# Patient Record
Sex: MALE | Race: WHITE | NOT HISPANIC OR LATINO | Employment: OTHER | URBAN - METROPOLITAN AREA
[De-identification: names, ages, dates, MRNs, and addresses within clinical notes are randomized per-mention and may not be internally consistent; named-entity substitution may affect disease eponyms.]

---

## 2024-09-27 ENCOUNTER — OFFICE VISIT (OUTPATIENT)
Dept: FAMILY MEDICINE CLINIC | Facility: CLINIC | Age: 74
End: 2024-09-27
Payer: MEDICARE

## 2024-09-27 VITALS
TEMPERATURE: 96.6 F | SYSTOLIC BLOOD PRESSURE: 138 MMHG | BODY MASS INDEX: 33.46 KG/M2 | DIASTOLIC BLOOD PRESSURE: 66 MMHG | HEIGHT: 71 IN | HEART RATE: 64 BPM | OXYGEN SATURATION: 98 % | WEIGHT: 239 LBS

## 2024-09-27 DIAGNOSIS — E78.2 MIXED HYPERLIPIDEMIA: ICD-10-CM

## 2024-09-27 DIAGNOSIS — E11.9 DM TYPE 2 WITHOUT RETINOPATHY (HCC): ICD-10-CM

## 2024-09-27 DIAGNOSIS — R93.1 ELEVATED CORONARY ARTERY CALCIUM SCORE: ICD-10-CM

## 2024-09-27 DIAGNOSIS — D72.819 LEUKOPENIA, UNSPECIFIED TYPE: ICD-10-CM

## 2024-09-27 DIAGNOSIS — I10 PRIMARY HYPERTENSION: Primary | ICD-10-CM

## 2024-09-27 DIAGNOSIS — E88.89 STEATOSIS (HCC): ICD-10-CM

## 2024-09-27 DIAGNOSIS — D69.6 THROMBOCYTOPENIA (HCC): ICD-10-CM

## 2024-09-27 DIAGNOSIS — N40.0 BPH WITHOUT OBSTRUCTION/LOWER URINARY TRACT SYMPTOMS: ICD-10-CM

## 2024-09-27 PROBLEM — Z23 ENCOUNTER FOR IMMUNIZATION: Status: ACTIVE | Noted: 2024-09-27

## 2024-09-27 PROCEDURE — 99204 OFFICE O/P NEW MOD 45 MIN: CPT | Performed by: INTERNAL MEDICINE

## 2024-09-27 RX ORDER — IRBESARTAN 300 MG/1
300 TABLET ORAL DAILY
COMMUNITY
Start: 2020-01-01

## 2024-09-27 RX ORDER — CYANOCOBALAMIN (VITAMIN B-12) 1000 MCG
1 TABLET ORAL DAILY
COMMUNITY

## 2024-09-27 RX ORDER — TAMSULOSIN HYDROCHLORIDE 0.4 MG/1
0.4 CAPSULE ORAL
COMMUNITY
Start: 2020-01-01

## 2024-09-27 RX ORDER — UBIDECARENONE 100 MG
1000 CAPSULE ORAL DAILY
COMMUNITY

## 2024-09-27 RX ORDER — CARVEDILOL 3.12 MG/1
3.12 TABLET ORAL 2 TIMES DAILY WITH MEALS
COMMUNITY
Start: 2020-01-01

## 2024-09-27 RX ORDER — FAMOTIDINE 40 MG/1
40 TABLET, FILM COATED ORAL 2 TIMES DAILY
COMMUNITY

## 2024-09-27 NOTE — ASSESSMENT & PLAN NOTE
Recent leukocyte count was within normal limits.  Low normal of course.  Consider mild dysplastic syndrome.

## 2024-09-27 NOTE — ASSESSMENT & PLAN NOTE
Continue with current medical therapy.  Blood pressure seems well-controlled at this time.  Tolerating all meds.  Orders:    Comprehensive metabolic panel; Future    CBC and differential; Future

## 2024-09-27 NOTE — ASSESSMENT & PLAN NOTE
Remains on moderate intensity statin.  Will review labs.  Notably calcium score of greater than 800.  Has had stress test in the past all were negative.  Orders:    Lipid Panel with Direct LDL reflex; Future

## 2024-09-27 NOTE — ASSESSMENT & PLAN NOTE
Apparently a present on ultrasound but not on MRI.  MRI also failed reveal any evidence of iron deposition.  Common bile duct.  Within normal limits as well.  Consider FibroScan?

## 2024-09-27 NOTE — PROGRESS NOTES
"Ambulatory Visit  Name: Rikki Reyes      : 1950      MRN: [id number]  Encounter Provider: Logan Piedra DO  Encounter Date: 2024   Encounter department: [facility]IMARY [facility]ARE    Assessment & Plan  Primary hypertension  Continue with current medical therapy.  Blood pressure seems well-controlled at this time.  Tolerating all meds.  Orders:    Comprehensive metabolic panel; Future    CBC and differential; Future    Mixed hyperlipidemia  Remains on moderate intensity statin.  Will review labs.  Notably calcium score of greater than 800.  Has had stress test in the past all were negative.  Orders:    Lipid Panel with Direct LDL reflex; Future    DM type 2 without retinopathy (HCC)  Has underlying diabetes mellitus, however is diet controlled.  Takes no medications at this time.  Encouraged him to follow-up with ophthalmology.  No results found for: \"HGBA1C\"    Orders:    Hemoglobin A1C; Future    BPH without obstruction/lower urinary tract symptoms  Last PSA January of last year, this will need to be reordered at some point, level was less than 2 at that time.         Thrombocytopenia (HCC)  Chronic thrombocytopenia, thought to be related to his alcohol consumption.  Interestingly had an ultrasound of the abdomen revealing steatosis, but year ago an MRCP which revealed no evidence of steatosis.  Consider evaluation for portal hypertension with ultrasound with color-flow Doppler.  May need hematology referral.  Consider myelodysplastic syndrome.       Steatosis (HCC)  Apparently a present on ultrasound but not on MRI.  MRI also failed reveal any evidence of iron deposition.  Common bile duct.  Within normal limits as well.  Consider FibroScan?       Leukopenia, unspecified type  Recent leukocyte count was within normal limits.  Low normal of course.  Consider mild dysplastic syndrome.       Elevated coronary artery calcium score  Continue with lifestyle modification, notably quit " "smoking.  Blood pressure well-controlled and he is on moderate intensity statin at this time.          History of Present Illness     Here establish as a new patient.  He really has no problems or complaints to speak of.  Past medical history significant for hypertension, hyperlipidemia, elevated calcium score, steatosis and thrombocytopenia for which she was being followed for right primary care.  He has seen cardiology, GI specialist, and believes he saw a hematologist once.    Was unable to get the care everywhere at the time of the appointment but will review once it is available to me for further evaluation.  He brings 1 note from his prior doctor with him.  Reviewed this in detail.    He currently is having no chest pain, no shortness of breath no bowel or bladder problems.  Vaccines are up-to-date,          Review of Systems   Constitutional:  Negative for chills and fever.   Eyes:  Negative for pain and visual disturbance.   Respiratory:  Negative for cough and shortness of breath.    Cardiovascular:  Negative for chest pain and palpitations.   Gastrointestinal:  Negative for abdominal pain and vomiting.   Genitourinary:  Negative for dysuria and hematuria.   Musculoskeletal:  Positive for arthralgias and back pain.   Skin:  Negative for color change and rash.   Neurological:  Negative for seizures and syncope.   All other systems reviewed and are negative.          Objective     /66 (BP Location: Left arm, Patient Position: Sitting, Cuff Size: Large)   Pulse 64   Temp (!) 96.6 °F (35.9 °C) (Tympanic)   Ht 5' 11\" (1.803 m)   Wt 108 kg (239 lb)   SpO2 98%   BMI 33.33 kg/m²     Physical Exam  Vitals and nursing note reviewed.   Constitutional:       General: He is not in acute distress.     Appearance: Normal appearance. He is well-developed.   HENT:      Head: Normocephalic and atraumatic.   Eyes:      Conjunctiva/sclera: Conjunctivae normal.   Cardiovascular:      Rate and Rhythm: Normal rate and " regular rhythm.      Pulses: Normal pulses.      Heart sounds: Murmur heard.      Comments: Worsened over decrease and no murmur.  Pulmonary:      Effort: Pulmonary effort is normal. No respiratory distress.      Breath sounds: Normal breath sounds.   Abdominal:      Palpations: Abdomen is soft.      Tenderness: There is no abdominal tenderness.   Musculoskeletal:         General: No swelling.      Cervical back: Neck supple.   Skin:     General: Skin is warm and dry.      Capillary Refill: Capillary refill takes less than 2 seconds.   Neurological:      General: No focal deficit present.      Mental Status: He is alert and oriented to person, place, and time.   Psychiatric:         Mood and Affect: Mood normal.

## 2024-09-27 NOTE — ASSESSMENT & PLAN NOTE
"Has underlying diabetes mellitus, however is diet controlled.  Takes no medications at this time.  Encouraged him to follow-up with ophthalmology.  No results found for: \"HGBA1C\"    Orders:    Hemoglobin A1C; Future    "

## 2024-09-27 NOTE — ASSESSMENT & PLAN NOTE
Continue with lifestyle modification, notably quit smoking.  Blood pressure well-controlled and he is on moderate intensity statin at this time.

## 2024-09-27 NOTE — ASSESSMENT & PLAN NOTE
Last PSA January of last year, this will need to be reordered at some point, level was less than 2 at that time.

## 2024-09-27 NOTE — ASSESSMENT & PLAN NOTE
Chronic thrombocytopenia, thought to be related to his alcohol consumption.  Interestingly had an ultrasound of the abdomen revealing steatosis, but year ago an MRCP which revealed no evidence of steatosis.  Consider evaluation for portal hypertension with ultrasound with color-flow Doppler.  May need hematology referral.  Consider myelodysplastic syndrome.

## 2024-10-01 ENCOUNTER — APPOINTMENT (OUTPATIENT)
Age: 74
End: 2024-10-01
Payer: MEDICARE

## 2024-10-01 DIAGNOSIS — E11.9 DM TYPE 2 WITHOUT RETINOPATHY (HCC): ICD-10-CM

## 2024-10-01 DIAGNOSIS — E78.2 MIXED HYPERLIPIDEMIA: ICD-10-CM

## 2024-10-01 DIAGNOSIS — I10 PRIMARY HYPERTENSION: ICD-10-CM

## 2024-10-01 LAB
ALBUMIN SERPL BCG-MCNC: 4.2 G/DL (ref 3.5–5)
ALP SERPL-CCNC: 61 U/L (ref 34–104)
ALT SERPL W P-5'-P-CCNC: 21 U/L (ref 7–52)
ANION GAP SERPL CALCULATED.3IONS-SCNC: 9 MMOL/L (ref 4–13)
AST SERPL W P-5'-P-CCNC: 19 U/L (ref 13–39)
BASOPHILS # BLD AUTO: 0.02 THOUSANDS/ÂΜL (ref 0–0.1)
BASOPHILS NFR BLD AUTO: 1 % (ref 0–1)
BILIRUB SERPL-MCNC: 3.03 MG/DL (ref 0.2–1)
BUN SERPL-MCNC: 13 MG/DL (ref 5–25)
CALCIUM SERPL-MCNC: 9 MG/DL (ref 8.4–10.2)
CHLORIDE SERPL-SCNC: 105 MMOL/L (ref 96–108)
CHOLEST SERPL-MCNC: 122 MG/DL
CO2 SERPL-SCNC: 29 MMOL/L (ref 21–32)
CREAT SERPL-MCNC: 0.83 MG/DL (ref 0.6–1.3)
EOSINOPHIL # BLD AUTO: 0.18 THOUSAND/ÂΜL (ref 0–0.61)
EOSINOPHIL NFR BLD AUTO: 5 % (ref 0–6)
ERYTHROCYTE [DISTWIDTH] IN BLOOD BY AUTOMATED COUNT: 13.2 % (ref 11.6–15.1)
EST. AVERAGE GLUCOSE BLD GHB EST-MCNC: 100 MG/DL
GFR SERPL CREATININE-BSD FRML MDRD: 87 ML/MIN/1.73SQ M
GLUCOSE P FAST SERPL-MCNC: 131 MG/DL (ref 65–99)
HBA1C MFR BLD: 5.1 %
HCT VFR BLD AUTO: 42.4 % (ref 36.5–49.3)
HDLC SERPL-MCNC: 65 MG/DL
HGB BLD-MCNC: 14.4 G/DL (ref 12–17)
IMM GRANULOCYTES # BLD AUTO: 0.02 THOUSAND/UL (ref 0–0.2)
IMM GRANULOCYTES NFR BLD AUTO: 1 % (ref 0–2)
LDLC SERPL CALC-MCNC: 44 MG/DL (ref 0–100)
LYMPHOCYTES # BLD AUTO: 1.28 THOUSANDS/ÂΜL (ref 0.6–4.47)
LYMPHOCYTES NFR BLD AUTO: 32 % (ref 14–44)
MCH RBC QN AUTO: 38.8 PG (ref 26.8–34.3)
MCHC RBC AUTO-ENTMCNC: 34 G/DL (ref 31.4–37.4)
MCV RBC AUTO: 114 FL (ref 82–98)
MONOCYTES # BLD AUTO: 0.2 THOUSAND/ÂΜL (ref 0.17–1.22)
MONOCYTES NFR BLD AUTO: 5 % (ref 4–12)
NEUTROPHILS # BLD AUTO: 2.26 THOUSANDS/ÂΜL (ref 1.85–7.62)
NEUTS SEG NFR BLD AUTO: 56 % (ref 43–75)
NRBC BLD AUTO-RTO: 0 /100 WBCS
PLATELET BLD QL SMEAR: ABNORMAL
PMV BLD AUTO: 11.2 FL (ref 8.9–12.7)
POTASSIUM SERPL-SCNC: 3.9 MMOL/L (ref 3.5–5.3)
PROT SERPL-MCNC: 6.5 G/DL (ref 6.4–8.4)
RBC # BLD AUTO: 3.71 MILLION/UL (ref 3.88–5.62)
RBC MORPH BLD: NORMAL
SODIUM SERPL-SCNC: 143 MMOL/L (ref 135–147)
TRIGL SERPL-MCNC: 65 MG/DL
WBC # BLD AUTO: 3.96 THOUSAND/UL (ref 4.31–10.16)

## 2024-10-01 PROCEDURE — 85025 COMPLETE CBC W/AUTO DIFF WBC: CPT

## 2024-10-01 PROCEDURE — 80061 LIPID PANEL: CPT

## 2024-10-01 PROCEDURE — 83036 HEMOGLOBIN GLYCOSYLATED A1C: CPT

## 2024-10-01 PROCEDURE — 36415 COLL VENOUS BLD VENIPUNCTURE: CPT

## 2024-10-01 PROCEDURE — 80053 COMPREHEN METABOLIC PANEL: CPT

## 2024-10-03 DIAGNOSIS — D69.6 THROMBOCYTOPENIA (HCC): Primary | ICD-10-CM

## 2024-10-03 DIAGNOSIS — D69.1 PLATELET DISORDER (HCC): ICD-10-CM

## 2024-10-03 DIAGNOSIS — K74.69 OTHER CIRRHOSIS OF LIVER (HCC): ICD-10-CM

## 2024-10-04 ENCOUNTER — TELEPHONE (OUTPATIENT)
Dept: FAMILY MEDICINE CLINIC | Facility: CLINIC | Age: 74
End: 2024-10-04

## 2024-10-04 NOTE — TELEPHONE ENCOUNTER
----- Message from Logan Piedra, DO sent at 10/3/2024  5:26 PM EDT -----  Notify patient, labs appear to be quite stable.  The only problem is a could not calculate a platelet count due to platelet clumping.  He will need to have this drawn again at some point.  I have placed an order in epic for repeat blood count with special tube to avoid platelet clumping.

## 2024-10-07 ENCOUNTER — APPOINTMENT (OUTPATIENT)
Age: 74
End: 2024-10-07
Payer: MEDICARE

## 2024-10-07 DIAGNOSIS — K74.69 OTHER CIRRHOSIS OF LIVER (HCC): ICD-10-CM

## 2024-10-07 DIAGNOSIS — D69.6 THROMBOCYTOPENIA (HCC): ICD-10-CM

## 2024-10-07 DIAGNOSIS — D69.1 PLATELET DISORDER (HCC): ICD-10-CM

## 2024-10-07 DIAGNOSIS — D69.1 PLATELET DISORDER (HCC): Primary | ICD-10-CM

## 2024-10-07 LAB
BASOPHILS # BLD AUTO: 0.01 THOUSANDS/ΜL (ref 0–0.1)
BASOPHILS NFR BLD AUTO: 0 % (ref 0–1)
EOSINOPHIL # BLD AUTO: 0.13 THOUSAND/ΜL (ref 0–0.61)
EOSINOPHIL NFR BLD AUTO: 3 % (ref 0–6)
ERYTHROCYTE [DISTWIDTH] IN BLOOD BY AUTOMATED COUNT: 13.5 % (ref 11.6–15.1)
HCT VFR BLD AUTO: 43.9 % (ref 36.5–49.3)
HGB BLD-MCNC: 14.3 G/DL (ref 12–17)
IMM GRANULOCYTES # BLD AUTO: 0.01 THOUSAND/UL (ref 0–0.2)
IMM GRANULOCYTES NFR BLD AUTO: 0 % (ref 0–2)
LYMPHOCYTES # BLD AUTO: 0.97 THOUSANDS/ΜL (ref 0.6–4.47)
LYMPHOCYTES NFR BLD AUTO: 20 % (ref 14–44)
MCH RBC QN AUTO: 37.6 PG (ref 26.8–34.3)
MCHC RBC AUTO-ENTMCNC: 32.6 G/DL (ref 31.4–37.4)
MCV RBC AUTO: 116 FL (ref 82–98)
MONOCYTES # BLD AUTO: 0.17 THOUSAND/ΜL (ref 0.17–1.22)
MONOCYTES NFR BLD AUTO: 4 % (ref 4–12)
NEUTROPHILS # BLD AUTO: 3.47 THOUSANDS/ΜL (ref 1.85–7.62)
NEUTS SEG NFR BLD AUTO: 73 % (ref 43–75)
NRBC BLD AUTO-RTO: 0 /100 WBCS
PLATELET # BLD AUTO: 67 THOUSANDS/UL (ref 149–390)
PLATELET BLD QL SMEAR: ABNORMAL
PMV BLD AUTO: 11.6 FL (ref 8.9–12.7)
RBC # BLD AUTO: 3.8 MILLION/UL (ref 3.88–5.62)
RBC MORPH BLD: NORMAL
WBC # BLD AUTO: 4.76 THOUSAND/UL (ref 4.31–10.16)

## 2024-10-07 PROCEDURE — 36415 COLL VENOUS BLD VENIPUNCTURE: CPT

## 2024-10-07 PROCEDURE — 85025 COMPLETE CBC W/AUTO DIFF WBC: CPT

## 2024-10-08 ENCOUNTER — TELEPHONE (OUTPATIENT)
Dept: FAMILY MEDICINE CLINIC | Facility: CLINIC | Age: 74
End: 2024-10-08

## 2024-10-08 NOTE — TELEPHONE ENCOUNTER
----- Message from Logan Piedra DO sent at 10/8/2024 12:36 PM EDT -----  Notify patient that platelet counts remain low at 67.  Should probably follow-up with a hematologist, and likely needs a Doppler study of his liver and spleen to see if there are increased pressures causing destruction, as well as a evaluation for autoimmune and low platelets.  He is willing to go see a hematologist see if you prefer [facility] or [facility], but would like to talk about this at the next appointment we can do that to given its chronicity.

## 2024-10-08 NOTE — TELEPHONE ENCOUNTER
Patient returned call. The following message was relayed:    ---- Message from Logan Piedra DO sent at 10/8/2024 12:36 PM EDT -----  Notify patient that platelet counts remain low at 67.  Should probably follow-up with a hematologist, and likely needs a Doppler study of his liver and spleen to see if there are increased pressures causing destruction, as well as a evaluation for autoimmune and low platelets.  He is willing to go see a hematologist see if you prefer [facility] or [facility], but would like to talk about this at the next appointment we can do that to given its chronicity.    Patient stated he would like to try [facility] as long as there is an office close to home. Patient had no further questions     Please advise  Thank you

## 2024-10-09 NOTE — TELEPHONE ENCOUNTER
Will discuss with patient in January at his appointment.  Order blood work that day nonfasting and likely refer to hematology at that appointment.

## 2024-10-23 LAB
LEFT EYE DIABETIC RETINOPATHY: NORMAL
RIGHT EYE DIABETIC RETINOPATHY: NORMAL

## 2024-12-29 ENCOUNTER — RA CDI HCC (OUTPATIENT)
Dept: OTHER | Facility: HOSPITAL | Age: 74
End: 2024-12-29

## 2025-01-02 ENCOUNTER — OFFICE VISIT (OUTPATIENT)
Dept: FAMILY MEDICINE CLINIC | Facility: CLINIC | Age: 75
End: 2025-01-02
Payer: MEDICARE

## 2025-01-02 VITALS
TEMPERATURE: 98.7 F | SYSTOLIC BLOOD PRESSURE: 128 MMHG | WEIGHT: 240 LBS | HEART RATE: 69 BPM | OXYGEN SATURATION: 98 % | DIASTOLIC BLOOD PRESSURE: 78 MMHG | HEIGHT: 71 IN | BODY MASS INDEX: 33.6 KG/M2

## 2025-01-02 DIAGNOSIS — Z12.5 SCREENING FOR PROSTATE CANCER: ICD-10-CM

## 2025-01-02 DIAGNOSIS — N40.1 BPH ASSOCIATED WITH NOCTURIA: ICD-10-CM

## 2025-01-02 DIAGNOSIS — E78.2 MIXED HYPERLIPIDEMIA: ICD-10-CM

## 2025-01-02 DIAGNOSIS — I10 PRIMARY HYPERTENSION: ICD-10-CM

## 2025-01-02 DIAGNOSIS — E88.89 STEATOSIS (HCC): ICD-10-CM

## 2025-01-02 DIAGNOSIS — R93.1 ELEVATED CORONARY ARTERY CALCIUM SCORE: ICD-10-CM

## 2025-01-02 DIAGNOSIS — E11.9 DM TYPE 2 WITHOUT RETINOPATHY (HCC): Primary | ICD-10-CM

## 2025-01-02 DIAGNOSIS — D69.6 THROMBOCYTOPENIA (HCC): ICD-10-CM

## 2025-01-02 DIAGNOSIS — R35.1 BPH ASSOCIATED WITH NOCTURIA: ICD-10-CM

## 2025-01-02 DIAGNOSIS — E80.4 GILBERT SYNDROME: ICD-10-CM

## 2025-01-02 PROCEDURE — G2211 COMPLEX E/M VISIT ADD ON: HCPCS | Performed by: INTERNAL MEDICINE

## 2025-01-02 PROCEDURE — 99214 OFFICE O/P EST MOD 30 MIN: CPT | Performed by: INTERNAL MEDICINE

## 2025-01-02 NOTE — ASSESSMENT & PLAN NOTE
Continue current medical therapy.  Blood pressure seems well-controlled.  Orders:  •  Comprehensive metabolic panel; Future  •  CBC and differential; Future

## 2025-01-02 NOTE — ASSESSMENT & PLAN NOTE
Remains on moderate intensity statin and aspirin 81 mg daily  Orders:  •  Lipid Panel with Direct LDL reflex; Future

## 2025-01-02 NOTE — ASSESSMENT & PLAN NOTE
Likely related to EtOH use and steatosis.  Weather is nicer consider another ultrasound with Doppler this time.  Might also consider the antiplatelet antibodies, as well as autoimmune evaluation for the low platelets.  Orders:  •  CBC and differential; Future

## 2025-01-02 NOTE — ASSESSMENT & PLAN NOTE
Multifactorial, but clearly has not discontinued his alcohol.  Discussed this in some detail, offered [employer] to help him cut back, but he declines this.

## 2025-01-02 NOTE — PROGRESS NOTES
Name: Rikki Reyes      : 1950      MRN: [id number]  Encounter Provider: Logan Piedra DO  Encounter Date: 2025   Encounter department: [facility] PRIMARY [facility]  :  Assessment & Plan  DM type 2 without retinopathy (HCC)  Consider diet-controlled diabetes mellitus.  Now on metformin alone.  Continue periodic monitoring A1c and diabetic labs.  Lab Results   Component Value Date    HGBA1C 5.1 10/01/2024     Orders:  •  Hemoglobin A1C; Future  •  Comprehensive metabolic panel; Future  •  CBC and differential; Future  •  Albumin / creatinine urine ratio; Future    Primary hypertension  Continue current medical therapy.  Blood pressure seems well-controlled.  Orders:  •  Comprehensive metabolic panel; Future  •  CBC and differential; Future    Thrombocytopenia (HCC)  Likely related to EtOH use and steatosis.  Weather is nicer consider another ultrasound with Doppler this time.  Might also consider the antiplatelet antibodies, as well as autoimmune evaluation for the low platelets.  Orders:  •  CBC and differential; Future    Steatosis (HCC)  Multifactorial, but clearly has not discontinued his alcohol.  Discussed this in some detail, offered [employer] to help him cut back, but he declines this.       Elevated coronary artery calcium score  Remains on moderate intensity statin, and good blood pressure control.       Mixed hyperlipidemia  Remains on moderate intensity statin and aspirin 81 mg daily  Orders:  •  Lipid Panel with Direct LDL reflex; Future    Screening for prostate cancer    Orders:  •  PSA, Total Screen; Future    Gilbert syndrome  Chronic elevations in the bilirubin.  Familial history of the same.       BPH associated with nocturia    Orders:  •  PSA, Total Screen; Future           History of Present Illness     Offers no complaints.  Feels well.  Denies any chest pain or shortness of breath.  No nausea vomiting or diarrhea.  No recent issues with current medications.  Has been  "off metformin for a year with no stable A1c's.  Continues with low platelets which she has had for a while.  Has seen hematology down in [place] when he lived there.  Denies bruising.  Gets occasional night sweats.  No recent weight loss.  Appetite is good.  Quit smoking sometime ago.      Review of Systems   Constitutional:  Negative for chills and fever.   HENT:  Negative for sore throat.    Eyes:  Negative for pain and visual disturbance.   Respiratory:  Negative for cough and shortness of breath.    Cardiovascular:  Negative for chest pain and palpitations.   Gastrointestinal:  Negative for abdominal pain and vomiting.   Genitourinary:  Negative for dysuria and hematuria.   Musculoskeletal:  Positive for arthralgias and back pain.   Skin:  Negative for color change and rash.   Neurological:  Negative for seizures and syncope.   All other systems reviewed and are negative.      Objective   /78 (BP Location: Left arm, Patient Position: Sitting, Cuff Size: Large)   Pulse 69   Temp 98.7 °F (37.1 °C) (Tympanic)   Ht 5' 11\" (1.803 m)   Wt 109 kg (240 lb)   SpO2 98%   BMI 33.47 kg/m²      Physical Exam  Vitals and nursing note reviewed.   Constitutional:       General: He is not in acute distress.     Appearance: Normal appearance. He is well-developed.   HENT:      Head: Normocephalic and atraumatic.   Eyes:      Conjunctiva/sclera: Conjunctivae normal.   Cardiovascular:      Rate and Rhythm: Normal rate and regular rhythm.      Pulses: Normal pulses.      Heart sounds: Normal heart sounds. No murmur heard.  Pulmonary:      Effort: Pulmonary effort is normal. No respiratory distress.      Breath sounds: Normal breath sounds.   Abdominal:      Palpations: Abdomen is soft.      Tenderness: There is no abdominal tenderness.   Musculoskeletal:         General: No swelling.      Cervical back: Neck supple.   Skin:     General: Skin is warm and dry.      Capillary Refill: Capillary refill takes less than 2 " seconds.      Coloration: Skin is pale.   Neurological:      General: No focal deficit present.      Mental Status: He is alert and oriented to person, place, and time.   Psychiatric:         Mood and Affect: Mood normal.

## 2025-01-02 NOTE — ASSESSMENT & PLAN NOTE
Consider diet-controlled diabetes mellitus.  Now on metformin alone.  Continue periodic monitoring A1c and diabetic labs.  Lab Results   Component Value Date    HGBA1C 5.1 10/01/2024     Orders:  •  Hemoglobin A1C; Future  •  Comprehensive metabolic panel; Future  •  CBC and differential; Future  •  Albumin / creatinine urine ratio; Future

## 2025-01-19 DIAGNOSIS — E78.2 MIXED HYPERLIPIDEMIA: Primary | ICD-10-CM

## 2025-01-19 DIAGNOSIS — I10 PRIMARY HYPERTENSION: ICD-10-CM

## 2025-01-20 RX ORDER — CARVEDILOL 3.12 MG/1
3.12 TABLET ORAL 2 TIMES DAILY WITH MEALS
Qty: 180 TABLET | Refills: 3 | Status: SHIPPED | OUTPATIENT
Start: 2025-01-20

## 2025-02-04 ENCOUNTER — RESULTS FOLLOW-UP (OUTPATIENT)
Dept: FAMILY MEDICINE CLINIC | Facility: CLINIC | Age: 75
End: 2025-02-04

## 2025-02-04 ENCOUNTER — APPOINTMENT (OUTPATIENT)
Dept: RADIOLOGY | Facility: CLINIC | Age: 75
End: 2025-02-04
Payer: MEDICARE

## 2025-02-04 DIAGNOSIS — L50.9 HIVES: ICD-10-CM

## 2025-02-04 DIAGNOSIS — R05.2 SUBACUTE COUGH: ICD-10-CM

## 2025-02-04 PROCEDURE — 71046 X-RAY EXAM CHEST 2 VIEWS: CPT

## 2025-02-04 RX ORDER — PREDNISONE 10 MG/1
TABLET ORAL
Qty: 20 TABLET | Refills: 0 | Status: SHIPPED | OUTPATIENT
Start: 2025-02-04

## 2025-02-04 NOTE — TELEPHONE ENCOUNTER
Patient called back for results.  I read doctor's note, verbatim.  Patient understood and had no further questions.

## 2025-02-17 ENCOUNTER — OFFICE VISIT (OUTPATIENT)
Dept: FAMILY MEDICINE CLINIC | Facility: CLINIC | Age: 75
End: 2025-02-17
Payer: MEDICARE

## 2025-02-17 VITALS
WEIGHT: 230 LBS | BODY MASS INDEX: 32.2 KG/M2 | SYSTOLIC BLOOD PRESSURE: 118 MMHG | DIASTOLIC BLOOD PRESSURE: 70 MMHG | OXYGEN SATURATION: 98 % | TEMPERATURE: 97.5 F | HEIGHT: 71 IN | HEART RATE: 68 BPM

## 2025-02-17 DIAGNOSIS — R35.1 BPH ASSOCIATED WITH NOCTURIA: ICD-10-CM

## 2025-02-17 DIAGNOSIS — K31.89 PORTAL HYPERTENSIVE GASTROPATHY  (HCC): ICD-10-CM

## 2025-02-17 DIAGNOSIS — J45.909 ACUTE ASTHMATIC BRONCHITIS: Primary | ICD-10-CM

## 2025-02-17 DIAGNOSIS — N40.1 BPH ASSOCIATED WITH NOCTURIA: ICD-10-CM

## 2025-02-17 DIAGNOSIS — E80.4 GILBERT SYNDROME: ICD-10-CM

## 2025-02-17 DIAGNOSIS — E88.89 STEATOSIS (HCC): ICD-10-CM

## 2025-02-17 DIAGNOSIS — K76.6 PORTAL HYPERTENSIVE GASTROPATHY  (HCC): ICD-10-CM

## 2025-02-17 DIAGNOSIS — D69.6 THROMBOCYTOPENIA (HCC): ICD-10-CM

## 2025-02-17 DIAGNOSIS — J45.909 ACUTE ASTHMATIC BRONCHITIS: ICD-10-CM

## 2025-02-17 DIAGNOSIS — I10 PRIMARY HYPERTENSION: ICD-10-CM

## 2025-02-17 PROCEDURE — G2211 COMPLEX E/M VISIT ADD ON: HCPCS | Performed by: INTERNAL MEDICINE

## 2025-02-17 PROCEDURE — 99213 OFFICE O/P EST LOW 20 MIN: CPT | Performed by: INTERNAL MEDICINE

## 2025-02-17 RX ORDER — FLUTICASONE PROPIONATE AND SALMETEROL 100; 50 UG/1; UG/1
1 POWDER RESPIRATORY (INHALATION) 2 TIMES DAILY
Qty: 60 BLISTER | Refills: 5 | Status: SHIPPED | OUTPATIENT
Start: 2025-02-17 | End: 2025-08-16

## 2025-02-17 RX ORDER — TAMSULOSIN HYDROCHLORIDE 0.4 MG/1
0.8 CAPSULE ORAL
Qty: 180 CAPSULE | Refills: 3 | Status: SHIPPED | OUTPATIENT
Start: 2025-02-17

## 2025-02-17 NOTE — ASSESSMENT & PLAN NOTE
This is subacute cough.  Notably had a long history of smoking but quit 20 years ago.  Suspect he has a postviral reactive airway, will treat with Wixela and elation therapy.  He will call if he is not improving.  Instructions on how to use this device given.  Orders:  •  Fluticasone-Salmeterol (Advair) 100-50 mcg/dose inhaler; Inhale 1 puff 2 (two) times a day Rinse mouth after use.

## 2025-02-17 NOTE — ASSESSMENT & PLAN NOTE
Continue to hold valsartan as his blood pressure is only 120/72 today.  Continue to monitor at home.

## 2025-02-17 NOTE — ASSESSMENT & PLAN NOTE
Continue to monitor.  He is agreeable to talk about this again in July.  We talked about hematology for further diagnostic determination.  Etiology could be chronic ITP versus his EtOH use which she is now stopped.  Consider blood work on his next appointment.

## 2025-02-17 NOTE — PROGRESS NOTES
Name: Rikki Reyes      : 1950      MRN: [id number]  Encounter Provider: Logan Piedra DO  Encounter Date: 2025   Encounter department: [facility]  :  Assessment & Plan  Acute asthmatic bronchitis  This is subacute cough.  Notably had a long history of smoking but quit 20 years ago.  Suspect he has a postviral reactive airway, will treat with Wixela and elation therapy.  He will call if he is not improving.  Instructions on how to use this device given.  Orders:  •  Fluticasone-Salmeterol (Advair) 100-50 mcg/dose inhaler; Inhale 1 puff 2 (two) times a day Rinse mouth after use.    Primary hypertension  Continue to hold valsartan as his blood pressure is only 120/72 today.  Continue to monitor at home.       Gilbert syndrome  Chronic elevations in bilirubin.       Steatosis (HCC)  Ultrasound suggestive of steatosis, MRI of the liver does not pan that out.       Thrombocytopenia (HCC)  Continue to monitor.  He is agreeable to talk about this again in July.  We talked about hematology for further diagnostic determination.  Etiology could be chronic ITP versus his EtOH use which she is now stopped.  Consider blood work on his next appointment.       Portal hypertensive gastropathy  (HCC)  Consider to have a portal hypertensive gastropathy, although MRI and ultrasounds do not reflect this.  May need to consider ultrasound with Doppler.       BPH associated with nocturia  Notably having increasing urinary difficulties.  Have recommended he increase his Flomax to 2 pills daily and send in a prescription for same.  Low threshold to send to urology at this point.  He will see how things go until her next appointment.  Orders:  •  tamsulosin (FLOMAX) 0.4 mg; Take 2 capsules (0.8 mg total) by mouth daily with dinner           History of Present Illness   Area is here with a dry cough.  He started with an illness about 3 weeks ago.  Started with a cough congestion, this slowly  "resolved with course of steroids and some antibiotics.  He then subsequently had a bout of diarrhea which lasted for 3 days and his bowels still are normal.  Following these illnesses he noticed when he tried to go back to drinking bourbon, he started having a reaction where he would start itching and seem to get a low-grade rash.  For this reason he quit drinking bourbon altogether.  He does have a history of steatosis, as well as chronic thrombocytopenia of undetermined etiology (ITP versus related to EtOH).    His current cough is just annoying.  He is not really bringing anything up.  It is dry cough.  Should be noted he stopped his valsartan, when he stopped drinking his blood pressure was going low.  He feels well now.    He also notes urine stream is becoming more problematic, tamsulosin is not as effective anymore due to delay of stream.      Review of Systems   Constitutional:  Negative for chills and fever.   HENT:  Negative for congestion and sore throat.    Eyes:  Negative for pain and visual disturbance.   Respiratory:  Positive for cough. Negative for shortness of breath.    Cardiovascular:  Negative for chest pain and palpitations.   Gastrointestinal:  Positive for diarrhea. Negative for abdominal pain and vomiting.   Genitourinary:  Negative for dysuria and hematuria.   Musculoskeletal:  Positive for arthralgias. Negative for back pain.   Skin:  Positive for color change. Negative for rash.   Neurological:  Negative for seizures and syncope.   All other systems reviewed and are negative.      Objective   /70 (BP Location: Left arm, Patient Position: Sitting, Cuff Size: Standard)   Pulse 68   Temp 97.5 °F (36.4 °C) (Tympanic)   Ht 5' 11\" (1.803 m)   Wt 104 kg (230 lb)   SpO2 98%   BMI 32.08 kg/m²      Physical Exam  Vitals and nursing note reviewed.   Constitutional:       General: He is not in acute distress.     Appearance: Normal appearance. He is well-developed.   HENT:      Head: " Normocephalic and atraumatic.   Eyes:      Conjunctiva/sclera: Conjunctivae normal.   Cardiovascular:      Rate and Rhythm: Normal rate and regular rhythm.      Pulses: Normal pulses.      Heart sounds: Normal heart sounds. No murmur heard.  Pulmonary:      Effort: Pulmonary effort is normal. No respiratory distress.      Breath sounds: Normal breath sounds.   Abdominal:      Palpations: Abdomen is soft.      Tenderness: There is no abdominal tenderness.   Musculoskeletal:         General: No swelling.      Cervical back: Neck supple.   Skin:     General: Skin is warm and dry.      Capillary Refill: Capillary refill takes less than 2 seconds.   Neurological:      General: No focal deficit present.      Mental Status: He is alert and oriented to person, place, and time.   Psychiatric:         Mood and Affect: Mood normal.

## 2025-02-17 NOTE — ASSESSMENT & PLAN NOTE
Consider to have a portal hypertensive gastropathy, although MRI and ultrasounds do not reflect this.  May need to consider ultrasound with Doppler.

## 2025-02-18 ENCOUNTER — TELEPHONE (OUTPATIENT)
Dept: FAMILY MEDICINE CLINIC | Facility: CLINIC | Age: 75
End: 2025-02-18

## 2025-02-18 ENCOUNTER — TELEPHONE (OUTPATIENT)
Age: 75
End: 2025-02-18

## 2025-02-18 RX ORDER — FLUTICASONE PROPIONATE AND SALMETEROL XINAFOATE 45; 21 UG/1; UG/1
AEROSOL, METERED RESPIRATORY (INHALATION)
Refills: 0 | OUTPATIENT
Start: 2025-02-18

## 2025-02-18 NOTE — TELEPHONE ENCOUNTER
Patient called stating he has received a message form the pharmacy. The message stated that the pharmacy has reached out to the PCP for an alternative medication.     Patient requesting a return call with recommendations.    Please advise  Thank you

## 2025-02-18 NOTE — TELEPHONE ENCOUNTER
Reason for call:   [x] Prior Auth  [] Other:     Caller:  [] Patient  [x] Pharmacy  Name: [id number]   Address:[address] [place] [address]   Callback Number: [phone]    Medication:  Fluticasone-Salmeterol (Advair) 100-50 mcg/dose inhaler     Dose/Frequency:  Inhale 1 puff 2 (two) times a day Rinse mouth after use     Quantity: 60    Ordering Provider:   [x] PCP/Provider -Logan Piedra DO    [] Speciality/Provider -

## 2025-02-19 NOTE — TELEPHONE ENCOUNTER
PA for Fluticasone-Salmeterol (Advair) 100-50 mcg/dose inhaler SUBMITTED to     via    [][facility]-KEY: [id number]  []Surescripts-Case ID #   []Availity-Auth ID # NDC #   []Faxed to plan   []Other website   []Phone call Case ID #     [x]PA sent as URGENT    All office notes, labs and other pertaining documents and studies sent. Clinical questions answered. Awaiting determination from insurance company.     Turnaround time for your insurance to make a decision on your Prior Authorization can take 7-21 business days.

## 2025-03-06 DIAGNOSIS — R68.81 EARLY SATIETY: Primary | ICD-10-CM

## 2025-03-21 ENCOUNTER — APPOINTMENT (OUTPATIENT)
Age: 75
End: 2025-03-21
Payer: MEDICARE

## 2025-03-21 ENCOUNTER — TELEPHONE (OUTPATIENT)
Dept: GASTROENTEROLOGY | Facility: CLINIC | Age: 75
End: 2025-03-21

## 2025-03-21 ENCOUNTER — OFFICE VISIT (OUTPATIENT)
Dept: GASTROENTEROLOGY | Facility: CLINIC | Age: 75
End: 2025-03-21
Payer: MEDICARE

## 2025-03-21 VITALS
DIASTOLIC BLOOD PRESSURE: 57 MMHG | SYSTOLIC BLOOD PRESSURE: 115 MMHG | WEIGHT: 215 LBS | OXYGEN SATURATION: 95 % | HEIGHT: 71 IN | HEART RATE: 78 BPM | BODY MASS INDEX: 30.1 KG/M2 | TEMPERATURE: 97.4 F

## 2025-03-21 DIAGNOSIS — R19.4 CHANGE IN BOWEL HABITS: ICD-10-CM

## 2025-03-21 DIAGNOSIS — R53.83 OTHER FATIGUE: ICD-10-CM

## 2025-03-21 DIAGNOSIS — E80.6 HYPERBILIRUBINEMIA: ICD-10-CM

## 2025-03-21 DIAGNOSIS — R63.4 WEIGHT LOSS, ABNORMAL: Primary | ICD-10-CM

## 2025-03-21 DIAGNOSIS — K87 DISORDERS OF GALLBLADDER, BILIARY TRACT AND PANCREAS IN DISEASES CLASSIFIED ELSEWHERE: ICD-10-CM

## 2025-03-21 DIAGNOSIS — R63.4 WEIGHT LOSS, ABNORMAL: ICD-10-CM

## 2025-03-21 DIAGNOSIS — Z86.0100 PERSONAL HISTORY OF COLON POLYPS, UNSPECIFIED: ICD-10-CM

## 2025-03-21 DIAGNOSIS — R05.3 CHRONIC COUGH: ICD-10-CM

## 2025-03-21 DIAGNOSIS — D69.6 THROMBOCYTOPENIA (HCC): ICD-10-CM

## 2025-03-21 DIAGNOSIS — R68.81 EARLY SATIETY: ICD-10-CM

## 2025-03-21 LAB
25(OH)D3 SERPL-MCNC: 33.1 NG/ML (ref 30–100)
ALBUMIN SERPL BCG-MCNC: 3.6 G/DL (ref 3.5–5)
ALP SERPL-CCNC: 59 U/L (ref 34–104)
ALT SERPL W P-5'-P-CCNC: 23 U/L (ref 7–52)
ANION GAP SERPL CALCULATED.3IONS-SCNC: 9 MMOL/L (ref 4–13)
AST SERPL W P-5'-P-CCNC: 16 U/L (ref 13–39)
BILIRUB SERPL-MCNC: 1.65 MG/DL (ref 0.2–1)
BUN SERPL-MCNC: 14 MG/DL (ref 5–25)
CALCIUM SERPL-MCNC: 9 MG/DL (ref 8.4–10.2)
CHLORIDE SERPL-SCNC: 102 MMOL/L (ref 96–108)
CO2 SERPL-SCNC: 27 MMOL/L (ref 21–32)
CREAT SERPL-MCNC: 0.86 MG/DL (ref 0.6–1.3)
ERYTHROCYTE [DISTWIDTH] IN BLOOD BY AUTOMATED COUNT: 17.4 % (ref 11.6–15.1)
FERRITIN SERPL-MCNC: 497 NG/ML (ref 24–336)
FOLATE SERPL-MCNC: 6.8 NG/ML
GFR SERPL CREATININE-BSD FRML MDRD: 86 ML/MIN/1.73SQ M
GLUCOSE P FAST SERPL-MCNC: 134 MG/DL (ref 65–99)
HCT VFR BLD AUTO: 25.1 % (ref 36.5–49.3)
HGB BLD-MCNC: 7.6 G/DL (ref 12–17)
IGA SERPL-MCNC: 198 MG/DL (ref 66–433)
INR PPP: 1.29 (ref 0.85–1.19)
IRON SATN MFR SERPL: 18 % (ref 15–50)
IRON SERPL-MCNC: 36 UG/DL (ref 50–212)
MCH RBC QN AUTO: 36.4 PG (ref 26.8–34.3)
MCHC RBC AUTO-ENTMCNC: 30.3 G/DL (ref 31.4–37.4)
MCV RBC AUTO: 120 FL (ref 82–98)
PLATELET # BLD AUTO: 72 THOUSANDS/UL (ref 149–390)
PMV BLD AUTO: 13 FL (ref 8.9–12.7)
POTASSIUM SERPL-SCNC: 3.4 MMOL/L (ref 3.5–5.3)
PROT SERPL-MCNC: 6.3 G/DL (ref 6.4–8.4)
PROTHROMBIN TIME: 16.3 SECONDS (ref 12.3–15)
RBC # BLD AUTO: 2.09 MILLION/UL (ref 3.88–5.62)
SODIUM SERPL-SCNC: 138 MMOL/L (ref 135–147)
TIBC SERPL-MCNC: 198.8 UG/DL (ref 250–450)
TRANSFERRIN SERPL-MCNC: 142 MG/DL (ref 203–362)
TSH SERPL DL<=0.05 MIU/L-ACNC: 1.47 UIU/ML (ref 0.45–4.5)
UIBC SERPL-MCNC: 163 UG/DL (ref 155–355)
WBC # BLD AUTO: 12.44 THOUSAND/UL (ref 4.31–10.16)

## 2025-03-21 PROCEDURE — 83540 ASSAY OF IRON: CPT

## 2025-03-21 PROCEDURE — 82607 VITAMIN B-12: CPT

## 2025-03-21 PROCEDURE — 99214 OFFICE O/P EST MOD 30 MIN: CPT | Performed by: PHYSICIAN ASSISTANT

## 2025-03-21 PROCEDURE — 36415 COLL VENOUS BLD VENIPUNCTURE: CPT

## 2025-03-21 PROCEDURE — 85027 COMPLETE CBC AUTOMATED: CPT

## 2025-03-21 PROCEDURE — 82746 ASSAY OF FOLIC ACID SERUM: CPT

## 2025-03-21 PROCEDURE — 83550 IRON BINDING TEST: CPT

## 2025-03-21 PROCEDURE — 80053 COMPREHEN METABOLIC PANEL: CPT

## 2025-03-21 PROCEDURE — 82306 VITAMIN D 25 HYDROXY: CPT

## 2025-03-21 PROCEDURE — 82728 ASSAY OF FERRITIN: CPT

## 2025-03-21 PROCEDURE — 84443 ASSAY THYROID STIM HORMONE: CPT

## 2025-03-21 PROCEDURE — 86364 TISS TRNSGLTMNASE EA IG CLAS: CPT

## 2025-03-21 PROCEDURE — 85610 PROTHROMBIN TIME: CPT

## 2025-03-21 PROCEDURE — 82784 ASSAY IGA/IGD/IGG/IGM EACH: CPT

## 2025-03-21 PROCEDURE — 84425 ASSAY OF VITAMIN B-1: CPT

## 2025-03-21 RX ORDER — OMEPRAZOLE 20 MG/1
CAPSULE, DELAYED RELEASE ORAL
COMMUNITY
Start: 2025-02-28 | End: 2025-03-21

## 2025-03-21 RX ORDER — OMEPRAZOLE 40 MG/1
40 CAPSULE, DELAYED RELEASE ORAL DAILY
Qty: 30 CAPSULE | Refills: 3 | Status: SHIPPED | OUTPATIENT
Start: 2025-03-21

## 2025-03-21 NOTE — PROGRESS NOTES
Name: Rikki Reyes      : 1950      MRN: [id number]  Encounter Provider: Allyn Oh PA-C  Encounter Date: 3/21/2025   Encounter department: [facility] [place]  :  Assessment & Plan  Early satiety  Ongoing for 6 weeks  Very poor appetite and getting full quickly  HE has lost 25lbs since the beginning of January  Started after what sounds like a GI virus  Consider gastritis, PUD, gastroparesis...   Will plan EGD  Will check CT AP  Increase Omeprazole to 40mg daily    Change in bowel habits  He suffered what sounds like a severe GI virus in January  Diarrhea has resolved but he continues with 1-2 loose stools daily  No bleeding    Weight loss, abnormal  25lbs documented since early January of this year  EGD/Colonoscopy, Labs, CT AP  Chronic cough  Previously diagnosed with LPR by ENT  Responded favorably to Omeprazole  He is on Omeprazole 20mg daily with some improvement  Will increase to 40mg qAM    Hyperbilirubinemia  Chronic hyperbilirubinemia and thrombocytopenia  Previously following with a GI in [place] who felt he had cirrhosis due to alcoholic fatty liver disease  Imaging has always documented a normal appearing liver  He stopped drinking alcohol completely 6 weeks ago - previously drinking 1/2 liter of bourbon daily  Will update labs  Will check CT  Will check US elastography  He has no documented history of varices, HE, ascites...   Thrombocytopenia (HCC)  See above    Other fatigue  Worsening over the past few weeks  Consider nutritional as he is not eating well  Will check labs    Personal history of colon polyps, unspecified  Colonoscopy in 2019 in [place] with a small rectal polyp removed  Pathology is unavailable  Will update screening colonoscopy at this time        History of Present Illness   HPI  Rikki Reyes is a 74 y.o. male with a history of alcohol abuse, alcoholic liver disease, hypertension, asthma, diabetes mellitus, BPH, thrombocytopenia,  hyperbilirubinemia, hyperlipidemia who presents to establish care.  Patient recently moved to the area from [place] where he was established with a gastroenterologist.  He is being monitored closely for suspected cirrhosis of the liver.  He has a long history of alcohol abuse.  He reports that he typically would drink half of a liter of bourbon a day.  Imaging from what I can review has consistently shown a normal-appearing liver however he has a chronic thrombocytopenia and hyperbilirubinemia thus raising suspicion for cirrhosis.  In January the patient suffered a severe gastrointestinal virus with severe diarrhea.  This lasted for several days before spontaneously resolving.  There was no nausea or vomiting.  Since that time he has suffered with 1-2 loose stools a day where previously he was having 1 normal formed stool a day.  There has been no rectal bleeding or severe abdominal pain.  He reports that since that time he has had a very poor appetite with early satiety.  He denies nausea.  He still has no vomiting.  He has no heartburn symptoms.  Since the beginning of January he has a documented 25 pound weight loss.  He is not sure if the amount of food he is eating completely accounts for that amount of weight loss.  Because of his symptoms approximately 6 weeks ago he completely stopped drinking alcohol.  He reports that he is now having excessive fatigue issues.  He reports that he is frequently falling asleep during the day which she previously would not.  He is also struggling with a chronic dry cough.  He had previously seen ENT for this years ago.  He was diagnosed with LPR and put on a PPI course.  He responded favorably to this and after a few months he transition to famotidine.  He has been maintaining on famotidine since that time until the cough returned several weeks ago.  He put himself back on over-the-counter omeprazole.  The cough is improved however he does not know if this is related to  starting omeprazole or stopping Flomax.  He reports having an EGD in the distant past but cannot remember any abnormal results.  His last colonoscopy was in 2019 with a small rectal polyp removed.  Pathology is unavailable to me.        Review of Systems  Medical History Reviewed by provider this encounter:  Problems     .  Past Medical History   Past Medical History:   Diagnosis Date    Coronary artery disease     Diabetes mellitus (HCC)     A1C normal.  Metformin discontinued    Hypertension      Past Surgical History:   Procedure Laterality Date    HEMANGIOMA EXCISION  1977    throat    HEMORROIDECTOMY       Family History   Problem Relation Age of Onset    Heart disease Father     Heart disease Brother       reports that he quit smoking about 35 years ago. His smoking use included cigarettes. He started smoking about 55 years ago. He has a 30 pack-year smoking history. He has never used smokeless tobacco. He reports current alcohol use of about 28.0 standard drinks of alcohol per week. He reports that he does not use drugs.  Current Outpatient Medications   Medication Instructions    Aspirin 81 MG CAPS 1 capsule, Daily    carvedilol (COREG) 3.125 mg, Oral, 2 times daily with meals    Cyanocobalamin (B-12) 1000 MCG TABS 1 tablet, Daily    D3-1000 1,000 Units, Daily    omeprazole (PRILOSEC) 40 mg, Oral, Daily    Rosuvastatin Calcium 20 MG CPSP 1 capsule, Oral, Daily   No Known Allergies   Current Outpatient Medications on File Prior to Visit   Medication Sig Dispense Refill    Aspirin 81 MG CAPS Take 1 capsule by mouth in the morning      carvedilol (COREG) 3.125 mg tablet Take 1 tablet (3.125 mg total) by mouth 2 (two) times a day with meals 180 tablet 3    Cholecalciferol (D3-1000) 25 MCG (1000 UT) capsule Take 1,000 Units by mouth daily      Cyanocobalamin (B-12) 1000 MCG TABS Place 1 tablet under the tongue in the morning      Rosuvastatin Calcium 20 MG CPSP Take 1 capsule by mouth in the morning 90 capsule 3  "   [DISCONTINUED] omeprazole (PriLOSEC) 20 mg delayed release capsule       [DISCONTINUED] famotidine (PEPCID) 40 MG tablet Take 40 mg by mouth 2 (two) times a day      [DISCONTINUED] Fluticasone-Salmeterol (Advair) 100-50 mcg/dose inhaler Inhale 1 puff 2 (two) times a day Rinse mouth after use. 60 blister 5    [DISCONTINUED] tamsulosin (FLOMAX) 0.4 mg Take 2 capsules (0.8 mg total) by mouth daily with dinner 180 capsule 3     No current facility-administered medications on file prior to visit.      Social History     Tobacco Use    Smoking status: Former     Current packs/day: 0.00     Average packs/day: 1.5 packs/day for 20.0 years (30.0 ttl pk-yrs)     Types: Cigarettes     Start date: 1970     Quit date: 1990     Years since quittin.2    Smokeless tobacco: Never   Vaping Use    Vaping status: Never Used   Substance and Sexual Activity    Alcohol use: Yes     Alcohol/week: 28.0 standard drinks of alcohol     Types: 28 Standard drinks or equivalent per week    Drug use: Never    Sexual activity: Not Currently     Partners: Female        Objective   /57 (BP Location: Left arm, Patient Position: Sitting, Cuff Size: Standard)   Pulse 78   Temp (!) 97.4 °F (36.3 °C) (Temporal)   Ht 5' 11\" (1.803 m)   Wt 97.5 kg (215 lb)   SpO2 95%   BMI 29.99 kg/m²      Physical Exam  Vitals reviewed.   Constitutional:       Appearance: Normal appearance.   HENT:      Head: Normocephalic and atraumatic.   Eyes:      General: Scleral icterus present.      Extraocular Movements: Extraocular movements intact.      Pupils: Pupils are equal, round, and reactive to light.   Cardiovascular:      Rate and Rhythm: Normal rate and regular rhythm.   Abdominal:      General: Bowel sounds are normal. There is no distension.      Palpations: Abdomen is soft. There is no mass.      Tenderness: There is no abdominal tenderness.   Skin:     General: Skin is warm and dry.      Coloration: Skin is not jaundiced.   Neurological: "      General: No focal deficit present.      Mental Status: He is alert and oriented to person, place, and time.

## 2025-03-21 NOTE — TELEPHONE ENCOUNTER
Called and scheduled colonoscopy at [facility] on Tuesday 3/25/25. Sent Miralax/Dulcolax instructions to his email.

## 2025-03-22 LAB — VIT B12 SERPL-MCNC: 4762 PG/ML (ref 180–914)

## 2025-03-24 ENCOUNTER — TELEPHONE (OUTPATIENT)
Dept: GASTROENTEROLOGY | Facility: CLINIC | Age: 75
End: 2025-03-24

## 2025-03-24 ENCOUNTER — RESULTS FOLLOW-UP (OUTPATIENT)
Dept: GASTROENTEROLOGY | Facility: CLINIC | Age: 75
End: 2025-03-24

## 2025-03-24 DIAGNOSIS — D64.9 ACUTE ANEMIA: Primary | ICD-10-CM

## 2025-03-24 NOTE — RESULT ENCOUNTER NOTE
Spoke to patient.  Reviewed results.  Sent a message to Dr. Nicole who is doing his procedures tomorrow.  Referred to hematology  He has been taking a B12 supplement and I advised to stop  Awaiting folic acid and celiac labs

## 2025-03-24 NOTE — TELEPHONE ENCOUNTER
Routing to PA      Patient was returning call to office asking to speak with PA    He was not understanding the message that was left.    Please advise. Thank you !

## 2025-03-24 NOTE — TELEPHONE ENCOUNTER
Patients GI provider:  KARLI Oh    Number to return call: [phone]    Reason for call: Pt returning John call, requesting to speak w/ her. I called the office and spoke w/ Apurva who stated John is in w/ pt and requested I send message to the bin. Please reach out to pt on this, thank you.    Scheduled procedure/appointment date if applicable: Procedure 3/25/25

## 2025-03-25 ENCOUNTER — ANESTHESIA EVENT (OUTPATIENT)
Dept: GASTROENTEROLOGY | Facility: HOSPITAL | Age: 75
End: 2025-03-25
Payer: MEDICARE

## 2025-03-25 ENCOUNTER — ANESTHESIA (OUTPATIENT)
Dept: GASTROENTEROLOGY | Facility: HOSPITAL | Age: 75
End: 2025-03-25
Payer: MEDICARE

## 2025-03-25 ENCOUNTER — HOSPITAL ENCOUNTER (OUTPATIENT)
Dept: GASTROENTEROLOGY | Facility: HOSPITAL | Age: 75
Setting detail: OUTPATIENT SURGERY
Discharge: HOME | End: 2025-03-25
Payer: MEDICARE

## 2025-03-25 VITALS
HEART RATE: 71 BPM | TEMPERATURE: 97.5 F | RESPIRATION RATE: 35 BRPM | HEIGHT: 71 IN | OXYGEN SATURATION: 97 % | BODY MASS INDEX: 29.96 KG/M2 | WEIGHT: 214 LBS | DIASTOLIC BLOOD PRESSURE: 55 MMHG | SYSTOLIC BLOOD PRESSURE: 106 MMHG

## 2025-03-25 DIAGNOSIS — R63.4 WEIGHT LOSS, ABNORMAL: ICD-10-CM

## 2025-03-25 DIAGNOSIS — Z86.0100 PERSONAL HISTORY OF COLON POLYPS, UNSPECIFIED: ICD-10-CM

## 2025-03-25 DIAGNOSIS — R19.4 CHANGE IN BOWEL HABITS: ICD-10-CM

## 2025-03-25 DIAGNOSIS — R68.81 EARLY SATIETY: ICD-10-CM

## 2025-03-25 DIAGNOSIS — R05.3 CHRONIC COUGH: ICD-10-CM

## 2025-03-25 PROBLEM — D64.9 ANEMIA: Status: ACTIVE | Noted: 2025-03-25

## 2025-03-25 LAB
TTG IGA SER IA-ACNC: 0.6 U/ML (ref ?–10)
VIT B1 BLD-SCNC: 87.9 NMOL/L (ref 66.5–200)

## 2025-03-25 PROCEDURE — 88305 TISSUE EXAM BY PATHOLOGIST: CPT | Performed by: PATHOLOGY

## 2025-03-25 PROCEDURE — 88342 IMHCHEM/IMCYTCHM 1ST ANTB: CPT | Performed by: PATHOLOGY

## 2025-03-25 RX ORDER — SODIUM CHLORIDE, SODIUM LACTATE, POTASSIUM CHLORIDE, CALCIUM CHLORIDE 600; 310; 30; 20 MG/100ML; MG/100ML; MG/100ML; MG/100ML
INJECTION, SOLUTION INTRAVENOUS CONTINUOUS PRN
Status: DISCONTINUED | OUTPATIENT
Start: 2025-03-25 | End: 2025-03-25

## 2025-03-25 RX ORDER — LIDOCAINE HYDROCHLORIDE 20 MG/ML
INJECTION, SOLUTION EPIDURAL; INFILTRATION; INTRACAUDAL; PERINEURAL AS NEEDED
Status: DISCONTINUED | OUTPATIENT
Start: 2025-03-25 | End: 2025-03-25

## 2025-03-25 RX ORDER — PROPOFOL 10 MG/ML
INJECTION, EMULSION INTRAVENOUS AS NEEDED
Status: DISCONTINUED | OUTPATIENT
Start: 2025-03-25 | End: 2025-03-25

## 2025-03-25 RX ADMIN — PROPOFOL 100 MG: 10 INJECTION, EMULSION INTRAVENOUS at 08:32

## 2025-03-25 RX ADMIN — SODIUM CHLORIDE, SODIUM LACTATE, POTASSIUM CHLORIDE, AND CALCIUM CHLORIDE: .6; .31; .03; .02 INJECTION, SOLUTION INTRAVENOUS at 08:21

## 2025-03-25 RX ADMIN — LIDOCAINE HYDROCHLORIDE 60 MG: 20 INJECTION, SOLUTION EPIDURAL; INFILTRATION; INTRACAUDAL at 08:32

## 2025-03-25 RX ADMIN — PROPOFOL 140 MCG/KG/MIN: 10 INJECTION, EMULSION INTRAVENOUS at 08:33

## 2025-03-25 NOTE — ANESTHESIA PREPROCEDURE EVALUATION
Procedure:  COLONOSCOPY  EGD    Relevant Problems   ANESTHESIA (within normal limits)      CARDIO   (+) Elevated coronary artery calcium score   (+) Mixed hyperlipidemia   (+) Primary hypertension      ENDO   (+) DM type 2 without retinopathy (HCC)      GI/HEPATIC (within normal limits)      /RENAL   (+) BPH without obstruction/lower urinary tract symptoms      GYN (within normal limits)      HEMATOLOGY   (+) Anemia   (+) Thrombocytopenia (HCC)      MUSCULOSKELETAL (within normal limits)      NEURO/PSYCH (within normal limits)      Other Pediatrics   (+) Gilbert syndrome        Physical Exam    Airway    Mallampati score: II  TM Distance: >3 FB  Neck ROM: full     Dental    implants    Cardiovascular  Cardiovascular exam normal    Pulmonary  Pulmonary exam normal     Other Findings        Anesthesia Plan  ASA Score- 3     Anesthesia Type- IV sedation with anesthesia with ASA Monitors.         Additional Monitors:     Airway Plan:            Plan Factors-Exercise tolerance (METS): >4 METS.    Chart reviewed.   Existing labs reviewed. Patient summary reviewed.    Patient is not a current smoker.              Induction- intravenous.    Postoperative Plan-     Perioperative Resuscitation Plan - Level 1 - Full Code.       Informed Consent- Anesthetic plan and risks discussed with patient.        NPO Status:  Vitals Value Taken Time   Date of last liquid 03/25/25 03/25/25 0744   Time of last liquid 0300 03/25/25 0744   Date of last solid 03/23/25 03/25/25 0744   Time of last solid 2000 03/25/25 0744       Discussed IV Sedation with General Anesthesia as backup with patient including but not limited to risk of cardiac insult, pulmonary complication, stroke, reaction to medications and death. All questions answered and consent was obtained.      EDA this AM.

## 2025-03-25 NOTE — H&P
"  [facility]  History & Physical     PATIENT INFO     Name: Rikki Reyes  YOB: 1950   Age: 74 y.o.   Sex: male   MRN: [id number]     HISTORY OF PRESENT ILLNESS     Rikki Reyes is a 74 y.o. year old male who presents for EGD and colonoscopy for weight loss, change in bowel habits, history of colon polyps, anemia. Last colonoscopy in 2019. No antithrombotics or anticoagulants.     REVIEW OF SYSTEMS     Per the HPI, and otherwise unremarkable.    Historical Information   Past Medical History:   Diagnosis Date    Coronary artery disease     Diabetes mellitus (HCC)     A1C normal.  Metformin discontinued    Hypertension      Past Surgical History:   Procedure Laterality Date    DENTAL IMPLANT      HEMANGIOMA EXCISION      throat    HEMORROIDECTOMY       Social History   Social History     Substance and Sexual Activity   Alcohol Use Not Currently     Social History     Substance and Sexual Activity   Drug Use Never     Social History     Tobacco Use   Smoking Status Former    Current packs/day: 0.00    Average packs/day: 1.5 packs/day for 20.0 years (30.0 ttl pk-yrs)    Types: Cigarettes    Start date: 1970    Quit date: 1990    Years since quittin.2   Smokeless Tobacco Never     Family History   Problem Relation Age of Onset    Heart disease Father     Heart disease Brother         MEDICATIONS & ALLERGIES     Current Outpatient Medications   Medication Instructions    Aspirin 81 MG CAPS 1 capsule, Daily    carvedilol (COREG) 3.125 mg, Oral, 2 times daily with meals    Cyanocobalamin (B-12) 1000 MCG TABS 1 tablet, Daily    D3-1000 1,000 Units, Daily    omeprazole (PRILOSEC) 40 mg, Oral, Daily    Rosuvastatin Calcium 20 MG CPSP 1 capsule, Oral, Daily     No Known Allergies     PHYSICAL EXAM      Objective   Blood pressure 135/63, pulse 80, temperature (!) 97.3 °F (36.3 °C), temperature source Temporal, resp. rate 16, height 5' 11\" (1.803 m), weight 97.1 " kg (214 lb), SpO2 98%. Body mass index is 29.85 kg/m².    General Appearance:   Alert, cooperative, no distress   Lungs:   Equal chest rise, respirations unlabored    Heart:   Regular rate and rhythm   Abdomen:   Soft, non-tender, non-distended; normal bowel sounds; no masses, no organomegaly    Extremities:   No edema       ASSESSMENT & PLAN     This is a 74 y.o. year old male here for EGD and colonoscopy, and he is stable and optimized for his procedure.      Shubham Nicole D.O.  [facility]  Division of Gastroenterology & Hepatology  Available on [employer]  [email or web address]    ** Please Note: This note is constructed using a voice recognition dictation system. **

## 2025-03-25 NOTE — ANESTHESIA POSTPROCEDURE EVALUATION
Post-Op Assessment Note    CV Status:  Stable  Pain Score: 0    Pain management: adequate       Mental Status:  Alert and awake   Hydration Status:  Euvolemic   PONV Controlled:  Controlled   Airway Patency:  Patent  Two or more mitigation strategies used for obstructive sleep apnea   Post Op Vitals Reviewed: Yes    No anethesia notable event occurred.    Staff: CRNA           Last Filed PACU Vitals:  Vitals Value Taken Time   Temp 97.3 °F (36.3 °C) 03/25/25 0904   Pulse 69 03/25/25 0905   BP 95/54 03/25/25 0904   Resp 22 03/25/25 0905   SpO2 100 % 03/25/25 0905       Modified Janet:     Vitals Value Taken Time   Activity 2 03/25/25 0925   Respiration 2 03/25/25 0925   Circulation 2 03/25/25 0925   Consciousness 2 03/25/25 0925   Oxygen Saturation 2 03/25/25 0925     Modified Janet Score: 10

## 2025-03-27 DIAGNOSIS — R11.0 NAUSEA: Primary | ICD-10-CM

## 2025-03-27 RX ORDER — ONDANSETRON 4 MG/1
4 TABLET, FILM COATED ORAL EVERY 8 HOURS PRN
Qty: 45 TABLET | Refills: 0 | Status: SHIPPED | OUTPATIENT
Start: 2025-03-27

## 2025-03-28 ENCOUNTER — HOSPITAL ENCOUNTER (OUTPATIENT)
Dept: CT IMAGING | Facility: HOSPITAL | Age: 75
End: 2025-03-28
Payer: MEDICARE

## 2025-03-28 DIAGNOSIS — E80.6 HYPERBILIRUBINEMIA: ICD-10-CM

## 2025-03-28 DIAGNOSIS — R68.81 EARLY SATIETY: ICD-10-CM

## 2025-03-28 DIAGNOSIS — R19.4 CHANGE IN BOWEL HABITS: ICD-10-CM

## 2025-03-28 DIAGNOSIS — R63.4 WEIGHT LOSS, ABNORMAL: ICD-10-CM

## 2025-03-28 DIAGNOSIS — D69.6 THROMBOCYTOPENIA (HCC): ICD-10-CM

## 2025-03-28 PROCEDURE — 88342 IMHCHEM/IMCYTCHM 1ST ANTB: CPT | Performed by: PATHOLOGY

## 2025-03-28 PROCEDURE — 88305 TISSUE EXAM BY PATHOLOGIST: CPT | Performed by: PATHOLOGY

## 2025-03-28 PROCEDURE — 74177 CT ABD & PELVIS W/CONTRAST: CPT

## 2025-03-28 RX ADMIN — IOHEXOL 100 ML: 350 INJECTION, SOLUTION INTRAVENOUS at 11:10

## 2025-03-31 ENCOUNTER — TELEPHONE (OUTPATIENT)
Dept: GASTROENTEROLOGY | Facility: AMBULARY SURGERY CENTER | Age: 75
End: 2025-03-31

## 2025-03-31 NOTE — TELEPHONE ENCOUNTER
Pt is calling to schedule his follow up with Dr Nicole, willing to go to [facility] or [facility]. Had recent colon / EGD and wants to discuss next steps

## 2025-04-04 ENCOUNTER — HOSPITAL ENCOUNTER (OUTPATIENT)
Dept: ULTRASOUND IMAGING | Facility: HOSPITAL | Age: 75
End: 2025-04-04
Payer: MEDICARE

## 2025-04-04 DIAGNOSIS — D69.6 THROMBOCYTOPENIA (HCC): ICD-10-CM

## 2025-04-04 DIAGNOSIS — E80.6 HYPERBILIRUBINEMIA: ICD-10-CM

## 2025-04-04 PROCEDURE — 76981 USE PARENCHYMA: CPT

## 2025-04-04 PROCEDURE — 76705 ECHO EXAM OF ABDOMEN: CPT

## 2025-04-08 ENCOUNTER — RESULTS FOLLOW-UP (OUTPATIENT)
Age: 75
End: 2025-04-08

## 2025-04-08 ENCOUNTER — APPOINTMENT (OUTPATIENT)
Age: 75
End: 2025-04-08
Payer: MEDICARE

## 2025-04-08 ENCOUNTER — OFFICE VISIT (OUTPATIENT)
Dept: HEMATOLOGY ONCOLOGY | Facility: CLINIC | Age: 75
End: 2025-04-08
Payer: MEDICARE

## 2025-04-08 ENCOUNTER — TELEPHONE (OUTPATIENT)
Dept: HEMATOLOGY ONCOLOGY | Facility: CLINIC | Age: 75
End: 2025-04-08

## 2025-04-08 VITALS
SYSTOLIC BLOOD PRESSURE: 110 MMHG | HEART RATE: 84 BPM | TEMPERATURE: 97.4 F | BODY MASS INDEX: 29.68 KG/M2 | WEIGHT: 212 LBS | HEIGHT: 71 IN | DIASTOLIC BLOOD PRESSURE: 60 MMHG | RESPIRATION RATE: 16 BRPM | OXYGEN SATURATION: 98 %

## 2025-04-08 DIAGNOSIS — D64.9 ACUTE ANEMIA: ICD-10-CM

## 2025-04-08 DIAGNOSIS — D69.6 THROMBOCYTOPENIA (HCC): Primary | ICD-10-CM

## 2025-04-08 DIAGNOSIS — R05.9 COUGH, UNSPECIFIED TYPE: ICD-10-CM

## 2025-04-08 DIAGNOSIS — D69.6 THROMBOCYTOPENIA (HCC): ICD-10-CM

## 2025-04-08 LAB
ALBUMIN SERPL BCG-MCNC: 3.5 G/DL (ref 3.5–5)
ALP SERPL-CCNC: 59 U/L (ref 34–104)
ALT SERPL W P-5'-P-CCNC: 7 U/L (ref 7–52)
ANION GAP SERPL CALCULATED.3IONS-SCNC: 8 MMOL/L (ref 4–13)
ANISOCYTOSIS BLD QL SMEAR: PRESENT
AST SERPL W P-5'-P-CCNC: 9 U/L (ref 13–39)
BASOPHILS # BLD MANUAL: 0 THOUSAND/UL (ref 0–0.1)
BASOPHILS NFR MAR MANUAL: 0 % (ref 0–1)
BILIRUB SERPL-MCNC: 2.83 MG/DL (ref 0.2–1)
BUN SERPL-MCNC: 14 MG/DL (ref 5–25)
CALCIUM SERPL-MCNC: 8.4 MG/DL (ref 8.4–10.2)
CHLORIDE SERPL-SCNC: 100 MMOL/L (ref 96–108)
CO2 SERPL-SCNC: 27 MMOL/L (ref 21–32)
CREAT SERPL-MCNC: 0.91 MG/DL (ref 0.6–1.3)
CRP SERPL QL: 67.5 MG/L
DAT POLY-SP REAG RBC QL: NEGATIVE
EOSINOPHIL # BLD MANUAL: 0.43 THOUSAND/UL (ref 0–0.4)
EOSINOPHIL NFR BLD MANUAL: 4 % (ref 0–6)
ERYTHROCYTE [DISTWIDTH] IN BLOOD BY AUTOMATED COUNT: 21 % (ref 11.6–15.1)
ERYTHROCYTE [SEDIMENTATION RATE] IN BLOOD: 24 MM/HOUR (ref 0–19)
GFR SERPL CREATININE-BSD FRML MDRD: 83 ML/MIN/1.73SQ M
GLUCOSE SERPL-MCNC: 133 MG/DL (ref 65–140)
HCT VFR BLD AUTO: 20.2 % (ref 36.5–49.3)
HGB BLD-MCNC: 6 G/DL (ref 12–17)
IGA SERPL-MCNC: 205 MG/DL (ref 66–433)
IGG SERPL-MCNC: 964 MG/DL (ref 635–1741)
IGM SERPL-MCNC: 62 MG/DL (ref 45–281)
IRON SATN MFR SERPL: 25 % (ref 15–50)
IRON SERPL-MCNC: 56 UG/DL (ref 50–212)
LDH SERPL-CCNC: 129 U/L (ref 140–271)
LYMPHOCYTES # BLD AUTO: 1.51 THOUSAND/UL (ref 0.6–4.47)
LYMPHOCYTES # BLD AUTO: 13 % (ref 14–44)
MACROCYTES BLD QL AUTO: PRESENT
MAGNESIUM SERPL-MCNC: 1.9 MG/DL (ref 1.9–2.7)
MCH RBC QN AUTO: 37.1 PG (ref 26.8–34.3)
MCHC RBC AUTO-ENTMCNC: 29.6 G/DL (ref 31.4–37.4)
MCV RBC AUTO: 125 FL (ref 82–98)
METAMYELOCYTE ABSOLUTE CT: 0.22 THOUSAND/UL (ref 0–0.1)
METAMYELOCYTES NFR BLD MANUAL: 2 % (ref 0–1)
MONOCYTES # BLD AUTO: 0.32 THOUSAND/UL (ref 0–1.22)
MONOCYTES NFR BLD: 3 % (ref 4–12)
NEUTROPHILS # BLD MANUAL: 8.29 THOUSAND/UL (ref 1.85–7.62)
NEUTS BAND NFR BLD MANUAL: 8 % (ref 0–8)
NEUTS SEG NFR BLD AUTO: 69 % (ref 43–75)
OVALOCYTES BLD QL SMEAR: PRESENT
PLATELET # BLD AUTO: 35 THOUSANDS/UL (ref 149–390)
PLATELET BLD QL SMEAR: ABNORMAL
PMV BLD AUTO: 13.3 FL (ref 8.9–12.7)
POIKILOCYTOSIS BLD QL SMEAR: PRESENT
POLYCHROMASIA BLD QL SMEAR: PRESENT
POTASSIUM SERPL-SCNC: 3.6 MMOL/L (ref 3.5–5.3)
PROT SERPL-MCNC: 5.9 G/DL (ref 6.4–8.4)
RBC # BLD AUTO: 1.61 MILLION/UL (ref 3.88–5.62)
RBC MORPH BLD: PRESENT
RETICS # AUTO: ABNORMAL 10*3/UL (ref 14356–105094)
RETICS # CALC: 10.72 % (ref 0.37–1.87)
SODIUM SERPL-SCNC: 135 MMOL/L (ref 135–147)
TIBC SERPL-MCNC: 225.4 UG/DL (ref 250–450)
TRANSFERRIN SERPL-MCNC: 161 MG/DL (ref 203–362)
UIBC SERPL-MCNC: 169 UG/DL (ref 155–355)
VARIANT LYMPHS # BLD AUTO: 1 %
WBC # BLD AUTO: 10.76 THOUSAND/UL (ref 4.31–10.16)

## 2025-04-08 PROCEDURE — 36415 COLL VENOUS BLD VENIPUNCTURE: CPT

## 2025-04-08 PROCEDURE — 85027 COMPLETE CBC AUTOMATED: CPT

## 2025-04-08 PROCEDURE — 82728 ASSAY OF FERRITIN: CPT

## 2025-04-08 PROCEDURE — 83550 IRON BINDING TEST: CPT

## 2025-04-08 PROCEDURE — 86334 IMMUNOFIX E-PHORESIS SERUM: CPT

## 2025-04-08 PROCEDURE — 99205 OFFICE O/P NEW HI 60 MIN: CPT | Performed by: INTERNAL MEDICINE

## 2025-04-08 PROCEDURE — 85045 AUTOMATED RETICULOCYTE COUNT: CPT

## 2025-04-08 PROCEDURE — 85652 RBC SED RATE AUTOMATED: CPT

## 2025-04-08 PROCEDURE — 83540 ASSAY OF IRON: CPT

## 2025-04-08 PROCEDURE — 83010 ASSAY OF HAPTOGLOBIN QUANT: CPT

## 2025-04-08 PROCEDURE — 80053 COMPREHEN METABOLIC PANEL: CPT

## 2025-04-08 PROCEDURE — 84165 PROTEIN E-PHORESIS SERUM: CPT

## 2025-04-08 PROCEDURE — 86880 COOMBS TEST DIRECT: CPT

## 2025-04-08 PROCEDURE — 82232 ASSAY OF BETA-2 PROTEIN: CPT

## 2025-04-08 PROCEDURE — 82784 ASSAY IGA/IGD/IGG/IGM EACH: CPT

## 2025-04-08 PROCEDURE — 82607 VITAMIN B-12: CPT

## 2025-04-08 PROCEDURE — 85007 BL SMEAR W/DIFF WBC COUNT: CPT

## 2025-04-08 PROCEDURE — 86140 C-REACTIVE PROTEIN: CPT

## 2025-04-08 PROCEDURE — 83521 IG LIGHT CHAINS FREE EACH: CPT

## 2025-04-08 PROCEDURE — 83615 LACTATE (LD) (LDH) ENZYME: CPT

## 2025-04-08 PROCEDURE — 82668 ASSAY OF ERYTHROPOIETIN: CPT | Performed by: INTERNAL MEDICINE

## 2025-04-08 PROCEDURE — 83735 ASSAY OF MAGNESIUM: CPT | Performed by: INTERNAL MEDICINE

## 2025-04-08 RX ORDER — TAMSULOSIN HYDROCHLORIDE 0.4 MG/1
CAPSULE ORAL
COMMUNITY
Start: 2020-01-01

## 2025-04-08 NOTE — H&P (VIEW-ONLY)
Name: Rikki Reyes      : 1950      MRN: [id number]  Encounter Provider: Navarro Woods MD  Encounter Date: 2025   Encounter department: [facility]AT[facility] [place]  :  Assessment & Plan  Acute anemia  I did review the result of the recent workup with the patient and his wife which showed significant acute macrocytic anemia and thrombocytopenia which is highly suspicious for a bone marrow disorder.  The patient was asked to pursue extensive workup very soon including a bone marrow biopsy as soon as possible to rule out any significant pathology in the bone marrow.  He was told that he might be a candidate for blood transfusion if we see further drop of his hemoglobin level below 7 g.    Orders:    Ambulatory Referral to Hematology / Oncology    CBC and differential; Future    Comprehensive metabolic panel; Future    C-reactive protein; Future    Magnesium    LD,Blood; Future    Iron Panel (Includes Ferritin, Iron Sat%, Iron, and TIBC); Future    Ferritin; Future    Haptoglobin; Future    Hemolysis Smear; Future    Erythropoietin    Direct antiglobulin test; Future    Sedimentation rate, automated; Future    Reticulocytes; Future    Vitamin B12; Future    IgG, IgA, IgM; Future    Immunoglobulin free LT chains blood; Future    Beta 2 microglobulin, serum; Future    Protein, Total and Protein Electrophoresis with Immunofixation; Future    Ambulatory referral to Interventional Radiology; Future    Thrombocytopenia (HCC)  As above.  Orders:    CBC and differential; Future    Comprehensive metabolic panel; Future    C-reactive protein; Future    Magnesium    LD,Blood; Future    Iron Panel (Includes Ferritin, Iron Sat%, Iron, and TIBC); Future    Ferritin; Future    Haptoglobin; Future    Hemolysis Smear; Future    Erythropoietin    Direct antiglobulin test; Future    Sedimentation rate, automated; Future    Reticulocytes; Future    Vitamin B12; Future    IgG, IgA, IgM; Future     Immunoglobulin free LT chains blood; Future    Beta 2 microglobulin, serum; Future    Protein, Total and Protein Electrophoresis with Immunofixation; Future    Ambulatory referral to Interventional Radiology; Future    Cough, unspecified type  He did complain about chronic cough and significant weight loss.  CT scan of the chest was recommended to rule out any hint of malignant etiology.  Orders:    CT chest w contrast; Future        Return in about 3 weeks (around 4/29/2025) for Office Visit 20 min, Labs, Procedure, Imaging.    History of Present Illness   Chief Complaint   Patient presents with    Consult   This is a 74-year-old with history of coronary artery disease, diabetes mellitus and hypertension.  He reported losing 25 pounds with an the last 2 to 3 months.  He also seems to be very exhausted and very pale.  The patient was found to have significant drop of his hemoglobin level according to the blood work from 3/21/2025 where his hemoglobin was around 7.6 with MCV of 420.  White cell count was 12.4 with a platelet count of 72.  Creatinine was 0.8 with normal calcium and her enzymes.  Vitamin B12 was elevated above 4000.  TSH was normal.  Iron panel showed ferritin of 497 with saturation of 18%.  He did have upper and lower endoscopy on 3/25/2025 which was negative for any obvious malignant process or bleeding.  He denied obvious bleeding from any sites.  He    He did have a CT scan of the abdomen pelvis with contrast on 3/28/2025 which showed  IMPRESSION:     1.  Although there are no overt CT findings for cirrhosis there are findings for a subtle recanalized umbilical vein. In addition, subtle branching hyperdensity in the left lobe of the liver laterally best seen on the portal venous phase images, matching   hepatic veins in intensity may represent arteriovenous malformation. This could be further evaluated with dedicated liver MRI with and without IV contrast.  2.  Splenomegaly. Shotty appearing  retroperitoneal lymph nodes some which are mildly prominent, nonspecific could be reactive. A lymphoproliferative process is not excluded. Recommend follow-up CT in 3-6 months.  3.  Mild pelvic ascites.  4.  Scattered tree-in-bud opacities at the bilateral lung bases suggesting bronchiolitis. There is mild pulmonary nodularity measuring up to 7 mm in size. Based on current Fleischner Society 2017 Guidelines on incidental pulmonary nodule, follow-up   non-contrast chest CT is recommended at 3-6 months from the initial examination and, if stable at that time, an additional follow-up is recommended for 18-24 months from the initial examination.  Ultrasound of the liver and ultrasound elastography was negative for obvious hint of liver cirrhosis.  Review of Systems   Constitutional:  Positive for activity change, appetite change, fatigue and unexpected weight change. Negative for chills and fever.   HENT:  Positive for hearing loss. Negative for ear pain and sore throat.    Eyes:  Negative for pain and visual disturbance.   Respiratory:  Positive for cough and shortness of breath.    Cardiovascular:  Negative for chest pain and palpitations.   Gastrointestinal:  Positive for diarrhea and nausea. Negative for abdominal pain and vomiting.   Genitourinary:  Negative for dysuria and hematuria.   Musculoskeletal:  Negative for arthralgias and back pain.   Skin:  Negative for color change and rash.   Neurological:  Positive for dizziness and numbness. Negative for seizures and syncope.   Psychiatric/Behavioral:  Positive for sleep disturbance.    All other systems reviewed and are negative.    Medical History Reviewed by provider this encounter:  Tobacco  Allergies  Meds  Problems  Med Hx  Surg Hx  Fam Hx     .  Current Outpatient Medications on File Prior to Visit   Medication Sig Dispense Refill    Aspirin 81 MG CAPS Take 1 capsule by mouth in the morning      carvedilol (COREG) 3.125 mg tablet Take 1 tablet (3.125  "mg total) by mouth 2 (two) times a day with meals 180 tablet 3    Cholecalciferol (D3-1000) 25 MCG (1000 UT) capsule Take 1,000 Units by mouth daily      omeprazole (PriLOSEC) 40 MG capsule Take 1 capsule (40 mg total) by mouth daily 30 capsule 3    ondansetron (ZOFRAN) 4 mg tablet Take 1 tablet (4 mg total) by mouth every 8 (eight) hours as needed for nausea or vomiting 45 tablet 0    Rosuvastatin Calcium 20 MG CPSP Take 1 capsule by mouth in the morning 90 capsule 3    tamsulosin (FLOMAX) 0.4 mg       Cyanocobalamin (B-12) 1000 MCG TABS Place 1 tablet under the tongue in the morning (Patient not taking: Reported on 2025)       No current facility-administered medications on file prior to visit.      Social History     Tobacco Use    Smoking status: Former     Current packs/day: 0.00     Average packs/day: 1.5 packs/day for 20.0 years (30.0 ttl pk-yrs)     Types: Cigarettes     Start date: 1970     Quit date: 1990     Years since quittin.2    Smokeless tobacco: Never   Vaping Use    Vaping status: Never Used   Substance and Sexual Activity    Alcohol use: Not Currently    Drug use: Never    Sexual activity: Not Currently     Partners: Female         Objective   /60 (BP Location: Left arm, Patient Position: Sitting, Cuff Size: Adult)   Pulse 84   Temp (!) 97.4 °F (36.3 °C)   Resp 16   Ht 5' 11\" (1.803 m)   Wt 96.2 kg (212 lb)   SpO2 98%   BMI 29.57 kg/m²     Pain Screening:  Pain Score: 0-No pain  ECOG   3  Physical Exam  Constitutional:       Appearance: He is well-developed. He is ill-appearing.   HENT:      Head: Normocephalic and atraumatic.      Nose: Nose normal.   Eyes:      General: No scleral icterus.        Right eye: No discharge.         Left eye: No discharge.      Conjunctiva/sclera: Conjunctivae normal.      Pupils: Pupils are equal, round, and reactive to light.   Neck:      Thyroid: No thyromegaly.      Trachea: No tracheal deviation.   Cardiovascular:      Rate and " Rhythm: Normal rate and regular rhythm.      Heart sounds: Normal heart sounds. No murmur heard.     No friction rub.   Pulmonary:      Effort: Pulmonary effort is normal. No respiratory distress.      Breath sounds: Normal breath sounds. No wheezing or rales.   Chest:      Chest wall: No tenderness.   Abdominal:      General: There is no distension.      Palpations: Abdomen is soft. There is no hepatomegaly or splenomegaly.      Tenderness: There is no abdominal tenderness. There is no guarding or rebound.   Musculoskeletal:         General: No tenderness or deformity. Normal range of motion.      Cervical back: Normal range of motion and neck supple.   Lymphadenopathy:      Cervical: No cervical adenopathy.   Skin:     General: Skin is warm and dry.      Coloration: Skin is pale.      Findings: No erythema or rash.   Neurological:      Mental Status: He is alert and oriented to person, place, and time.      Cranial Nerves: No cranial nerve deficit.      Coordination: Coordination normal.      Deep Tendon Reflexes: Reflexes are normal and symmetric.   Psychiatric:         Behavior: Behavior normal.         Thought Content: Thought content normal.         Judgment: Judgment normal.         Labs: I have reviewed the following labs:  Lab Results   Component Value Date/Time    WBC 12.44 (H) 03/21/2025 01:18 PM    RBC 2.09 (L) 03/21/2025 01:18 PM    Hemoglobin 7.6 (L) 03/21/2025 01:18 PM    Hematocrit 25.1 (L) 03/21/2025 01:18 PM     (H) 03/21/2025 01:18 PM    MCH 36.4 (H) 03/21/2025 01:18 PM    RDW 17.4 (H) 03/21/2025 01:18 PM    Platelets 72 (L) 03/21/2025 01:18 PM    Segmented % 73 10/07/2024 12:53 PM    Lymphocytes % 20 10/07/2024 12:53 PM    Monocytes % 4 10/07/2024 12:53 PM    Eosinophils Relative 3 10/07/2024 12:53 PM    Basophils Relative 0 10/07/2024 12:53 PM    Immature Grans % 0 10/07/2024 12:53 PM    Absolute Neutrophils 3.47 10/07/2024 12:53 PM     Lab Results   Component Value Date/Time    Potassium  3.4 (L) 03/21/2025 01:18 PM    Chloride 102 03/21/2025 01:18 PM    CO2 27 03/21/2025 01:18 PM    BUN 14 03/21/2025 01:18 PM    Creatinine 0.86 03/21/2025 01:18 PM    Glucose, Fasting 134 (H) 03/21/2025 01:18 PM    Calcium 9.0 03/21/2025 01:18 PM    AST 16 03/21/2025 01:18 PM    ALT 23 03/21/2025 01:18 PM    Alkaline Phosphatase 59 03/21/2025 01:18 PM    Total Protein 6.3 (L) 03/21/2025 01:18 PM    Albumin 3.6 03/21/2025 01:18 PM    Total Bilirubin 1.65 (H) 03/21/2025 01:18 PM    eGFR 86 03/21/2025 01:18 PM     Lab Results   Component Value Date/Time    WBC 12.44 (H) 03/21/2025 01:18 PM    RBC 2.09 (L) 03/21/2025 01:18 PM    Hemoglobin 7.6 (L) 03/21/2025 01:18 PM    Hematocrit 25.1 (L) 03/21/2025 01:18 PM     (H) 03/21/2025 01:18 PM    MCH 36.4 (H) 03/21/2025 01:18 PM    RDW 17.4 (H) 03/21/2025 01:18 PM    Platelets 72 (L) 03/21/2025 01:18 PM    Segmented % 73 10/07/2024 12:53 PM    Lymphocytes % 20 10/07/2024 12:53 PM    Monocytes % 4 10/07/2024 12:53 PM    Eosinophils Relative 3 10/07/2024 12:53 PM    Basophils Relative 0 10/07/2024 12:53 PM    Immature Grans % 0 10/07/2024 12:53 PM    Absolute Neutrophils 3.47 10/07/2024 12:53 PM      Lab Results   Component Value Date/Time    Sodium 138 03/21/2025 01:18 PM    Potassium 3.4 (L) 03/21/2025 01:18 PM    Chloride 102 03/21/2025 01:18 PM    CO2 27 03/21/2025 01:18 PM    ANION GAP 9 03/21/2025 01:18 PM    BUN 14 03/21/2025 01:18 PM    Creatinine 0.86 03/21/2025 01:18 PM    Glucose, Fasting 134 (H) 03/21/2025 01:18 PM    Calcium 9.0 03/21/2025 01:18 PM    AST 16 03/21/2025 01:18 PM    ALT 23 03/21/2025 01:18 PM    Alkaline Phosphatase 59 03/21/2025 01:18 PM    Total Protein 6.3 (L) 03/21/2025 01:18 PM    Albumin 3.6 03/21/2025 01:18 PM    Total Bilirubin 1.65 (H) 03/21/2025 01:18 PM    eGFR 86 03/21/2025 01:18 PM      Lab Results   Component Value Date/Time    Iron 36 (L) 03/21/2025 01:18 PM    Iron Saturation 18 03/21/2025 01:18 PM    Ferritin 497 (H) 03/21/2025  01:18 PM    Vitamin B-12 4,762 (H) 03/21/2025 01:18 PM    Folate 6.8 03/21/2025 01:18 PM      Results for orders placed or performed during the hospital encounter of 03/25/25   Tissue Exam   Result Value Ref Range    Case Report       Surgical Pathology Report                         Case: [id number]                                  Authorizing Provider:  Shubham Nicole DO              Collected:           03/25/2025 0835              Ordering Location:     [facility] [place] Received:            03/25/2025 1045                                     Endoscopy                                                                    Pathologist:           Dianna Campos MD                                                       Specimens:   A) - Duodenum, duodenal bx  R/O  Celiac                                                             B) - Stomach, gastric bx  R/O  HP                                                                   C) - Ileocecal valve, Cecal Polyp, cold snare                                                       D) - Colon, Random Colon Cold Bx R/O Microscopic Colitis                                            E) - Large Intestine, Transverse Colon, Transverse Colon Polyp, Cold Snare                 Addendum       The purpose of this supplemental report is to add the result of immunostains performed on the Stomach  biopsy     -Pankeratin stain MCK is used in evaluation and highlighted surface epithelium. Stromal cells are negative for staining. Appropriate positive and negative controls reacted appropriately       Final Diagnosis       A. Duodenum, duodenal bx  R/O  Celiac, biopsy:    -Benign small intestinal mucosa with retained villous architecture and no evidence of increased intraepithelial lymphocytes.   -Mild non-specific chronic inflammation of lamina propria seen.. see note  Few foci of eosinophilic aggregates ( ~35-40/HPF) in lamina propria   -No evidence of dysplasia or malignancy.      Note  These findings raise the concern for gastroenterocolitis, drug reaction or parasitic infection. Clinical correlation is recommended.     B. Stomach, gastric bx  R/O  HP,  biopsy:    -Benign gastric-oxyntoantral type mucosa with mild chronic inactive gastritis.  -No evidence of Paneth cell metaplasia or atrophic gastritis.  -No evidence of dysplasia or malignancy.  -No H.Pylori organisms identified on  H&E stained slide       C. Ileocecal valve, Cecal Polyp, cold snare:  - Colonic polyp with focal serrated features, suggestive of sessile serrated adenoma  - No evidence of malignancy.      D. Colon, Random Colon Cold Bx R/O Microscopic Colitis:  -Benign colonic mucosa with no significant pathological chnages  -No evidence of active inflammation  -No evidence of dysplasia or malignancy     E. Large Intestine, Transverse Colon, Transverse Colon Polyp, Cold Snare:  -Fragments of tubular adenoma.  -No evidence of high grade dysplasia or malignancy           Note       Interpretation performed at [facility], [address] [address]        Additional Information       All reported additional testing was performed with appropriately reactive controls.  These tests were developed and their performance characteristics determined by [facility] or appropriate performing facility, though some tests may be performed on tissues which have not been validated for performance characteristics (such as staining performed on alcohol exposed cell blocks and decalcified tissues).  Results should be interpreted with caution and in the context of the patients’ clinical condition. These tests may not be cleared or approved by the U.S. Food and Drug Administration, though the FDA has determined that such clearance or approval is not necessary. These tests are used for clinical purposes and they should not be regarded as investigational or for research. This laboratory has  "been approved by [facility], designated as a high-complexity laboratory and is qualified to perform these tests.  .      Synoptic Checklist          COLON/RECTUM POLYP FORM - GI - All Specimens          :    Adenoma(s)      Gross Description       A. The specimen is received in formalin, labeled with the patient's name and hospital number, and is designated \" duodenal biopsy rule out celiac\".  The specimen consists of multiple pale-tan soft tissue fragments measuring in aggregate 0.7 x 0.6 x 0.1 cm.  Entirely submitted. Screened cassette.  B. The specimen is received in formalin, labeled with the patient's name and hospital number, and is designated \" gastric biopsy rule out H. pylori\".  The specimen consists of multiple pale-tan to colorless friable appearing soft tissue fragments measuring in aggregate 0.7 x 0.7 x 0.1 cm.  Entirely submitted. Screened cassette.  C. The specimen is received in formalin, labeled with the patient's name and hospital number, and is designated \" cecal polyp\".  The specimen consists of multiple pale-tan soft tissue fragments measuring in aggregate 0.7 x 0.7 x 0.1 cm.  Entirely submitted. Screened cassette.  D. The specimen is received in formalin, labeled with the patient's name and hospital number, and is designated \" random colon biopsy rule out microscopic colitis\".  The specimen consists of multiple pale-tan soft tissue fragments measuring in aggregate 0.7 x 0.6 x 0.1 cm.  Entirely submitted. Screened cassette.  E. The specimen is received in formalin, labeled with the patient's name and hospital number, and is designated \" transverse colon polyp\".  The specimen consists of multiple pale-tan to yellow fragments of soft tissue versus fecal matter, measuring in aggregate 0.6 x 0.5 x 0.1 cm.  Entirely submitted. Screened cassette.    Note: The estimated total formalin fixation time based upon information provided by the submitting clinician and the standard processing schedule is under 72 " walter Presley

## 2025-04-08 NOTE — TELEPHONE ENCOUNTER
Phoned pt and spoke to pts wife to let her know that Dr nunez wants pt to have CBC drawn twice a week. Pt had CBC and other labs drawn today but they are pending. I let wife know that once we see the results of CBC tomorrow we can let her know when he should have repeat Cbc, we normally like pt to go Mon and Thurs so we have time to make arrangements for any blood or olt transfusions. Pt is already scheduled to have a bone marrow bx in IR.

## 2025-04-08 NOTE — PROGRESS NOTES
Name: Rikki Reyes      : 1950      MRN: [id number]  Encounter Provider: Navarro Woods MD  Encounter Date: 2025   Encounter department: [facility] [place]  :  Assessment & Plan  Acute anemia  I did review the result of the recent workup with the patient and his wife which showed significant acute macrocytic anemia and thrombocytopenia which is highly suspicious for a bone marrow disorder.  The patient was asked to pursue extensive workup very soon including a bone marrow biopsy as soon as possible to rule out any significant pathology in the bone marrow.  He was told that he might be a candidate for blood transfusion if we see further drop of his hemoglobin level below 7 g.    Orders:    Ambulatory Referral to Hematology / Oncology    CBC and differential; Future    Comprehensive metabolic panel; Future    C-reactive protein; Future    Magnesium    LD,Blood; Future    Iron Panel (Includes Ferritin, Iron Sat%, Iron, and TIBC); Future    Ferritin; Future    Haptoglobin; Future    Hemolysis Smear; Future    Erythropoietin    Direct antiglobulin test; Future    Sedimentation rate, automated; Future    Reticulocytes; Future    Vitamin B12; Future    IgG, IgA, IgM; Future    Immunoglobulin free LT chains blood; Future    Beta 2 microglobulin, serum; Future    Protein, Total and Protein Electrophoresis with Immunofixation; Future    Ambulatory referral to Interventional Radiology; Future    Thrombocytopenia (HCC)  As above.  Orders:    CBC and differential; Future    Comprehensive metabolic panel; Future    C-reactive protein; Future    Magnesium    LD,Blood; Future    Iron Panel (Includes Ferritin, Iron Sat%, Iron, and TIBC); Future    Ferritin; Future    Haptoglobin; Future    Hemolysis Smear; Future    Erythropoietin    Direct antiglobulin test; Future    Sedimentation rate, automated; Future    Reticulocytes; Future    Vitamin B12; Future    IgG, IgA, IgM; Future     Immunoglobulin free LT chains blood; Future    Beta 2 microglobulin, serum; Future    Protein, Total and Protein Electrophoresis with Immunofixation; Future    Ambulatory referral to Interventional Radiology; Future    Cough, unspecified type  He did complain about chronic cough and significant weight loss.  CT scan of the chest was recommended to rule out any hint of malignant etiology.  Orders:    CT chest w contrast; Future        Return in about 3 weeks (around 4/29/2025) for Office Visit 20 min, Labs, Procedure, Imaging.    History of Present Illness   Chief Complaint   Patient presents with    Consult   This is a 74-year-old with history of coronary artery disease, diabetes mellitus and hypertension.  He reported losing 25 pounds with an the last 2 to 3 months.  He also seems to be very exhausted and very pale.  The patient was found to have significant drop of his hemoglobin level according to the blood work from 3/21/2025 where his hemoglobin was around 7.6 with MCV of 420.  White cell count was 12.4 with a platelet count of 72.  Creatinine was 0.8 with normal calcium and her enzymes.  Vitamin B12 was elevated above 4000.  TSH was normal.  Iron panel showed ferritin of 497 with saturation of 18%.  He did have upper and lower endoscopy on 3/25/2025 which was negative for any obvious malignant process or bleeding.  He denied obvious bleeding from any sites.  He    He did have a CT scan of the abdomen pelvis with contrast on 3/28/2025 which showed  IMPRESSION:     1.  Although there are no overt CT findings for cirrhosis there are findings for a subtle recanalized umbilical vein. In addition, subtle branching hyperdensity in the left lobe of the liver laterally best seen on the portal venous phase images, matching   hepatic veins in intensity may represent arteriovenous malformation. This could be further evaluated with dedicated liver MRI with and without IV contrast.  2.  Splenomegaly. Shotty appearing  retroperitoneal lymph nodes some which are mildly prominent, nonspecific could be reactive. A lymphoproliferative process is not excluded. Recommend follow-up CT in 3-6 months.  3.  Mild pelvic ascites.  4.  Scattered tree-in-bud opacities at the bilateral lung bases suggesting bronchiolitis. There is mild pulmonary nodularity measuring up to 7 mm in size. Based on current Fleischner Society 2017 Guidelines on incidental pulmonary nodule, follow-up   non-contrast chest CT is recommended at 3-6 months from the initial examination and, if stable at that time, an additional follow-up is recommended for 18-24 months from the initial examination.  Ultrasound of the liver and ultrasound elastography was negative for obvious hint of liver cirrhosis.  Review of Systems   Constitutional:  Positive for activity change, appetite change, fatigue and unexpected weight change. Negative for chills and fever.   HENT:  Positive for hearing loss. Negative for ear pain and sore throat.    Eyes:  Negative for pain and visual disturbance.   Respiratory:  Positive for cough and shortness of breath.    Cardiovascular:  Negative for chest pain and palpitations.   Gastrointestinal:  Positive for diarrhea and nausea. Negative for abdominal pain and vomiting.   Genitourinary:  Negative for dysuria and hematuria.   Musculoskeletal:  Negative for arthralgias and back pain.   Skin:  Negative for color change and rash.   Neurological:  Positive for dizziness and numbness. Negative for seizures and syncope.   Psychiatric/Behavioral:  Positive for sleep disturbance.    All other systems reviewed and are negative.    Medical History Reviewed by provider this encounter:  Tobacco  Allergies  Meds  Problems  Med Hx  Surg Hx  Fam Hx     .  Current Outpatient Medications on File Prior to Visit   Medication Sig Dispense Refill    Aspirin 81 MG CAPS Take 1 capsule by mouth in the morning      carvedilol (COREG) 3.125 mg tablet Take 1 tablet (3.125  "mg total) by mouth 2 (two) times a day with meals 180 tablet 3    Cholecalciferol (D3-1000) 25 MCG (1000 UT) capsule Take 1,000 Units by mouth daily      omeprazole (PriLOSEC) 40 MG capsule Take 1 capsule (40 mg total) by mouth daily 30 capsule 3    ondansetron (ZOFRAN) 4 mg tablet Take 1 tablet (4 mg total) by mouth every 8 (eight) hours as needed for nausea or vomiting 45 tablet 0    Rosuvastatin Calcium 20 MG CPSP Take 1 capsule by mouth in the morning 90 capsule 3    tamsulosin (FLOMAX) 0.4 mg       Cyanocobalamin (B-12) 1000 MCG TABS Place 1 tablet under the tongue in the morning (Patient not taking: Reported on 2025)       No current facility-administered medications on file prior to visit.      Social History     Tobacco Use    Smoking status: Former     Current packs/day: 0.00     Average packs/day: 1.5 packs/day for 20.0 years (30.0 ttl pk-yrs)     Types: Cigarettes     Start date: 1970     Quit date: 1990     Years since quittin.2    Smokeless tobacco: Never   Vaping Use    Vaping status: Never Used   Substance and Sexual Activity    Alcohol use: Not Currently    Drug use: Never    Sexual activity: Not Currently     Partners: Female         Objective   /60 (BP Location: Left arm, Patient Position: Sitting, Cuff Size: Adult)   Pulse 84   Temp (!) 97.4 °F (36.3 °C)   Resp 16   Ht 5' 11\" (1.803 m)   Wt 96.2 kg (212 lb)   SpO2 98%   BMI 29.57 kg/m²     Pain Screening:  Pain Score: 0-No pain  ECOG   3  Physical Exam  Constitutional:       Appearance: He is well-developed. He is ill-appearing.   HENT:      Head: Normocephalic and atraumatic.      Nose: Nose normal.   Eyes:      General: No scleral icterus.        Right eye: No discharge.         Left eye: No discharge.      Conjunctiva/sclera: Conjunctivae normal.      Pupils: Pupils are equal, round, and reactive to light.   Neck:      Thyroid: No thyromegaly.      Trachea: No tracheal deviation.   Cardiovascular:      Rate and " Rhythm: Normal rate and regular rhythm.      Heart sounds: Normal heart sounds. No murmur heard.     No friction rub.   Pulmonary:      Effort: Pulmonary effort is normal. No respiratory distress.      Breath sounds: Normal breath sounds. No wheezing or rales.   Chest:      Chest wall: No tenderness.   Abdominal:      General: There is no distension.      Palpations: Abdomen is soft. There is no hepatomegaly or splenomegaly.      Tenderness: There is no abdominal tenderness. There is no guarding or rebound.   Musculoskeletal:         General: No tenderness or deformity. Normal range of motion.      Cervical back: Normal range of motion and neck supple.   Lymphadenopathy:      Cervical: No cervical adenopathy.   Skin:     General: Skin is warm and dry.      Coloration: Skin is pale.      Findings: No erythema or rash.   Neurological:      Mental Status: He is alert and oriented to person, place, and time.      Cranial Nerves: No cranial nerve deficit.      Coordination: Coordination normal.      Deep Tendon Reflexes: Reflexes are normal and symmetric.   Psychiatric:         Behavior: Behavior normal.         Thought Content: Thought content normal.         Judgment: Judgment normal.         Labs: I have reviewed the following labs:  Lab Results   Component Value Date/Time    WBC 12.44 (H) 03/21/2025 01:18 PM    RBC 2.09 (L) 03/21/2025 01:18 PM    Hemoglobin 7.6 (L) 03/21/2025 01:18 PM    Hematocrit 25.1 (L) 03/21/2025 01:18 PM     (H) 03/21/2025 01:18 PM    MCH 36.4 (H) 03/21/2025 01:18 PM    RDW 17.4 (H) 03/21/2025 01:18 PM    Platelets 72 (L) 03/21/2025 01:18 PM    Segmented % 73 10/07/2024 12:53 PM    Lymphocytes % 20 10/07/2024 12:53 PM    Monocytes % 4 10/07/2024 12:53 PM    Eosinophils Relative 3 10/07/2024 12:53 PM    Basophils Relative 0 10/07/2024 12:53 PM    Immature Grans % 0 10/07/2024 12:53 PM    Absolute Neutrophils 3.47 10/07/2024 12:53 PM     Lab Results   Component Value Date/Time    Potassium  3.4 (L) 03/21/2025 01:18 PM    Chloride 102 03/21/2025 01:18 PM    CO2 27 03/21/2025 01:18 PM    BUN 14 03/21/2025 01:18 PM    Creatinine 0.86 03/21/2025 01:18 PM    Glucose, Fasting 134 (H) 03/21/2025 01:18 PM    Calcium 9.0 03/21/2025 01:18 PM    AST 16 03/21/2025 01:18 PM    ALT 23 03/21/2025 01:18 PM    Alkaline Phosphatase 59 03/21/2025 01:18 PM    Total Protein 6.3 (L) 03/21/2025 01:18 PM    Albumin 3.6 03/21/2025 01:18 PM    Total Bilirubin 1.65 (H) 03/21/2025 01:18 PM    eGFR 86 03/21/2025 01:18 PM     Lab Results   Component Value Date/Time    WBC 12.44 (H) 03/21/2025 01:18 PM    RBC 2.09 (L) 03/21/2025 01:18 PM    Hemoglobin 7.6 (L) 03/21/2025 01:18 PM    Hematocrit 25.1 (L) 03/21/2025 01:18 PM     (H) 03/21/2025 01:18 PM    MCH 36.4 (H) 03/21/2025 01:18 PM    RDW 17.4 (H) 03/21/2025 01:18 PM    Platelets 72 (L) 03/21/2025 01:18 PM    Segmented % 73 10/07/2024 12:53 PM    Lymphocytes % 20 10/07/2024 12:53 PM    Monocytes % 4 10/07/2024 12:53 PM    Eosinophils Relative 3 10/07/2024 12:53 PM    Basophils Relative 0 10/07/2024 12:53 PM    Immature Grans % 0 10/07/2024 12:53 PM    Absolute Neutrophils 3.47 10/07/2024 12:53 PM      Lab Results   Component Value Date/Time    Sodium 138 03/21/2025 01:18 PM    Potassium 3.4 (L) 03/21/2025 01:18 PM    Chloride 102 03/21/2025 01:18 PM    CO2 27 03/21/2025 01:18 PM    ANION GAP 9 03/21/2025 01:18 PM    BUN 14 03/21/2025 01:18 PM    Creatinine 0.86 03/21/2025 01:18 PM    Glucose, Fasting 134 (H) 03/21/2025 01:18 PM    Calcium 9.0 03/21/2025 01:18 PM    AST 16 03/21/2025 01:18 PM    ALT 23 03/21/2025 01:18 PM    Alkaline Phosphatase 59 03/21/2025 01:18 PM    Total Protein 6.3 (L) 03/21/2025 01:18 PM    Albumin 3.6 03/21/2025 01:18 PM    Total Bilirubin 1.65 (H) 03/21/2025 01:18 PM    eGFR 86 03/21/2025 01:18 PM      Lab Results   Component Value Date/Time    Iron 36 (L) 03/21/2025 01:18 PM    Iron Saturation 18 03/21/2025 01:18 PM    Ferritin 497 (H) 03/21/2025  01:18 PM    Vitamin B-12 4,762 (H) 03/21/2025 01:18 PM    Folate 6.8 03/21/2025 01:18 PM      Results for orders placed or performed during the hospital encounter of 03/25/25   Tissue Exam   Result Value Ref Range    Case Report       Surgical Pathology Report                         Case: [id number]                                  Authorizing Provider:  Shubham Nicole DO              Collected:           03/25/2025 0835              Ordering Location:     [facility] [place] Received:            03/25/2025 1045                                     Endoscopy                                                                    Pathologist:           Dianna Campos MD                                                       Specimens:   A) - Duodenum, duodenal bx  R/O  Celiac                                                             B) - Stomach, gastric bx  R/O  HP                                                                   C) - Ileocecal valve, Cecal Polyp, cold snare                                                       D) - Colon, Random Colon Cold Bx R/O Microscopic Colitis                                            E) - Large Intestine, Transverse Colon, Transverse Colon Polyp, Cold Snare                 Addendum       The purpose of this supplemental report is to add the result of immunostains performed on the Stomach  biopsy     -Pankeratin stain MCK is used in evaluation and highlighted surface epithelium. Stromal cells are negative for staining. Appropriate positive and negative controls reacted appropriately       Final Diagnosis       A. Duodenum, duodenal bx  R/O  Celiac, biopsy:    -Benign small intestinal mucosa with retained villous architecture and no evidence of increased intraepithelial lymphocytes.   -Mild non-specific chronic inflammation of lamina propria seen.. see note  Few foci of eosinophilic aggregates ( ~35-40/HPF) in lamina propria   -No evidence of dysplasia or malignancy.      Note  These findings raise the concern for gastroenterocolitis, drug reaction or parasitic infection. Clinical correlation is recommended.     B. Stomach, gastric bx  R/O  HP,  biopsy:    -Benign gastric-oxyntoantral type mucosa with mild chronic inactive gastritis.  -No evidence of Paneth cell metaplasia or atrophic gastritis.  -No evidence of dysplasia or malignancy.  -No H.Pylori organisms identified on  H&E stained slide       C. Ileocecal valve, Cecal Polyp, cold snare:  - Colonic polyp with focal serrated features, suggestive of sessile serrated adenoma  - No evidence of malignancy.      D. Colon, Random Colon Cold Bx R/O Microscopic Colitis:  -Benign colonic mucosa with no significant pathological chnages  -No evidence of active inflammation  -No evidence of dysplasia or malignancy     E. Large Intestine, Transverse Colon, Transverse Colon Polyp, Cold Snare:  -Fragments of tubular adenoma.  -No evidence of high grade dysplasia or malignancy           Note       Interpretation performed at [facility], [address] [address]        Additional Information       All reported additional testing was performed with appropriately reactive controls.  These tests were developed and their performance characteristics determined by [facility] or appropriate performing facility, though some tests may be performed on tissues which have not been validated for performance characteristics (such as staining performed on alcohol exposed cell blocks and decalcified tissues).  Results should be interpreted with caution and in the context of the patients’ clinical condition. These tests may not be cleared or approved by the U.S. Food and Drug Administration, though the FDA has determined that such clearance or approval is not necessary. These tests are used for clinical purposes and they should not be regarded as investigational or for research. This laboratory has  "been approved by [facility], designated as a high-complexity laboratory and is qualified to perform these tests.  .      Synoptic Checklist          COLON/RECTUM POLYP FORM - GI - All Specimens          :    Adenoma(s)      Gross Description       A. The specimen is received in formalin, labeled with the patient's name and hospital number, and is designated \" duodenal biopsy rule out celiac\".  The specimen consists of multiple pale-tan soft tissue fragments measuring in aggregate 0.7 x 0.6 x 0.1 cm.  Entirely submitted. Screened cassette.  B. The specimen is received in formalin, labeled with the patient's name and hospital number, and is designated \" gastric biopsy rule out H. pylori\".  The specimen consists of multiple pale-tan to colorless friable appearing soft tissue fragments measuring in aggregate 0.7 x 0.7 x 0.1 cm.  Entirely submitted. Screened cassette.  C. The specimen is received in formalin, labeled with the patient's name and hospital number, and is designated \" cecal polyp\".  The specimen consists of multiple pale-tan soft tissue fragments measuring in aggregate 0.7 x 0.7 x 0.1 cm.  Entirely submitted. Screened cassette.  D. The specimen is received in formalin, labeled with the patient's name and hospital number, and is designated \" random colon biopsy rule out microscopic colitis\".  The specimen consists of multiple pale-tan soft tissue fragments measuring in aggregate 0.7 x 0.6 x 0.1 cm.  Entirely submitted. Screened cassette.  E. The specimen is received in formalin, labeled with the patient's name and hospital number, and is designated \" transverse colon polyp\".  The specimen consists of multiple pale-tan to yellow fragments of soft tissue versus fecal matter, measuring in aggregate 0.6 x 0.5 x 0.1 cm.  Entirely submitted. Screened cassette.    Note: The estimated total formalin fixation time based upon information provided by the submitting clinician and the standard processing schedule is under 72 " walter Presley

## 2025-04-08 NOTE — ASSESSMENT & PLAN NOTE
As above.  Orders:    CBC and differential; Future    Comprehensive metabolic panel; Future    C-reactive protein; Future    Magnesium    LD,Blood; Future    Iron Panel (Includes Ferritin, Iron Sat%, Iron, and TIBC); Future    Ferritin; Future    Haptoglobin; Future    Hemolysis Smear; Future    Erythropoietin    Direct antiglobulin test; Future    Sedimentation rate, automated; Future    Reticulocytes; Future    Vitamin B12; Future    IgG, IgA, IgM; Future    Immunoglobulin free LT chains blood; Future    Beta 2 microglobulin, serum; Future    Protein, Total and Protein Electrophoresis with Immunofixation; Future    Ambulatory referral to Interventional Radiology; Future

## 2025-04-09 ENCOUNTER — HOSPITAL ENCOUNTER (OUTPATIENT)
Dept: CT IMAGING | Facility: HOSPITAL | Age: 75
Discharge: HOME | End: 2025-04-09
Attending: INTERNAL MEDICINE
Payer: MEDICARE

## 2025-04-09 ENCOUNTER — TELEPHONE (OUTPATIENT)
Dept: HEMATOLOGY ONCOLOGY | Facility: CLINIC | Age: 75
End: 2025-04-09

## 2025-04-09 ENCOUNTER — HOSPITAL ENCOUNTER (EMERGENCY)
Facility: HOSPITAL | Age: 75
Discharge: HOME | End: 2025-04-09
Attending: EMERGENCY MEDICINE
Payer: MEDICARE

## 2025-04-09 VITALS
RESPIRATION RATE: 18 BRPM | DIASTOLIC BLOOD PRESSURE: 58 MMHG | TEMPERATURE: 98.2 F | OXYGEN SATURATION: 97 % | HEART RATE: 81 BPM | SYSTOLIC BLOOD PRESSURE: 120 MMHG

## 2025-04-09 DIAGNOSIS — R05.9 COUGH, UNSPECIFIED TYPE: ICD-10-CM

## 2025-04-09 DIAGNOSIS — D64.9 ANEMIA: Primary | ICD-10-CM

## 2025-04-09 DIAGNOSIS — D64.9 ACUTE ANEMIA: Primary | ICD-10-CM

## 2025-04-09 LAB
ABO GROUP BLD: NORMAL
ABO GROUP BLD: NORMAL
ALBUMIN SERPL BCG-MCNC: 3.4 G/DL (ref 3.5–5)
ALP SERPL-CCNC: 54 U/L (ref 34–104)
ALT SERPL W P-5'-P-CCNC: 6 U/L (ref 7–52)
ANION GAP SERPL CALCULATED.3IONS-SCNC: 7 MMOL/L (ref 4–13)
ANISOCYTOSIS BLD QL SMEAR: PRESENT
APTT PPP: 35 SECONDS (ref 23–34)
AST SERPL W P-5'-P-CCNC: 8 U/L (ref 13–39)
BASOPHILS # BLD MANUAL: 0 THOUSAND/UL (ref 0–0.1)
BASOPHILS NFR MAR MANUAL: 0 % (ref 0–1)
BILIRUB SERPL-MCNC: 2.3 MG/DL (ref 0.2–1)
BLD GP AB SCN SERPL QL: NEGATIVE
BLD SMEAR INTERP: NORMAL
BUN SERPL-MCNC: 12 MG/DL (ref 5–25)
CALCIUM ALBUM COR SERPL-MCNC: 9.3 MG/DL (ref 8.3–10.1)
CALCIUM SERPL-MCNC: 8.8 MG/DL (ref 8.4–10.2)
CHLORIDE SERPL-SCNC: 101 MMOL/L (ref 96–108)
CO2 SERPL-SCNC: 27 MMOL/L (ref 21–32)
CREAT SERPL-MCNC: 0.86 MG/DL (ref 0.6–1.3)
EOSINOPHIL # BLD MANUAL: 0.1 THOUSAND/UL (ref 0–0.4)
EOSINOPHIL NFR BLD MANUAL: 1 % (ref 0–6)
EPO SERPL-ACNC: 1011 MIU/ML (ref 2.6–18.5)
ERYTHROCYTE [DISTWIDTH] IN BLOOD BY AUTOMATED COUNT: 20.8 % (ref 11.6–15.1)
FERRITIN SERPL-MCNC: 397 NG/ML (ref 30–336)
GFR SERPL CREATININE-BSD FRML MDRD: 86 ML/MIN/1.73SQ M
GLUCOSE SERPL-MCNC: 130 MG/DL (ref 65–140)
HCT VFR BLD AUTO: 19.8 % (ref 36.5–49.3)
HCT VFR BLD AUTO: 20.7 % (ref 36.5–49.3)
HGB BLD-MCNC: 5.9 G/DL (ref 12–17)
HGB BLD-MCNC: 6.2 G/DL (ref 12–17)
INR PPP: 1.31 (ref 0.85–1.19)
KAPPA LC FREE SER-MCNC: 43.2 MG/L (ref 3.3–19.4)
KAPPA LC FREE/LAMBDA FREE SER: 1.18 {RATIO} (ref 0.26–1.65)
LAMBDA LC FREE SERPL-MCNC: 36.7 MG/L (ref 5.7–26.3)
LYMPHOCYTES # BLD AUTO: 1.06 THOUSAND/UL (ref 0.6–4.47)
LYMPHOCYTES # BLD AUTO: 11 % (ref 14–44)
MACROCYTES BLD QL AUTO: PRESENT
MCH RBC QN AUTO: 37.3 PG (ref 26.8–34.3)
MCHC RBC AUTO-ENTMCNC: 29.8 G/DL (ref 31.4–37.4)
MCV RBC AUTO: 125 FL (ref 82–98)
METAMYELOCYTE ABSOLUTE CT: 0.19 THOUSAND/UL (ref 0–0.1)
METAMYELOCYTES NFR BLD MANUAL: 2 % (ref 0–1)
MONOCYTES # BLD AUTO: 0.19 THOUSAND/UL (ref 0–1.22)
MONOCYTES NFR BLD: 2 % (ref 4–12)
MYELOCYTE ABSOLUTE CT: 0.1 THOUSAND/UL (ref 0–0.1)
MYELOCYTES NFR BLD MANUAL: 1 % (ref 0–1)
NEUTROPHILS # BLD MANUAL: 8.03 THOUSAND/UL (ref 1.85–7.62)
NEUTS BAND NFR BLD MANUAL: 4 % (ref 0–8)
NEUTS SEG NFR BLD AUTO: 79 % (ref 43–75)
PLATELET # BLD AUTO: 40 THOUSANDS/UL (ref 149–390)
PLATELET BLD QL SMEAR: ABNORMAL
PMV BLD AUTO: 12.6 FL (ref 8.9–12.7)
POIKILOCYTOSIS BLD QL SMEAR: PRESENT
POLYCHROMASIA BLD QL SMEAR: PRESENT
POTASSIUM SERPL-SCNC: 3.7 MMOL/L (ref 3.5–5.3)
PROT SERPL-MCNC: 6.3 G/DL (ref 6.4–8.4)
PROTHROMBIN TIME: 16.8 SECONDS (ref 12.3–15)
RBC # BLD AUTO: 1.58 MILLION/UL (ref 3.88–5.62)
RBC MORPH BLD: PRESENT
RH BLD: NEGATIVE
RH BLD: NEGATIVE
SODIUM SERPL-SCNC: 135 MMOL/L (ref 135–147)
SPECIMEN EXPIRATION DATE: NORMAL
VIT B12 SERPL-MCNC: 3721 PG/ML (ref 180–914)
WBC # BLD AUTO: 9.67 THOUSAND/UL (ref 4.31–10.16)

## 2025-04-09 PROCEDURE — P9016 RBC LEUKOCYTES REDUCED: HCPCS

## 2025-04-09 PROCEDURE — 85730 THROMBOPLASTIN TIME PARTIAL: CPT | Performed by: EMERGENCY MEDICINE

## 2025-04-09 PROCEDURE — 71260 CT THORAX DX C+: CPT

## 2025-04-09 PROCEDURE — 80053 COMPREHEN METABOLIC PANEL: CPT | Performed by: EMERGENCY MEDICINE

## 2025-04-09 PROCEDURE — 86850 RBC ANTIBODY SCREEN: CPT | Performed by: EMERGENCY MEDICINE

## 2025-04-09 PROCEDURE — 86920 COMPATIBILITY TEST SPIN: CPT

## 2025-04-09 PROCEDURE — 99283 EMERGENCY DEPT VISIT LOW MDM: CPT

## 2025-04-09 PROCEDURE — 86901 BLOOD TYPING SEROLOGIC RH(D): CPT | Performed by: EMERGENCY MEDICINE

## 2025-04-09 PROCEDURE — 86900 BLOOD TYPING SEROLOGIC ABO: CPT | Performed by: EMERGENCY MEDICINE

## 2025-04-09 PROCEDURE — 85027 COMPLETE CBC AUTOMATED: CPT | Performed by: EMERGENCY MEDICINE

## 2025-04-09 PROCEDURE — 99284 EMERGENCY DEPT VISIT MOD MDM: CPT | Performed by: EMERGENCY MEDICINE

## 2025-04-09 PROCEDURE — 85007 BL SMEAR W/DIFF WBC COUNT: CPT | Performed by: EMERGENCY MEDICINE

## 2025-04-09 PROCEDURE — 85610 PROTHROMBIN TIME: CPT | Performed by: EMERGENCY MEDICINE

## 2025-04-09 PROCEDURE — 85014 HEMATOCRIT: CPT | Performed by: EMERGENCY MEDICINE

## 2025-04-09 PROCEDURE — 36415 COLL VENOUS BLD VENIPUNCTURE: CPT | Performed by: EMERGENCY MEDICINE

## 2025-04-09 PROCEDURE — 85018 HEMOGLOBIN: CPT | Performed by: EMERGENCY MEDICINE

## 2025-04-09 PROCEDURE — 36430 TRANSFUSION BLD/BLD COMPNT: CPT

## 2025-04-09 RX ADMIN — IOHEXOL 100 ML: 350 INJECTION, SOLUTION INTRAVENOUS at 10:34

## 2025-04-09 NOTE — TELEPHONE ENCOUNTER
Provided w/ 2 servings of Cranberry Juice. Encouraging po fluids. DVC maintained for safety.   Reviewed labs with Dr. Woods.  Recommending blood transfusion today at ED.      Phoned patient.  Reviewed need for blood transfusion today at ED.  Patient agreeable.    Reviewed recommendations for blood work 2 x per week moving forward.  Patient will have completed on Monday and Thursdays at [facility].  Will transfuse as needed.

## 2025-04-09 NOTE — ED PROVIDER NOTES
Time reflects when diagnosis was documented in both MDM as applicable and the Disposition within this note       Time User Action Codes Description Comment    4/9/2025  3:43 PM Will Johnson Add [D64.9] Anemia           ED Disposition       ED Disposition   Discharge    Condition   Stable    Date/Time   Wed Apr 9, 2025  3:43 PM    Comment   Rikki Reyes discharge to home/self care.                   Assessment & Plan       Medical Decision Making  74-year-old male presenting for reports of low hemoglobin on outpatient labs.  Patient does report has been more tired and short of breath when he exerts himself.  He follows with hematology with a bone marrow aspiration planned in the next 1 to 2 months as they are unsure of what might be going on.  He has had multiple tests finding no obvious source of bleeding.  Repeat blood work here showing hemoglobin of 5.9.  Given unit of blood with improvement to 6.2 with some improvement of symptoms.  However as patient still low attempted to give another unit.  In long discussion with blood bank we did not have another unit here in the hospital available that was O-.  Would take multiple hours likely to get unit from another hospital.  Patient and patient's wife feel comfortable going home at this time and plans at [facility] [place] to get patient an outpatient transfusion of 1 unit of blood in 2 days at the infusion center.  Patient and wife are agreeable to this plan and would rather do this then wait here for multiple hours to get a blood transfusion.  Patient ambulatory out of the hospital without requiring any assistance.    Amount and/or Complexity of Data Reviewed  Labs: ordered.        ED Course as of 04/09/25 1828   Wed Apr 09, 2025   1356 Case discussed with on-call Heme and as patient appears well, transfuse and then hopefully get home as long as he tolerates it well and has follow up with them.    1431 Spoke with pathology and as patient O- they are requesting  administration of 1 uprbc and the re-evaluation prior to administration to second unit.    1756 In discussion with blood bank we have no further O- units available for transfusion and would need to get from another hospital which could take a few hours. Discussed with patient and wife at bedside and he does state he is feeling somewhat better than when he first got here. Reached out to infusion center and another option is to try and get him a unit as outpatient through them. Patient and wife and blood bank are all in agreement with this plan, will dc with strict return precuations.        Medications - No data to display    ED Risk Strat Scores                    No data recorded        SBIRT 20yo+      Flowsheet Row Most Recent Value   Initial Alcohol Screen: US AUDIT-C     1. How often do you have a drink containing alcohol? 0 Filed at: 04/09/2025 1352   2. How many drinks containing alcohol do you have on a typical day you are drinking?  0 Filed at: 04/09/2025 1352   3a. Male UNDER 65: How often do you have five or more drinks on one occasion? 0 Filed at: 04/09/2025 1352   3b. FEMALE Any Age, or MALE 65+: How often do you have 4 or more drinks on one occassion? 0 Filed at: 04/09/2025 1352   Audit-C Score 0 Filed at: 04/09/2025 1352   TODD: How many times in the past year have you...    Used an illegal drug or used a prescription medication for non-medical reasons? Never Filed at: 04/09/2025 1352                            History of Present Illness       Chief Complaint   Patient presents with    Abnormal Lab     HBG 6.0 on yesterday's bloodwork       Past Medical History:   Diagnosis Date    Coronary artery disease     Diabetes mellitus (HCC)     A1C normal.  Metformin discontinued    Hypertension       Past Surgical History:   Procedure Laterality Date    DENTAL IMPLANT      HEMANGIOMA EXCISION  1977    throat    HEMORROIDECTOMY        Family History   Problem Relation Age of Onset    Heart disease Father      Heart disease Brother       Social History     Tobacco Use    Smoking status: Former     Current packs/day: 0.00     Average packs/day: 1.5 packs/day for 20.0 years (30.0 ttl pk-yrs)     Types: Cigarettes     Start date: 1970     Quit date: 1990     Years since quittin.2    Smokeless tobacco: Never   Vaping Use    Vaping status: Never Used   Substance Use Topics    Alcohol use: Not Currently    Drug use: Never      E-Cigarette/Vaping    E-Cigarette Use Never User       E-Cigarette/Vaping Substances      I have reviewed and agree with the history as documented.     73 yo male presenting to the ed for reports of low hb as an outpatient. Does report increasing fatigue and HOUGH. No other symptoms or complaints.           Review of Systems   Respiratory:  Positive for shortness of breath.    Neurological:  Positive for weakness.           Objective       ED Triage Vitals   Temperature Pulse Blood Pressure Respirations SpO2 Patient Position - Orthostatic VS   25 1234 25 1234 25 1234 25 1234 25 1234 25 1234   98 °F (36.7 °C) 79 (!) 114/47 18 92 % Sitting      Temp Source Heart Rate Source BP Location FiO2 (%) Pain Score    25 1423 25 1234 25 1234 -- 25 1234    Temporal Monitor Left arm  No Pain      Vitals      Date and Time Temp Pulse SpO2 Resp BP Pain Score FACES Pain Rating User   25 1806 98.2 °F (36.8 °C) 81 97 % 18 120/58 -- -- SG   25 1640 98.5 °F (36.9 °C) 74 95 % 16 121/56 -- -- SG   25 1455 97.9 °F (36.6 °C) 72 97 % 18 117/54 -- -- SG   25 1445 98 °F (36.7 °C) 71 95 % 18 123/60 -- -- SG   25 1423 98.2 °F (36.8 °C) 73 100 % 18 115/57 -- -- SG   25 1234 98 °F (36.7 °C) 79 92 % 18 114/47 No Pain -- SG            Physical Exam  Vitals and nursing note reviewed.   Constitutional:       General: He is not in acute distress.     Appearance: He is well-developed. He is not ill-appearing, toxic-appearing or  diaphoretic.   HENT:      Head: Normocephalic and atraumatic.      Right Ear: External ear normal.      Left Ear: External ear normal.      Nose: Nose normal.   Eyes:      General: No scleral icterus.        Right eye: No discharge.         Left eye: No discharge.      Conjunctiva/sclera: Conjunctivae normal.   Cardiovascular:      Rate and Rhythm: Normal rate and regular rhythm.      Heart sounds: Normal heart sounds. No murmur heard.     No friction rub. No gallop.   Pulmonary:      Effort: Pulmonary effort is normal. No respiratory distress.      Breath sounds: Normal breath sounds. No wheezing or rales.   Abdominal:      General: Bowel sounds are normal. There is no distension.      Palpations: Abdomen is soft. There is no mass.      Tenderness: There is no abdominal tenderness. There is no guarding.   Musculoskeletal:         General: No tenderness or deformity. Normal range of motion.      Cervical back: Normal range of motion and neck supple.   Skin:     General: Skin is warm and dry.      Coloration: Skin is pale.      Findings: No erythema or rash.   Neurological:      Mental Status: He is alert and oriented to person, place, and time.   Psychiatric:         Behavior: Behavior normal.         Thought Content: Thought content normal.         Judgment: Judgment normal.         Results Reviewed       Procedure Component Value Units Date/Time    Hemoglobin and hematocrit, blood [[id number]]  (Abnormal) Collected: 04/09/25 1646    Lab Status: Final result Specimen: Blood from Arm, Right Updated: 04/09/25 1654     Hemoglobin 6.2 g/dL      Hematocrit 20.7 %     RBC Morphology Reflex Test [[id number]] Collected: 04/09/25 1252    Lab Status: Final result Specimen: Blood from Arm, Right Updated: 04/09/25 1401    CBC and differential [[id number]]  (Abnormal) Collected: 04/09/25 1252    Lab Status: Final result Specimen: Blood from Arm, Right Updated: 04/09/25 1339     WBC 9.67 Thousand/uL      RBC 1.58 Million/uL       Hemoglobin 5.9 g/dL      Hematocrit 19.8 %       fL      MCH 37.3 pg      MCHC 29.8 g/dL      RDW 20.8 %      MPV 12.6 fL      Platelets 40 Thousands/uL     Manual Differential(PHLEBS Do Not Order) [[id number]]  (Abnormal) Collected: 04/09/25 1252    Lab Status: Final result Specimen: Blood from Arm, Right Updated: 04/09/25 1337     Segmented % 79 %      Bands % 4 %      Lymphocytes % 11 %      Monocytes % 2 %      Eosinophils % 1 %      Basophils % 0 %      Metamyelocytes % 2 %      Myelocytes % 1 %      Absolute Neutrophils 8.03 Thousand/uL      Absolute Lymphocytes 1.06 Thousand/uL      Absolute Monocytes 0.19 Thousand/uL      Absolute Eosinophils 0.10 Thousand/uL      Absolute Basophils 0.00 Thousand/uL      Absolute Metamyelocytes 0.19 Thousand/uL      Absolute Myelocytes 0.10 Thousand/uL      Total Counted --     RBC Morphology Present     Platelet Estimate Decreased     Anisocytosis Present     Macrocytes Present     Poikilocytes Present     Polychromasia Present    Comprehensive metabolic panel [[id number]]  (Abnormal) Collected: 04/09/25 1252    Lab Status: Final result Specimen: Blood from Arm, Right Updated: 04/09/25 1316     Sodium 135 mmol/L      Potassium 3.7 mmol/L      Chloride 101 mmol/L      CO2 27 mmol/L      ANION GAP 7 mmol/L      BUN 12 mg/dL      Creatinine 0.86 mg/dL      Glucose 130 mg/dL      Calcium 8.8 mg/dL      Corrected Calcium 9.3 mg/dL      AST 8 U/L      ALT 6 U/L      Alkaline Phosphatase 54 U/L      Total Protein 6.3 g/dL      Albumin 3.4 g/dL      Total Bilirubin 2.30 mg/dL      eGFR 86 ml/min/1.73sq m     Narrative:      National Kid[employer] Foundation guidelines for Chronic Kidney Disease (CKD):     Stage 1 with normal or high GFR (GFR > 90 mL/min/1.73 square meters)    Stage 2 Mild CKD (GFR = 60-89 mL/min/1.73 square meters)    Stage 3A Moderate CKD (GFR = 45-59 mL/min/1.73 square meters)    Stage 3B Moderate CKD (GFR = 30-44 mL/min/1.73 square meters)    Stage 4  Severe CKD (GFR = 15-29 mL/min/1.73 square meters)    Stage 5 End Stage CKD (GFR <15 mL/min/1.73 square meters)  Note: GFR calculation is accurate only with a steady state creatinine    Protime-INR [[id number]]  (Abnormal) Collected: 04/09/25 1252    Lab Status: Final result Specimen: Blood from Arm, Right Updated: 04/09/25 1313     Protime 16.8 seconds      INR 1.31    Narrative:      INR Therapeutic Range    Indication                                             INR Range      Atrial Fibrillation                                               2.0-3.0  Hypercoagulable State                                    2.0.2.3  Left Ventricular Asist Device                            2.0-3.0  Mechanical Heart Valve                                  -    Aortic(with afib, MI, embolism, HF, LA enlargement,    and/or coagulopathy)                                     2.0-3.0 (2.5-3.5)     Mitral                                                             2.5-3.5  Prosthetic/Bioprosthetic Heart Valve               2.0-3.0  Venous thromboembolism (VTE: VT, PE        2.0-3.0    APTT [[id number]]  (Abnormal) Collected: 04/09/25 1252    Lab Status: Final result Specimen: Blood from Arm, Right Updated: 04/09/25 1313     PTT 35 seconds             No orders to display       Procedures    ED Medication and Procedure Management   Prior to Admission Medications   Prescriptions Last Dose Informant Patient Reported? Taking?   Aspirin 81 MG CAPS  Self Yes No   Sig: Take 1 capsule by mouth in the morning   Cholecalciferol (D3-1000) 25 MCG (1000 UT) capsule  Self Yes No   Sig: Take 1,000 Units by mouth daily   Cyanocobalamin (B-12) 1000 MCG TABS  Self Yes No   Sig: Place 1 tablet under the tongue in the morning   Patient not taking: Reported on 4/8/2025   Rosuvastatin Calcium 20 MG CPSP  Self No No   Sig: Take 1 capsule by mouth in the morning   carvedilol (COREG) 3.125 mg tablet  Self No No   Sig: Take 1 tablet (3.125 mg total) by mouth 2 (two)  times a day with meals   omeprazole (PriLOSEC) 40 MG capsule  Self No No   Sig: Take 1 capsule (40 mg total) by mouth daily   ondansetron (ZOFRAN) 4 mg tablet  Self No No   Sig: Take 1 tablet (4 mg total) by mouth every 8 (eight) hours as needed for nausea or vomiting   tamsulosin (FLOMAX) 0.4 mg  Self Yes No      Facility-Administered Medications: None     Discharge Medication List as of 4/9/2025  6:04 PM        CONTINUE these medications which have NOT CHANGED    Details   Aspirin 81 MG CAPS Take 1 capsule by mouth in the morning, Historical Med      carvedilol (COREG) 3.125 mg tablet Take 1 tablet (3.125 mg total) by mouth 2 (two) times a day with meals, Starting Mon 1/20/2025, Normal      Cholecalciferol (D3-1000) 25 MCG (1000 UT) capsule Take 1,000 Units by mouth daily, Historical Med      Cyanocobalamin (B-12) 1000 MCG TABS Place 1 tablet under the tongue in the morning, Historical Med      omeprazole (PriLOSEC) 40 MG capsule Take 1 capsule (40 mg total) by mouth daily, Starting Fri 3/21/2025, Normal      ondansetron (ZOFRAN) 4 mg tablet Take 1 tablet (4 mg total) by mouth every 8 (eight) hours as needed for nausea or vomiting, Starting Thu 3/27/2025, Normal      Rosuvastatin Calcium 20 MG CPSP Take 1 capsule by mouth in the morning, Starting Mon 1/20/2025, Normal      tamsulosin (FLOMAX) 0.4 mg Historical Med           No discharge procedures on file.  ED SEPSIS DOCUMENTATION   Time reflects when diagnosis was documented in both MDM as applicable and the Disposition within this note       Time User Action Codes Description Comment    4/9/2025  3:43 PM Will Johnson Add [D64.9] Bryan Johnson,   04/09/25 1828

## 2025-04-10 LAB
ABO GROUP BLD BPU: NORMAL
BPU ID: NORMAL
CROSSMATCH: NORMAL
HAPTOGLOB SERPL-MCNC: 229 MG/DL (ref 34–355)
UNIT DISPENSE STATUS: NORMAL
UNIT PRODUCT CODE: NORMAL
UNIT PRODUCT VOLUME: 350 ML
UNIT RH: NORMAL

## 2025-04-10 RX ORDER — SODIUM CHLORIDE 9 MG/ML
20 INJECTION, SOLUTION INTRAVENOUS ONCE
OUTPATIENT
Start: 2025-04-11

## 2025-04-10 RX ORDER — SODIUM CHLORIDE 9 MG/ML
20 INJECTION, SOLUTION INTRAVENOUS ONCE
Status: CANCELLED | OUTPATIENT
Start: 2025-04-11

## 2025-04-11 ENCOUNTER — HOSPITAL ENCOUNTER (OUTPATIENT)
Dept: INFUSION CENTER | Facility: HOSPITAL | Age: 75
End: 2025-04-11
Payer: MEDICARE

## 2025-04-11 VITALS
OXYGEN SATURATION: 98 % | DIASTOLIC BLOOD PRESSURE: 49 MMHG | HEART RATE: 66 BPM | SYSTOLIC BLOOD PRESSURE: 94 MMHG | TEMPERATURE: 97.9 F | RESPIRATION RATE: 16 BRPM

## 2025-04-11 DIAGNOSIS — D64.9 ACUTE ANEMIA: Primary | ICD-10-CM

## 2025-04-11 LAB
ALBUMIN SERPL ELPH-MCNC: 2.99 G/DL (ref 3.2–5.1)
ALBUMIN SERPL ELPH-MCNC: 49.8 % (ref 48–70)
ALPHA1 GLOB SERPL ELPH-MCNC: 0.61 G/DL (ref 0.15–0.47)
ALPHA1 GLOB SERPL ELPH-MCNC: 10.1 % (ref 1.8–7)
ALPHA2 GLOB SERPL ELPH-MCNC: 0.82 G/DL (ref 0.42–1.04)
ALPHA2 GLOB SERPL ELPH-MCNC: 13.6 % (ref 5.9–14.9)
B2 MICROGLOB SERPL-MCNC: 2.9 MG/L (ref 0.6–2.4)
BETA GLOB ABNORMAL SERPL ELPH-MCNC: 0.35 G/DL (ref 0.31–0.57)
BETA1 GLOB SERPL ELPH-MCNC: 5.9 % (ref 4.7–7.7)
BETA2 GLOB SERPL ELPH-MCNC: 6 % (ref 3.1–7.9)
BETA2+GAMMA GLOB SERPL ELPH-MCNC: 0.36 G/DL (ref 0.2–0.58)
GAMMA GLOB ABNORMAL SERPL ELPH-MCNC: 0.88 G/DL (ref 0.4–1.66)
GAMMA GLOB SERPL ELPH-MCNC: 14.6 % (ref 6.9–22.3)
IGG/ALB SER: 0.99 {RATIO} (ref 1.1–1.8)
PROT SERPL-MCNC: 6 G/DL (ref 6.4–8.4)

## 2025-04-11 PROCEDURE — P9040 RBC LEUKOREDUCED IRRADIATED: HCPCS

## 2025-04-11 PROCEDURE — 84165 PROTEIN E-PHORESIS SERUM: CPT | Performed by: STUDENT IN AN ORGANIZED HEALTH CARE EDUCATION/TRAINING PROGRAM

## 2025-04-11 PROCEDURE — 36430 TRANSFUSION BLD/BLD COMPNT: CPT

## 2025-04-11 PROCEDURE — 86334 IMMUNOFIX E-PHORESIS SERUM: CPT | Performed by: STUDENT IN AN ORGANIZED HEALTH CARE EDUCATION/TRAINING PROGRAM

## 2025-04-11 RX ORDER — SODIUM CHLORIDE 9 MG/ML
20 INJECTION, SOLUTION INTRAVENOUS ONCE
Status: COMPLETED | OUTPATIENT
Start: 2025-04-11 | End: 2025-04-11

## 2025-04-11 RX ADMIN — SODIUM CHLORIDE 20 ML/HR: 0.9 INJECTION, SOLUTION INTRAVENOUS at 12:10

## 2025-04-11 NOTE — PROGRESS NOTES
Rikki Reyes  tolerated blood transfusion well with no complications.      Rikki Reyes is aware of no future infusion appointments at this time.     AVS printed and given to Rikki Reyes: No (Declined by Rikki Reyes).

## 2025-04-12 LAB
ABO GROUP BLD BPU: NORMAL
BPU ID: NORMAL
CROSSMATCH: NORMAL
UNIT DISPENSE STATUS: NORMAL
UNIT PRODUCT CODE: NORMAL
UNIT PRODUCT VOLUME: 350 ML
UNIT RH: NORMAL

## 2025-04-14 ENCOUNTER — APPOINTMENT (OUTPATIENT)
Dept: LAB | Facility: HOSPITAL | Age: 75
End: 2025-04-14
Payer: MEDICARE

## 2025-04-14 ENCOUNTER — OFFICE VISIT (OUTPATIENT)
Dept: GASTROENTEROLOGY | Facility: CLINIC | Age: 75
End: 2025-04-14
Payer: MEDICARE

## 2025-04-14 VITALS
HEIGHT: 71 IN | WEIGHT: 212 LBS | SYSTOLIC BLOOD PRESSURE: 116 MMHG | BODY MASS INDEX: 29.68 KG/M2 | TEMPERATURE: 97.9 F | DIASTOLIC BLOOD PRESSURE: 66 MMHG | HEART RATE: 76 BPM | OXYGEN SATURATION: 94 %

## 2025-04-14 DIAGNOSIS — D64.9 ANEMIA, UNSPECIFIED TYPE: ICD-10-CM

## 2025-04-14 DIAGNOSIS — R68.81 EARLY SATIETY: ICD-10-CM

## 2025-04-14 DIAGNOSIS — K29.80 EOSINOPHILIC DUODENITIS: Primary | ICD-10-CM

## 2025-04-14 DIAGNOSIS — D72.18 EOSINOPHILIC DUODENITIS: Primary | ICD-10-CM

## 2025-04-14 DIAGNOSIS — R63.4 WEIGHT LOSS, ABNORMAL: ICD-10-CM

## 2025-04-14 DIAGNOSIS — E80.4 GILBERT SYNDROME: ICD-10-CM

## 2025-04-14 DIAGNOSIS — Z86.0100 HISTORY OF COLON POLYPS: ICD-10-CM

## 2025-04-14 DIAGNOSIS — D64.9 ACUTE ANEMIA: ICD-10-CM

## 2025-04-14 DIAGNOSIS — D69.6 THROMBOCYTOPENIA (HCC): ICD-10-CM

## 2025-04-14 DIAGNOSIS — R63.0 LACK OF APPETITE: ICD-10-CM

## 2025-04-14 PROBLEM — E88.89 STEATOSIS (HCC): Status: RESOLVED | Noted: 2024-09-27 | Resolved: 2025-04-14

## 2025-04-14 PROBLEM — K76.6 PORTAL HYPERTENSIVE GASTROPATHY  (HCC): Status: RESOLVED | Noted: 2025-02-17 | Resolved: 2025-04-14

## 2025-04-14 PROBLEM — K31.89 PORTAL HYPERTENSIVE GASTROPATHY  (HCC): Status: RESOLVED | Noted: 2025-02-17 | Resolved: 2025-04-14

## 2025-04-14 LAB
ANISOCYTOSIS BLD QL SMEAR: PRESENT
BASOPHILS # BLD MANUAL: 0 THOUSAND/UL (ref 0–0.1)
BASOPHILS NFR MAR MANUAL: 0 % (ref 0–1)
EOSINOPHIL # BLD MANUAL: 0.32 THOUSAND/UL (ref 0–0.4)
EOSINOPHIL NFR BLD MANUAL: 3 % (ref 0–6)
ERYTHROCYTE [DISTWIDTH] IN BLOOD BY AUTOMATED COUNT: 23.9 % (ref 11.6–15.1)
HCT VFR BLD AUTO: 25 % (ref 36.5–49.3)
HGB BLD-MCNC: 7.5 G/DL (ref 12–17)
LYMPHOCYTES # BLD AUTO: 1.29 THOUSAND/UL (ref 0.6–4.47)
LYMPHOCYTES # BLD AUTO: 12 % (ref 14–44)
MACROCYTES BLD QL AUTO: PRESENT
MCH RBC QN AUTO: 35.5 PG (ref 26.8–34.3)
MCHC RBC AUTO-ENTMCNC: 30 G/DL (ref 31.4–37.4)
MCV RBC AUTO: 119 FL (ref 82–98)
MONOCYTES # BLD AUTO: 0.11 THOUSAND/UL (ref 0–1.22)
MONOCYTES NFR BLD: 1 % (ref 4–12)
MYELOCYTE ABSOLUTE CT: 0.22 THOUSAND/UL (ref 0–0.1)
MYELOCYTES NFR BLD MANUAL: 2 % (ref 0–1)
NEUTROPHILS # BLD MANUAL: 8.82 THOUSAND/UL (ref 1.85–7.62)
NEUTS BAND NFR BLD MANUAL: 6 % (ref 0–8)
NEUTS SEG NFR BLD AUTO: 76 % (ref 43–75)
PLATELET # BLD AUTO: 34 THOUSANDS/UL (ref 149–390)
PLATELET BLD QL SMEAR: ABNORMAL
PMV BLD AUTO: 11.9 FL (ref 8.9–12.7)
POIKILOCYTOSIS BLD QL SMEAR: PRESENT
POLYCHROMASIA BLD QL SMEAR: PRESENT
RBC # BLD AUTO: 2.11 MILLION/UL (ref 3.88–5.62)
RBC MORPH BLD: PRESENT
WBC # BLD AUTO: 10.75 THOUSAND/UL (ref 4.31–10.16)

## 2025-04-14 PROCEDURE — 85007 BL SMEAR W/DIFF WBC COUNT: CPT

## 2025-04-14 PROCEDURE — G2211 COMPLEX E/M VISIT ADD ON: HCPCS | Performed by: STUDENT IN AN ORGANIZED HEALTH CARE EDUCATION/TRAINING PROGRAM

## 2025-04-14 PROCEDURE — 85027 COMPLETE CBC AUTOMATED: CPT

## 2025-04-14 PROCEDURE — 36415 COLL VENOUS BLD VENIPUNCTURE: CPT

## 2025-04-14 PROCEDURE — 99214 OFFICE O/P EST MOD 30 MIN: CPT | Performed by: STUDENT IN AN ORGANIZED HEALTH CARE EDUCATION/TRAINING PROGRAM

## 2025-04-14 RX ORDER — OMEPRAZOLE 40 MG/1
40 CAPSULE, DELAYED RELEASE ORAL DAILY
Qty: 30 CAPSULE | Refills: 3 | Status: SHIPPED | OUTPATIENT
Start: 2025-04-14

## 2025-04-14 RX ORDER — PREDNISONE 10 MG/1
TABLET ORAL
Qty: 25 TABLET | Refills: 0 | Status: SHIPPED | OUTPATIENT
Start: 2025-04-14 | End: 2025-05-05

## 2025-04-14 NOTE — ASSESSMENT & PLAN NOTE
Increased eosinophils 35-40 per high-powered field on recent biopsies of the duodenum.  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  This could result in some GI symptoms although I do not expect that this would be enough to explain the constellation of his symptoms.  I also do not believe that this would be related to his anemia.  Low suspicion for parasitic infection.    Recommend trial of prednisone given findings suspicious for eosinophilic duodenitis  Recommend short taper with 20 mg time 7 days, 10 mg x 7 days, 5 mg x 7 days and then stop    Orders:    predniSONE 10 mg tablet; Take 2 tablets (20 mg total) by mouth daily for 7 days, THEN 1 tablet (10 mg total) daily for 7 days, THEN 0.5 tablets (5 mg total) daily for 7 days.

## 2025-04-14 NOTE — PROGRESS NOTES
Spoke with patient to go over the pre-procedure instructions for his bone marrow biopsy on 4/21/25 at the [facility].

## 2025-04-14 NOTE — ASSESSMENT & PLAN NOTE
BMI is 29.57 kg/m².  Weighs 212 pounds today in the office.  Has lost approximately 3 pounds since his last office visit in March.  Likely secondary to decreased appetite although the cause for that remains unknown.    Monitor weight trends  Consider appetite stimulant  Consider nutritional supplementation with protein shakes    Orders:    predniSONE 10 mg tablet; Take 2 tablets (20 mg total) by mouth daily for 7 days, THEN 1 tablet (10 mg total) daily for 7 days, THEN 0.5 tablets (5 mg total) daily for 7 days.    omeprazole (PriLOSEC) 40 MG capsule; Take 1 capsule (40 mg total) by mouth daily

## 2025-04-14 NOTE — ASSESSMENT & PLAN NOTE
May be multifactorial.  There does not appear to be any significant organic cause for this seen on recent bidirectional endoscopy.  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  Could have underlying functional component.    Management as noted above  Consider trial of Remeron or other appetite stimulant if he continues to lose weight and appetite remains poor  Consider nutritional supplementation with protein shakes    Orders:    predniSONE 10 mg tablet; Take 2 tablets (20 mg total) by mouth daily for 7 days, THEN 1 tablet (10 mg total) daily for 7 days, THEN 0.5 tablets (5 mg total) daily for 7 days.

## 2025-04-14 NOTE — ASSESSMENT & PLAN NOTE
2 subcentimeter polyps removed during recent colonoscopy.    Next colonoscopy due in 7 years although can consider deferring depending on patient's overall health at that time

## 2025-04-14 NOTE — ASSESSMENT & PLAN NOTE
Macrocytic anemia noted.  Thrombocytopenia also noted.  Possibly related to the splenomegaly and due to underlying hematologic process.  No overt signs of bleeding.  GI findings as noted.    Follow-up with hematology  Agree with bone marrow biopsy

## 2025-04-14 NOTE — ASSESSMENT & PLAN NOTE
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  Considered benign.  He did have findings of possible cirrhosis on cross-sectional imaging although elastography is negative for any evidence of fibrosis.  He does have splenomegaly although I suspect that this is due to underlying hematologic process.  Possible indicator of hemolysis?  Low suspicion for portal hypertension.    No further workup needed  Follow-up with hematology

## 2025-04-14 NOTE — PROGRESS NOTES
Name: Rikki Reyes      : 1950      MRN: [id number]  Encounter Provider: Shubham Nicole DO  Encounter Date: 2025   Encounter department: [facility] [place]    :  Assessment & Plan  Eosinophilic duodenitis  Increased eosinophils 35-40 per high-powered field on recent biopsies of the duodenum.  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  This could result in some GI symptoms although I do not expect that this would be enough to explain the constellation of his symptoms.  I also do not believe that this would be related to his anemia.  Low suspicion for parasitic infection.    Recommend trial of prednisone given findings suspicious for eosinophilic duodenitis  Recommend short taper with 20 mg time 7 days, 10 mg x 7 days, 5 mg x 7 days and then stop    Orders:    predniSONE 10 mg tablet; Take 2 tablets (20 mg total) by mouth daily for 7 days, THEN 1 tablet (10 mg total) daily for 7 days, THEN 0.5 tablets (5 mg total) daily for 7 days.    Weight loss, abnormal  BMI is 29.57 kg/m².  Weighs 212 pounds today in the office.  Has lost approximately 3 pounds since his last office visit in March.  Likely secondary to decreased appetite although the cause for that remains unknown.    Monitor weight trends  Consider appetite stimulant  Consider nutritional supplementation with protein shakes    Orders:    predniSONE 10 mg tablet; Take 2 tablets (20 mg total) by mouth daily for 7 days, THEN 1 tablet (10 mg total) daily for 7 days, THEN 0.5 tablets (5 mg total) daily for 7 days.    omeprazole (PriLOSEC) 40 MG capsule; Take 1 capsule (40 mg total) by mouth daily    Lack of appetite  May be multifactorial.  There does not appear to be any significant organic cause for this seen on recent bidirectional endoscopy.  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  Could have underlying functional  component.    Management as noted above  Consider trial of Remeron or other appetite stimulant if he continues to lose weight and appetite remains poor  Consider nutritional supplementation with protein shakes    Orders:    predniSONE 10 mg tablet; Take 2 tablets (20 mg total) by mouth daily for 7 days, THEN 1 tablet (10 mg total) daily for 7 days, THEN 0.5 tablets (5 mg total) daily for 7 days.    Anemia, unspecified type  Macrocytic anemia noted.  Thrombocytopenia also noted.  Possibly related to the splenomegaly and due to underlying hematologic process.  No overt signs of bleeding.  GI findings as noted.    Follow-up with hematology  Agree with bone marrow biopsy         History of colon polyps  2 subcentimeter polyps removed during recent colonoscopy.    Next colonoscopy due in 7 years although can consider deferring depending on patient's overall health at that time       Gilbert syndrome  History of Gilbert's syndrome (primarily indirect fractionation noted on previous blood work).  Considered benign.  He did have findings of possible cirrhosis on cross-sectional imaging although elastography is negative for any evidence of fibrosis.  He does have splenomegaly although I suspect that this is due to underlying hematologic process.  Possible indicator of hemolysis?  Low suspicion for portal hypertension.    No further workup needed  Follow-up with hematology         Early satiety    Orders:    omeprazole (PriLOSEC) 40 MG capsule; Take 1 capsule (40 mg total) by mouth daily          History of Present Illness  The patient is a 74-year-old male here for follow-up of weight loss and history of colon polyps.    He has been doing fairly stable since his last office visit and since his procedures. He has lost approximately 3 more pounds since his last office visit. He does admit that his appetite is fairly poor and this has been ongoing since January when all of this started, he believes that this is contributing to  his weight loss. He does take fiber pills daily and does have some mild constipation. He offers no other acute GI complaints today. He reports no early satiety. He does not report any overt signs of bleeding, including hematochezia or melena. His upper endoscopy was fairly unremarkable, showing evidence of increased eosinophils in his duodenum but no evidence of celiac disease.    His colonoscopy was notable for 2 subcentimeter polyps which were removed as well as diverticulosis.    He was found to have anemia, for which he is following with Hematology. He has a bone marrow biopsy scheduled for next week.  He has required blood transfusions.  He also has thrombocytopenia.    History obtained from: patient    Review of Systems   Constitutional:  Positive for unexpected weight change. Negative for activity change.        Poor appetite   HENT:  Negative for trouble swallowing.    Respiratory:  Negative for shortness of breath.    Cardiovascular:  Negative for chest pain.   Gastrointestinal:  Positive for constipation. Negative for abdominal distention, abdominal pain, blood in stool, diarrhea, nausea, rectal pain and vomiting.   Skin:  Negative for color change, rash and wound.     Medical History Reviewed by provider this encounter:     .  Current Outpatient Medications on File Prior to Visit   Medication Sig Dispense Refill    Aspirin 81 MG CAPS Take 1 capsule by mouth in the morning      carvedilol (COREG) 3.125 mg tablet Take 1 tablet (3.125 mg total) by mouth 2 (two) times a day with meals 180 tablet 3    Cholecalciferol (D3-1000) 25 MCG (1000 UT) capsule Take 1,000 Units by mouth daily      ondansetron (ZOFRAN) 4 mg tablet Take 1 tablet (4 mg total) by mouth every 8 (eight) hours as needed for nausea or vomiting 45 tablet 0    Rosuvastatin Calcium 20 MG CPSP Take 1 capsule by mouth in the morning 90 capsule 3    tamsulosin (FLOMAX) 0.4 mg       [DISCONTINUED] omeprazole (PriLOSEC) 40 MG capsule Take 1 capsule (40  "mg total) by mouth daily 30 capsule 3    Cyanocobalamin (B-12) 1000 MCG TABS Place 1 tablet under the tongue in the morning (Patient not taking: Reported on 2025)       Current Facility-Administered Medications on File Prior to Visit   Medication Dose Route Frequency Provider Last Rate Last Admin    alteplase (CATHFLO) injection 2 mg  2 mg Intracatheter Q1MIN PRN Navarro Woods MD          Social History     Tobacco Use    Smoking status: Former     Current packs/day: 0.00     Average packs/day: 1.5 packs/day for 20.0 years (30.0 ttl pk-yrs)     Types: Cigarettes     Start date: 1970     Quit date: 1990     Years since quittin.3    Smokeless tobacco: Never   Vaping Use    Vaping status: Never Used   Substance and Sexual Activity    Alcohol use: Not Currently    Drug use: Never    Sexual activity: Not Currently     Partners: Female        Objective   /66 (BP Location: Right arm, Patient Position: Sitting, Cuff Size: Standard)   Pulse 76   Temp 97.9 °F (36.6 °C) (Temporal)   Ht 5' 11\" (1.803 m)   Wt 96.2 kg (212 lb)   SpO2 94%   BMI 29.57 kg/m²      Physical Exam  The patient appears appropriate for age with somewhat generalized muscular atrophy.  Lung sounds are clear bilaterally. No respiratory distress.  Heart rate is normal. Rhythm is regular.  Abdomen is soft, nontender, nondistended with audible bowel sounds.  No appreciable lower extremity edema.       Results  Laboratory Studies  Upper endoscopy showed evidence of increased eosinophils in the duodenum. No evidence of celiac disease.    Results for orders placed during the hospital encounter of 25    EGD    Impression  Mild erythematous mucosa in the body of the stomach, antrum, prepyloric region and pylorus, consistent with gastritis; performed cold forceps biopsy to rule out H. pylori  Moderate erythematous mucosa with blunted villi in the duodenal bulb, 1st part of the duodenum and 2nd part of the duodenum; performed cold " forceps biopsy to rule out celiac disease  The upper third of the esophagus, middle third of the esophagus and lower third of the esophagus appeared normal.  The cardia and fundus of the stomach appeared normal.  The major papilla appeared normal.      RECOMMENDATION:  Await pathology results  Proceed with colonoscopy  Follow up with GI Clinic  Continue omeprazole 40 mg daily            No Staff Documented      Administrative Statements   I have spent a total time of 20 minutes in caring for this patient on the day of the visit/encounter including Diagnostic results, Prognosis, Risks and benefits of tx options, Instructions for management, Patient and family education, Importance of tx compliance, Risk factor reductions, Impressions, Documenting in the medical record, Reviewing/placing orders in the medical record (including tests, medications, and/or procedures), and Obtaining or reviewing history  .      Shubham Nicole D.O.  [facility]  Division of Gastroenterology & Hepatology  Available on [employer]  [email or web address]    ** Please Note: This note is constructed using artificial intelligence and a voice recognition dictation system. **

## 2025-04-17 ENCOUNTER — APPOINTMENT (OUTPATIENT)
Dept: LAB | Facility: HOSPITAL | Age: 75
End: 2025-04-17
Payer: MEDICARE

## 2025-04-17 DIAGNOSIS — D69.6 THROMBOCYTOPENIA (HCC): ICD-10-CM

## 2025-04-17 DIAGNOSIS — D64.9 ACUTE ANEMIA: ICD-10-CM

## 2025-04-17 LAB
ANISOCYTOSIS BLD QL SMEAR: PRESENT
BASOPHILS # BLD MANUAL: 0 THOUSAND/UL (ref 0–0.1)
BASOPHILS NFR MAR MANUAL: 0 % (ref 0–1)
BLASTS ABSOLUTE COUNT: 0.07 THOUSAND/UL (ref 0–0)
BLASTS NFR BLD MANUAL: 1 %
EOSINOPHIL # BLD MANUAL: 0.2 THOUSAND/UL (ref 0–0.4)
EOSINOPHIL NFR BLD MANUAL: 3 % (ref 0–6)
ERYTHROCYTE [DISTWIDTH] IN BLOOD BY AUTOMATED COUNT: 23.5 % (ref 11.6–15.1)
HCT VFR BLD AUTO: 25.3 % (ref 36.5–49.3)
HGB BLD-MCNC: 7.4 G/DL (ref 12–17)
LYMPHOCYTES # BLD AUTO: 2.04 THOUSAND/UL (ref 0.6–4.47)
LYMPHOCYTES # BLD AUTO: 30 % (ref 14–44)
MACROCYTES BLD QL AUTO: PRESENT
MCH RBC QN AUTO: 35.2 PG (ref 26.8–34.3)
MCHC RBC AUTO-ENTMCNC: 29.2 G/DL (ref 31.4–37.4)
MCV RBC AUTO: 121 FL (ref 82–98)
METAMYELOCYTE ABSOLUTE CT: 0.2 THOUSAND/UL (ref 0–0.1)
METAMYELOCYTES NFR BLD MANUAL: 3 % (ref 0–1)
MONOCYTES # BLD AUTO: 0.07 THOUSAND/UL (ref 0–1.22)
MONOCYTES NFR BLD: 1 % (ref 4–12)
MYELOCYTE ABSOLUTE CT: 0.13 THOUSAND/UL (ref 0–0.1)
MYELOCYTES NFR BLD MANUAL: 2 % (ref 0–1)
NEUTROPHILS # BLD MANUAL: 3.89 THOUSAND/UL (ref 1.85–7.62)
NEUTS BAND NFR BLD MANUAL: 1 % (ref 0–8)
NEUTS SEG NFR BLD AUTO: 58 % (ref 43–75)
NRBC BLD AUTO-RTO: 1 /100 WBC (ref 0–2)
PLASMA CELLS NFR BLD: 1 % (ref 0–0)
PLATELET # BLD AUTO: 46 THOUSANDS/UL (ref 149–390)
PLATELET BLD QL SMEAR: ABNORMAL
PMV BLD AUTO: 12.6 FL (ref 8.9–12.7)
POIKILOCYTOSIS BLD QL SMEAR: PRESENT
POLYCHROMASIA BLD QL SMEAR: PRESENT
RBC # BLD AUTO: 2.1 MILLION/UL (ref 3.88–5.62)
RBC MORPH BLD: PRESENT
WBC # BLD AUTO: 6.59 THOUSAND/UL (ref 4.31–10.16)

## 2025-04-17 PROCEDURE — 85027 COMPLETE CBC AUTOMATED: CPT

## 2025-04-17 PROCEDURE — 85007 BL SMEAR W/DIFF WBC COUNT: CPT

## 2025-04-17 PROCEDURE — 36415 COLL VENOUS BLD VENIPUNCTURE: CPT

## 2025-04-21 ENCOUNTER — HOSPITAL ENCOUNTER (OUTPATIENT)
Dept: INTERVENTIONAL RADIOLOGY/VASCULAR | Facility: HOSPITAL | Age: 75
Discharge: HOME | End: 2025-04-21
Attending: INTERNAL MEDICINE
Payer: MEDICARE

## 2025-04-21 VITALS
RESPIRATION RATE: 20 BRPM | HEIGHT: 71 IN | TEMPERATURE: 97.5 F | OXYGEN SATURATION: 97 % | BODY MASS INDEX: 29.68 KG/M2 | HEART RATE: 64 BPM | WEIGHT: 212 LBS | SYSTOLIC BLOOD PRESSURE: 140 MMHG | DIASTOLIC BLOOD PRESSURE: 62 MMHG

## 2025-04-21 DIAGNOSIS — D64.9 ACUTE ANEMIA: ICD-10-CM

## 2025-04-21 DIAGNOSIS — D69.6 THROMBOCYTOPENIA (HCC): ICD-10-CM

## 2025-04-21 LAB
ANISOCYTOSIS BLD QL SMEAR: PRESENT
BASOPHILS # BLD MANUAL: 0 THOUSAND/UL (ref 0–0.1)
BASOPHILS NFR MAR MANUAL: 0 % (ref 0–1)
EOSINOPHIL # BLD MANUAL: 0.21 THOUSAND/UL (ref 0–0.4)
EOSINOPHIL NFR BLD MANUAL: 3 % (ref 0–6)
ERYTHROCYTE [DISTWIDTH] IN BLOOD BY AUTOMATED COUNT: 24.7 % (ref 11.6–15.1)
HCT VFR BLD AUTO: 26.7 % (ref 36.5–49.3)
HGB BLD-MCNC: 8 G/DL (ref 12–17)
LYMPHOCYTES # BLD AUTO: 2.11 THOUSAND/UL (ref 0.6–4.47)
LYMPHOCYTES # BLD AUTO: 30 % (ref 14–44)
MACROCYTES BLD QL AUTO: PRESENT
MCH RBC QN AUTO: 36.2 PG (ref 26.8–34.3)
MCHC RBC AUTO-ENTMCNC: 30 G/DL (ref 31.4–37.4)
MCV RBC AUTO: 121 FL (ref 82–98)
METAMYELOCYTE ABSOLUTE CT: 0.07 THOUSAND/UL (ref 0–0.1)
METAMYELOCYTES NFR BLD MANUAL: 1 % (ref 0–1)
MONOCYTES # BLD AUTO: 0.14 THOUSAND/UL (ref 0–1.22)
MONOCYTES NFR BLD: 2 % (ref 4–12)
MYELOCYTE ABSOLUTE CT: 0.28 THOUSAND/UL (ref 0–0.1)
MYELOCYTES NFR BLD MANUAL: 4 % (ref 0–1)
NEUTROPHILS # BLD MANUAL: 4.21 THOUSAND/UL (ref 1.85–7.62)
NEUTS BAND NFR BLD MANUAL: 4 % (ref 0–8)
NEUTS SEG NFR BLD AUTO: 56 % (ref 43–75)
PATHOLOGY REVIEW: YES
PLATELET # BLD AUTO: 38 THOUSANDS/UL (ref 149–390)
PLATELET BLD QL SMEAR: ABNORMAL
PMV BLD AUTO: 12.6 FL (ref 8.9–12.7)
POIKILOCYTOSIS BLD QL SMEAR: PRESENT
POLYCHROMASIA BLD QL SMEAR: PRESENT
RBC # BLD AUTO: 2.21 MILLION/UL (ref 3.88–5.62)
RBC MORPH BLD: PRESENT
TOTAL CELLS COUNTED SPEC: 100
WBC # BLD AUTO: 7.02 THOUSAND/UL (ref 4.31–10.16)

## 2025-04-21 PROCEDURE — C1830 POWER BONE MARROW BX NEEDLE: HCPCS | Performed by: PHYSICIAN ASSISTANT

## 2025-04-21 PROCEDURE — 77002 NEEDLE LOCALIZATION BY XRAY: CPT

## 2025-04-21 PROCEDURE — 88185 FLOWCYTOMETRY/TC ADD-ON: CPT

## 2025-04-21 PROCEDURE — 38222 DX BONE MARROW BX & ASPIR: CPT

## 2025-04-21 PROCEDURE — 85027 COMPLETE CBC AUTOMATED: CPT | Performed by: RADIOLOGY

## 2025-04-21 PROCEDURE — 99152 MOD SED SAME PHYS/QHP 5/>YRS: CPT

## 2025-04-21 PROCEDURE — 88264 CHROMOSOME ANALYSIS 20-25: CPT | Performed by: INTERNAL MEDICINE

## 2025-04-21 PROCEDURE — 88185 FLOWCYTOMETRY/TC ADD-ON: CPT | Performed by: INTERNAL MEDICINE

## 2025-04-21 PROCEDURE — 85007 BL SMEAR W/DIFF WBC COUNT: CPT | Performed by: RADIOLOGY

## 2025-04-21 PROCEDURE — 88374 M/PHMTRC ALYS ISHQUANT/SEMIQ: CPT | Performed by: INTERNAL MEDICINE

## 2025-04-21 PROCEDURE — 88237 TISSUE CULTURE BONE MARROW: CPT | Performed by: INTERNAL MEDICINE

## 2025-04-21 PROCEDURE — 99153 MOD SED SAME PHYS/QHP EA: CPT

## 2025-04-21 RX ORDER — ACETAMINOPHEN 325 MG/1
650 TABLET ORAL EVERY 4 HOURS PRN
Status: DISCONTINUED | OUTPATIENT
Start: 2025-04-21 | End: 2025-04-25 | Stop reason: HOSPADM

## 2025-04-21 RX ORDER — LIDOCAINE WITH 8.4% SOD BICARB 0.9%(10ML)
SYRINGE (ML) INJECTION AS NEEDED
Status: COMPLETED | OUTPATIENT
Start: 2025-04-21 | End: 2025-04-21

## 2025-04-21 RX ORDER — MIDAZOLAM HYDROCHLORIDE 2 MG/2ML
INJECTION, SOLUTION INTRAMUSCULAR; INTRAVENOUS AS NEEDED
Status: COMPLETED | OUTPATIENT
Start: 2025-04-21 | End: 2025-04-21

## 2025-04-21 RX ORDER — SODIUM CHLORIDE 9 MG/ML
30 INJECTION, SOLUTION INTRAVENOUS CONTINUOUS
Status: DISCONTINUED | OUTPATIENT
Start: 2025-04-21 | End: 2025-04-25 | Stop reason: HOSPADM

## 2025-04-21 RX ORDER — FENTANYL CITRATE 50 UG/ML
INJECTION, SOLUTION INTRAMUSCULAR; INTRAVENOUS AS NEEDED
Status: COMPLETED | OUTPATIENT
Start: 2025-04-21 | End: 2025-04-21

## 2025-04-21 RX ADMIN — MIDAZOLAM 1 MG: 1 INJECTION INTRAMUSCULAR; INTRAVENOUS at 08:37

## 2025-04-21 RX ADMIN — MIDAZOLAM 1 MG: 1 INJECTION INTRAMUSCULAR; INTRAVENOUS at 08:44

## 2025-04-21 RX ADMIN — Medication 10 ML: at 08:50

## 2025-04-21 RX ADMIN — MIDAZOLAM 1 MG: 1 INJECTION INTRAMUSCULAR; INTRAVENOUS at 08:50

## 2025-04-21 RX ADMIN — FENTANYL CITRATE 50 MCG: 50 INJECTION, SOLUTION INTRAMUSCULAR; INTRAVENOUS at 08:44

## 2025-04-21 RX ADMIN — FENTANYL CITRATE 50 MCG: 50 INJECTION, SOLUTION INTRAMUSCULAR; INTRAVENOUS at 08:50

## 2025-04-21 RX ADMIN — FENTANYL CITRATE 50 MCG: 50 INJECTION, SOLUTION INTRAMUSCULAR; INTRAVENOUS at 08:37

## 2025-04-21 NOTE — DISCHARGE INSTRUCTIONS
[facility]  Interventional Radiology  [phone]    Procedural Sedation   WHAT YOU NEED TO KNOW:   Procedural sedation is medicine used during procedures to help you feel relaxed and calm. You will remember little to none of the procedure. After sedation you may feel tired, weak, or unsteady on your feet. You may also have trouble concentrating or short-term memory loss. These symptoms should go away in 24 hours or less.   DISCHARGE INSTRUCTIONS:   Call 911 or have someone else call for any of the following:   You have sudden trouble breathing.     You cannot be woken.     Contact Interventional Radiology at [phone]   [place] PATIENTS: Contact Interventional Radiology at [phone] [place] PATIENTS: Contact Interventional Radiology at [phone]) if any of the following occur:      You have a severe headache or dizziness.     Your heart is beating faster than usual.    You have a fever or chills.     Your skin is itchy, swollen, or you have a rash.     You have nausea or are vomiting for more than 8 hours after the procedure.      You have questions or concerns about your condition or care.  Self-care:   Have someone stay with you for 24 hours. This person can drive you to errands and help you do things around the house. This person can also watch for problems.      Rest and do quiet activities for 24 hours. Do not exercise, ride a bike, or play sports. Stand up slowly to prevent dizziness and falls. Take short walks around the house with another person. Slowly return to your usual activities the next day.      Do not drive or use dangerous machines or tools for 24 hours. You may injure yourself or others. Examples include a lawnmower, saw, or drill. Do not return to work for 24 hours if you use dangerous machines or tools for work.      Do not make important decisions for 24 hours. For example, do not sign important papers or invest money.      Drink liquids as directed. Liquids  help flush the sedation medicine out of your body. Ask how much liquid to drink each day and which liquids are best for you.      Eat small, frequent meals to prevent nausea and vomiting. Start with clear liquids such as juice or broth. If you do not vomit after clear liquids, you can eat your usual foods.      Do not drink alcohol or take medicines that make you drowsy. This includes medicines that help you sleep and anxiety medicines. Ask your healthcare provider if it is safe for you to take pain medicine.  Follow up with your healthcare provider as directed: Write down your questions so you remember to ask them during your visits.     Bone Marrow Biopsy     WHAT YOU NEED TO KNOW:   A bone marrow biopsy is a procedure to remove a small amount of bone marrow from your bone. Bone marrow is the soft tissue inside your bone that helps to make blood cells. The sample is tested for disease or infection.    DISCHARGE INSTRUCTIONS:     1. Limit your activities day of biopsy as directed by your doctor.    2. Use medication as ordered.    3. Return to your normal diet.Small sips of flat soda will help with nausea.    4. Remove band-aid or dressing 24 hours after procedure.    Contact Interventional Radiology at [phone] if:    1. Difficulty breathing, nausea or vomiting.    2. Chills or fever above 101 F.    3. Pain at biopsy site not relieved by medication    4. Develop any redness, swelling, heat, unusual drainage, heavy bruising or bleeding from biopsy site.    Your skin is itchy, swollen, or you have a rash.     You have nausea or are vomiting for more than 8 hours after the procedure.      You have questions or concerns about your condition or care.

## 2025-04-21 NOTE — INTERVAL H&P NOTE
Patient presents to IR today for bone marrow biopsy for evaluation of anemia and thrombocytopenia. Endorses being NPO and feeling his usual state of health. Recent HP reviewed, no changes noted.    Discussed bone marrow biopsy with patient. Risks, benefits, and alternatives reviewed. Patient wishes to proceed. ASA 2, Mallampati 2 .     Mode Justice PA-C

## 2025-04-21 NOTE — PROCEDURES
IR BIOPSY BONE MARROW Procedure Note    PATIENT NAME: Rikki Reyes  : 1950  MRN: [id number]    Pre-op Diagnosis:   1. Acute anemia    2. Thrombocytopenia (HCC)      Post-op Diagnosis:   1. Acute anemia    2. Thrombocytopenia (HCC)        Provider:   Mode Jutsice PA-C    Estimated Blood Loss: minimal    Findings: R posterior iliac bone marrow core biopsy and aspiration    Specimens: 1cm core, 5cc aspirate    Complications:  none immediate    Anesthesia: conscious sedation and local    Mode Justice PA-C     Date: 2025  Time: 9:11 AM

## 2025-04-22 ENCOUNTER — TELEPHONE (OUTPATIENT)
Dept: INTERVENTIONAL RADIOLOGY/VASCULAR | Facility: HOSPITAL | Age: 75
End: 2025-04-22

## 2025-04-22 NOTE — TELEPHONE ENCOUNTER
Returned call to patient regarding GI upset overnight and this AM following bone marrow biopsy yesterday morning.     Experiencing come nausea and general fatigue. Likely unrelated to bone marrow biopsy. Did have some relief with zofran he has at home.     Recommend supportive care or evaluation at local ED if symptoms persist/worsen.

## 2025-04-23 LAB — SCAN RESULT: NORMAL

## 2025-04-24 ENCOUNTER — APPOINTMENT (OUTPATIENT)
Dept: LAB | Facility: HOSPITAL | Age: 75
End: 2025-04-24
Payer: MEDICARE

## 2025-04-24 DIAGNOSIS — D69.6 THROMBOCYTOPENIA (HCC): ICD-10-CM

## 2025-04-24 DIAGNOSIS — D64.9 ACUTE ANEMIA: ICD-10-CM

## 2025-04-24 LAB
ANISOCYTOSIS BLD QL SMEAR: PRESENT
BASOPHILS # BLD MANUAL: 0 THOUSAND/UL (ref 0–0.1)
BASOPHILS NFR MAR MANUAL: 0 % (ref 0–1)
EOSINOPHIL # BLD MANUAL: 0 THOUSAND/UL (ref 0–0.4)
EOSINOPHIL NFR BLD MANUAL: 0 % (ref 0–6)
ERYTHROCYTE [DISTWIDTH] IN BLOOD BY AUTOMATED COUNT: 25 % (ref 11.6–15.1)
HCT VFR BLD AUTO: 25.3 % (ref 36.5–49.3)
HGB BLD-MCNC: 7.5 G/DL (ref 12–17)
LG PLATELETS BLD QL SMEAR: PRESENT
LYMPHOCYTES # BLD AUTO: 0.71 THOUSAND/UL (ref 0.6–4.47)
LYMPHOCYTES # BLD AUTO: 8 % (ref 14–44)
MACROCYTES BLD QL AUTO: PRESENT
MCH RBC QN AUTO: 36.9 PG (ref 26.8–34.3)
MCHC RBC AUTO-ENTMCNC: 29.6 G/DL (ref 31.4–37.4)
MCV RBC AUTO: 125 FL (ref 82–98)
METAMYELOCYTE ABSOLUTE CT: 0.18 THOUSAND/UL (ref 0–0.1)
METAMYELOCYTES NFR BLD MANUAL: 2 % (ref 0–1)
MONOCYTES # BLD AUTO: 0.09 THOUSAND/UL (ref 0–1.22)
MONOCYTES NFR BLD: 1 % (ref 4–12)
MYELOCYTE ABSOLUTE CT: 0.18 THOUSAND/UL (ref 0–0.1)
MYELOCYTES NFR BLD MANUAL: 2 % (ref 0–1)
NEUTROPHILS # BLD MANUAL: 7.68 THOUSAND/UL (ref 1.85–7.62)
NEUTS BAND NFR BLD MANUAL: 3 % (ref 0–8)
NEUTS SEG NFR BLD AUTO: 84 % (ref 43–75)
PLATELET # BLD AUTO: 30 THOUSANDS/UL (ref 149–390)
PLATELET BLD QL SMEAR: ABNORMAL
PMV BLD AUTO: 12.3 FL (ref 8.9–12.7)
POIKILOCYTOSIS BLD QL SMEAR: PRESENT
POLYCHROMASIA BLD QL SMEAR: PRESENT
RBC # BLD AUTO: 2.03 MILLION/UL (ref 3.88–5.62)
RBC MORPH BLD: PRESENT
WBC # BLD AUTO: 8.83 THOUSAND/UL (ref 4.31–10.16)

## 2025-04-24 PROCEDURE — 85007 BL SMEAR W/DIFF WBC COUNT: CPT

## 2025-04-24 PROCEDURE — 36415 COLL VENOUS BLD VENIPUNCTURE: CPT

## 2025-04-24 PROCEDURE — 85027 COMPLETE CBC AUTOMATED: CPT

## 2025-04-26 LAB — SCAN RESULT: NORMAL

## 2025-04-28 ENCOUNTER — APPOINTMENT (OUTPATIENT)
Dept: LAB | Facility: HOSPITAL | Age: 75
End: 2025-04-28
Payer: MEDICARE

## 2025-04-28 DIAGNOSIS — D64.9 ACUTE ANEMIA: ICD-10-CM

## 2025-04-28 DIAGNOSIS — D69.6 THROMBOCYTOPENIA (HCC): ICD-10-CM

## 2025-04-28 LAB
ANISOCYTOSIS BLD QL SMEAR: PRESENT
BASOPHILS # BLD MANUAL: 0 THOUSAND/UL (ref 0–0.1)
BASOPHILS NFR MAR MANUAL: 0 % (ref 0–1)
DACRYOCYTES BLD QL SMEAR: PRESENT
EOSINOPHIL # BLD MANUAL: 0.24 THOUSAND/UL (ref 0–0.4)
EOSINOPHIL NFR BLD MANUAL: 4 % (ref 0–6)
ERYTHROCYTE [DISTWIDTH] IN BLOOD BY AUTOMATED COUNT: 25.6 % (ref 11.6–15.1)
HCT VFR BLD AUTO: 25.1 % (ref 36.5–49.3)
HGB BLD-MCNC: 7.5 G/DL (ref 12–17)
LYMPHOCYTES # BLD AUTO: 1.13 THOUSAND/UL (ref 0.6–4.47)
LYMPHOCYTES # BLD AUTO: 19 % (ref 14–44)
MACROCYTES BLD QL AUTO: PRESENT
MCH RBC QN AUTO: 37.7 PG (ref 26.8–34.3)
MCHC RBC AUTO-ENTMCNC: 29.9 G/DL (ref 31.4–37.4)
MCV RBC AUTO: 126 FL (ref 82–98)
MISCELLANEOUS LAB TEST RESULT: NORMAL
MONOCYTES # BLD AUTO: 0.06 THOUSAND/UL (ref 0–1.22)
MONOCYTES NFR BLD: 1 % (ref 4–12)
NEUTROPHILS # BLD MANUAL: 4.51 THOUSAND/UL (ref 1.85–7.62)
NEUTS BAND NFR BLD MANUAL: 3 % (ref 0–8)
NEUTS SEG NFR BLD AUTO: 73 % (ref 43–75)
PLATELET # BLD AUTO: 29 THOUSANDS/UL (ref 149–390)
PLATELET BLD QL SMEAR: ABNORMAL
PMV BLD AUTO: 12.5 FL (ref 8.9–12.7)
POIKILOCYTOSIS BLD QL SMEAR: PRESENT
POLYCHROMASIA BLD QL SMEAR: PRESENT
RBC # BLD AUTO: 1.99 MILLION/UL (ref 3.88–5.62)
RBC MORPH BLD: PRESENT
SCAN RESULT: NORMAL
WBC # BLD AUTO: 5.94 THOUSAND/UL (ref 4.31–10.16)

## 2025-04-28 PROCEDURE — 36415 COLL VENOUS BLD VENIPUNCTURE: CPT

## 2025-04-28 PROCEDURE — 85007 BL SMEAR W/DIFF WBC COUNT: CPT

## 2025-04-28 PROCEDURE — 85027 COMPLETE CBC AUTOMATED: CPT

## 2025-05-01 ENCOUNTER — TELEPHONE (OUTPATIENT)
Dept: HEMATOLOGY ONCOLOGY | Facility: CLINIC | Age: 75
End: 2025-05-01

## 2025-05-01 ENCOUNTER — APPOINTMENT (OUTPATIENT)
Dept: LAB | Facility: HOSPITAL | Age: 75
End: 2025-05-01
Payer: MEDICARE

## 2025-05-01 DIAGNOSIS — D69.6 THROMBOCYTOPENIA (HCC): ICD-10-CM

## 2025-05-01 DIAGNOSIS — D64.9 ACUTE ANEMIA: ICD-10-CM

## 2025-05-01 LAB
ABO GROUP BLD: NORMAL
ACANTHOCYTES BLD QL SMEAR: PRESENT
ANISOCYTOSIS BLD QL SMEAR: PRESENT
BASO STIPL BLD QL SMEAR: PRESENT
BASOPHILS # BLD MANUAL: 0 THOUSAND/UL (ref 0–0.1)
BASOPHILS NFR MAR MANUAL: 0 % (ref 0–1)
BLD GP AB SCN SERPL QL: NEGATIVE
EOSINOPHIL # BLD MANUAL: 0 THOUSAND/UL (ref 0–0.4)
EOSINOPHIL NFR BLD MANUAL: 0 % (ref 0–6)
ERYTHROCYTE [DISTWIDTH] IN BLOOD BY AUTOMATED COUNT: 25.5 % (ref 11.6–15.1)
HCT VFR BLD AUTO: 21.1 % (ref 36.5–49.3)
HGB BLD-MCNC: 6.3 G/DL (ref 12–17)
LYMPHOCYTES # BLD AUTO: 0.69 THOUSAND/UL (ref 0.6–4.47)
LYMPHOCYTES # BLD AUTO: 10 % (ref 14–44)
MACROCYTES BLD QL AUTO: PRESENT
MCH RBC QN AUTO: 37.7 PG (ref 26.8–34.3)
MCHC RBC AUTO-ENTMCNC: 29.9 G/DL (ref 31.4–37.4)
MCV RBC AUTO: 126 FL (ref 82–98)
MONOCYTES # BLD AUTO: 0.21 THOUSAND/UL (ref 0–1.22)
MONOCYTES NFR BLD: 3 % (ref 4–12)
MYELOCYTE ABSOLUTE CT: 0.07 THOUSAND/UL (ref 0–0.1)
MYELOCYTES NFR BLD MANUAL: 1 % (ref 0–1)
NEUTROPHILS # BLD MANUAL: 5.96 THOUSAND/UL (ref 1.85–7.62)
NEUTS BAND NFR BLD MANUAL: 3 % (ref 0–8)
NEUTS SEG NFR BLD AUTO: 83 % (ref 43–75)
PLATELET # BLD AUTO: 31 THOUSANDS/UL (ref 149–390)
PLATELET BLD QL SMEAR: ABNORMAL
PMV BLD AUTO: 12.5 FL (ref 8.9–12.7)
POIKILOCYTOSIS BLD QL SMEAR: PRESENT
POLYCHROMASIA BLD QL SMEAR: PRESENT
RBC # BLD AUTO: 1.67 MILLION/UL (ref 3.88–5.62)
RBC MORPH BLD: PRESENT
RH BLD: NEGATIVE
SPECIMEN EXPIRATION DATE: NORMAL
WBC # BLD AUTO: 6.93 THOUSAND/UL (ref 4.31–10.16)

## 2025-05-01 PROCEDURE — 86900 BLOOD TYPING SEROLOGIC ABO: CPT

## 2025-05-01 PROCEDURE — 36415 COLL VENOUS BLD VENIPUNCTURE: CPT

## 2025-05-01 PROCEDURE — 86901 BLOOD TYPING SEROLOGIC RH(D): CPT

## 2025-05-01 PROCEDURE — 86850 RBC ANTIBODY SCREEN: CPT

## 2025-05-01 PROCEDURE — 85007 BL SMEAR W/DIFF WBC COUNT: CPT

## 2025-05-01 PROCEDURE — 86920 COMPATIBILITY TEST SPIN: CPT

## 2025-05-01 PROCEDURE — 85027 COMPLETE CBC AUTOMATED: CPT

## 2025-05-01 NOTE — TELEPHONE ENCOUNTER
Received lab result, hemoglobin 6.3  Phoned [facility].  Scheduled patient for PRBC transfusion 5/2 at 1200    Phoned patient.  Aware and agreeable for transfusion appt 5/2

## 2025-05-02 ENCOUNTER — HOSPITAL ENCOUNTER (OUTPATIENT)
Dept: INFUSION CENTER | Facility: HOSPITAL | Age: 75
End: 2025-05-02
Payer: MEDICARE

## 2025-05-02 VITALS
TEMPERATURE: 97.7 F | OXYGEN SATURATION: 100 % | RESPIRATION RATE: 16 BRPM | HEART RATE: 61 BPM | SYSTOLIC BLOOD PRESSURE: 122 MMHG | DIASTOLIC BLOOD PRESSURE: 58 MMHG

## 2025-05-02 DIAGNOSIS — D64.9 ACUTE ANEMIA: Primary | ICD-10-CM

## 2025-05-02 PROCEDURE — 36430 TRANSFUSION BLD/BLD COMPNT: CPT

## 2025-05-02 PROCEDURE — P9040 RBC LEUKOREDUCED IRRADIATED: HCPCS

## 2025-05-02 RX ORDER — SODIUM CHLORIDE 9 MG/ML
20 INJECTION, SOLUTION INTRAVENOUS ONCE
OUTPATIENT
Start: 2025-05-02

## 2025-05-02 RX ORDER — SODIUM CHLORIDE 9 MG/ML
20 INJECTION, SOLUTION INTRAVENOUS ONCE
Status: COMPLETED | OUTPATIENT
Start: 2025-05-02 | End: 2025-05-02

## 2025-05-02 RX ADMIN — SODIUM CHLORIDE 20 ML/HR: 0.9 INJECTION, SOLUTION INTRAVENOUS at 12:15

## 2025-05-02 NOTE — PROGRESS NOTES
Rikki Reyes  tolerated blood transfusion treatment well with no complications.      Rikki Reyes is aware of no future appointments at this time.     AVS printed and given to Rikki Reyes: No (Declined by Rikki Reyes).

## 2025-05-03 LAB
ABO GROUP BLD BPU: NORMAL
BPU ID: NORMAL
CROSSMATCH: NORMAL
MISCELLANEOUS LAB TEST RESULT: NORMAL
UNIT DISPENSE STATUS: NORMAL
UNIT PRODUCT CODE: NORMAL
UNIT PRODUCT VOLUME: 350 ML
UNIT RH: NORMAL

## 2025-05-05 ENCOUNTER — APPOINTMENT (OUTPATIENT)
Dept: LAB | Facility: HOSPITAL | Age: 75
End: 2025-05-05
Payer: MEDICARE

## 2025-05-05 DIAGNOSIS — D69.6 THROMBOCYTOPENIA (HCC): ICD-10-CM

## 2025-05-05 DIAGNOSIS — D64.9 ACUTE ANEMIA: ICD-10-CM

## 2025-05-05 LAB
ANISOCYTOSIS BLD QL SMEAR: PRESENT
BASOPHILS # BLD MANUAL: 0 THOUSAND/UL (ref 0–0.1)
BASOPHILS NFR MAR MANUAL: 0 % (ref 0–1)
EOSINOPHIL # BLD MANUAL: 0.25 THOUSAND/UL (ref 0–0.4)
EOSINOPHIL NFR BLD MANUAL: 3 % (ref 0–6)
ERYTHROCYTE [DISTWIDTH] IN BLOOD BY AUTOMATED COUNT: 26.5 % (ref 11.6–15.1)
HCT VFR BLD AUTO: 24.2 % (ref 36.5–49.3)
HGB BLD-MCNC: 7.2 G/DL (ref 12–17)
LYMPHOCYTES # BLD AUTO: 0.82 THOUSAND/UL (ref 0.6–4.47)
LYMPHOCYTES # BLD AUTO: 10 % (ref 14–44)
MACROCYTES BLD QL AUTO: PRESENT
MCH RBC QN AUTO: 36 PG (ref 26.8–34.3)
MCHC RBC AUTO-ENTMCNC: 29.8 G/DL (ref 31.4–37.4)
MCV RBC AUTO: 121 FL (ref 82–98)
MISCELLANEOUS LAB TEST RESULT: NORMAL
MONOCYTES # BLD AUTO: 0 THOUSAND/UL (ref 0–1.22)
MONOCYTES NFR BLD: 0 % (ref 4–12)
MYELOCYTE ABSOLUTE CT: 0.25 THOUSAND/UL (ref 0–0.1)
MYELOCYTES NFR BLD MANUAL: 3 % (ref 0–1)
NEUTROPHILS # BLD MANUAL: 6.88 THOUSAND/UL (ref 1.85–7.62)
NEUTS BAND NFR BLD MANUAL: 1 % (ref 0–8)
NEUTS SEG NFR BLD AUTO: 83 % (ref 43–75)
PLATELET # BLD AUTO: 36 THOUSANDS/UL (ref 149–390)
PLATELET BLD QL SMEAR: ABNORMAL
PMV BLD AUTO: 11.8 FL (ref 8.9–12.7)
POIKILOCYTOSIS BLD QL SMEAR: PRESENT
RBC # BLD AUTO: 2 MILLION/UL (ref 3.88–5.62)
RBC MORPH BLD: PRESENT
WBC # BLD AUTO: 8.19 THOUSAND/UL (ref 4.31–10.16)

## 2025-05-05 PROCEDURE — 36415 COLL VENOUS BLD VENIPUNCTURE: CPT

## 2025-05-05 PROCEDURE — 85027 COMPLETE CBC AUTOMATED: CPT

## 2025-05-05 PROCEDURE — 85007 BL SMEAR W/DIFF WBC COUNT: CPT

## 2025-05-06 ENCOUNTER — TELEPHONE (OUTPATIENT)
Dept: HEMATOLOGY ONCOLOGY | Facility: CLINIC | Age: 75
End: 2025-05-06

## 2025-05-06 ENCOUNTER — OFFICE VISIT (OUTPATIENT)
Dept: HEMATOLOGY ONCOLOGY | Facility: CLINIC | Age: 75
End: 2025-05-06
Payer: MEDICARE

## 2025-05-06 VITALS
RESPIRATION RATE: 18 BRPM | SYSTOLIC BLOOD PRESSURE: 130 MMHG | DIASTOLIC BLOOD PRESSURE: 60 MMHG | TEMPERATURE: 98.3 F | OXYGEN SATURATION: 99 % | HEIGHT: 71 IN | BODY MASS INDEX: 28.28 KG/M2 | WEIGHT: 202 LBS | HEART RATE: 57 BPM

## 2025-05-06 DIAGNOSIS — R68.81 EARLY SATIETY: ICD-10-CM

## 2025-05-06 DIAGNOSIS — D47.9 LYMPHOPROLIFERATIVE DISORDER (HCC): ICD-10-CM

## 2025-05-06 DIAGNOSIS — D46.Z MDS (MYELODYSPLASTIC SYNDROME), HIGH GRADE (HCC): Primary | ICD-10-CM

## 2025-05-06 DIAGNOSIS — R01.1 MURMUR, CARDIAC: ICD-10-CM

## 2025-05-06 DIAGNOSIS — R63.4 WEIGHT LOSS, ABNORMAL: ICD-10-CM

## 2025-05-06 PROCEDURE — 99215 OFFICE O/P EST HI 40 MIN: CPT | Performed by: INTERNAL MEDICINE

## 2025-05-06 PROCEDURE — G2211 COMPLEX E/M VISIT ADD ON: HCPCS | Performed by: INTERNAL MEDICINE

## 2025-05-06 RX ORDER — SODIUM CHLORIDE 9 MG/ML
20 INJECTION, SOLUTION INTRAVENOUS ONCE
OUTPATIENT
Start: 2025-05-27

## 2025-05-06 NOTE — ASSESSMENT & PLAN NOTE
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 The presence of ring chromosomes, EZH2 deletions and ASXL1 mutations are all associated with poor outcome and reduced overall survival in MDS.     High risk according to the IPSS-M, AML transformation risk estimated to be 14% by 1 year.  Median overall survival is estimated to be 1.7 years.    The patient was educated about his high risk MDS and the need to get him started on treatment as soon as possible to decrease his transfusion needs.  His case was discussed with Dr. Ferrera from the bone marrow transplant team at [facility].  The patient is obviously not a candidate for allogeneic bone marrow transplant.  However, he would benefit from azacitidine with adjusted dose of venetoclax.  The first cycle of treatment he will be given 7 days worth of azacitidine 75 mg/m² with venetoclax 400 mg daily x 2 weeks in a 4-week cycle.  The subsequent cycle dose of the venetoclax needs to be adjusted down to 100 mg daily for up to 2 weeks.  He will need to be started on triplet antibiotics including posaconazole, acyclovir and Levaquin starting with cycle 2 for prophylaxis.  He was educated extensively about the potential side effects and toxicity of the recommended regimen.    The patient will need to be supported with blood transfusions to keep his hemoglobin level above 7 g and platelet count above 20,000.     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  In the differential diagnosis of this population include monoclonal B-cell lymphocytosis  and bone marrow involvement by lymphoma.  A CT scan of the chest abdomen pelvis will be ordered in 2-3 months for close follow-up of the abnormalities seen on the recent CT scan from 3/28/2025 including splenomegaly.

## 2025-05-06 NOTE — TELEPHONE ENCOUNTER
Oncology Teach:   Review of Pre-Treatment Needs:  Lab work frequency reviewed    PICC or Port Placement Needs:  Port needed, reviewed w/ pt    Expectations at First Appointment Reviewed:  Yes    Patient Medication Education Reviewed:  Yes    Supportive Medication Reviewed:  OTC reviewed and Confirmed Rx sent to pharmacy    Review when to call office vs. present to ED:  Yes and Magnet provided    Provided Oncology Access Center Number:  Yes    Assessment of patient understanding:  Pt demonstrates understanding    Chemo consent obtained?:  Yes   EDUCATED PT AND HIS WIFE ABOUT THE TX PLAN OF VIDAZA 75 MG/M2 iV DAILY x 7 DAYS Q MONTH AND WITH RX VENETOCLAX 400 MG PO DAILY FOR 14 DAYS OF CYCLE 1 THEN 100 MG DAILY FOR 14 DAYS STARTING CYCLE 2. ORDERS PLACED FOR PORT PLACEMENT AS PT WILL HAVE VIDAZA IV DUE TO OW PLATELET COUNT. CHEMO CONSENT WAS SIGNED EDUCATED PT THAT RX FOR LEVAQUIN AND POSCONAZOLE CAN START WITH CYCLE 2 BUT HE SHOULD START THE ACYCLOVIR NOW.

## 2025-05-06 NOTE — PROGRESS NOTES
Name: Rikki Reyes      : 1950      MRN: [id number]  Encounter Provider: Navarro Woods MD  Encounter Date: 2025   Encounter department: [facility] H[facility]AT[facility] [place]  :  Assessment & Plan  MDS (myelodysplastic syndrome), high grade (HCC)  Pancytopenia, status post bone marrow biopsy on 2025 which showed:  MARKEDLY HYPERCELLULAR BONE MARROW (>90%), WITH NO OVERT INCREASE IN BLASTS (2-3%) WITH MULTILINEAGE DYSPLASIA; MINUTE, MONOTYPIC LAMBDA, CD5-NEGATIVE, [id number] B-CELL POPULATION DETECTED BY FLOW CYTOMETRY   The overall myeloid findings are consistent with 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 The presence of ring chromosomes, EZH2 deletions and ASXL1 mutations are all associated with poor outcome and reduced overall survival in MDS.     High risk according to the IPSS-M, AML transformation risk estimated to be 14% by 1 year.  Median overall survival is estimated to be 1.7 years.    The patient was educated about his high risk MDS and the need to get him started on treatment as soon as possible to decrease his transfusion needs.  His case was discussed with Dr. Ferrera from the bone marrow transplant team at [facility].  The patient is obviously not a candidate for allogeneic bone marrow transplant.  However, he would benefit from azacitidine with adjusted dose of venetoclax.  The first cycle of treatment he will be given 7 days worth of azacitidine 75 mg/m² with venetoclax 400 mg daily x 2 weeks in a 4-week cycle.  The subsequent cycle dose of the venetoclax needs to be adjusted down to 100 mg daily for up to 2 weeks.  He will need to be started on triplet antibiotics including posaconazole, acyclovir and Levaquin starting with cycle 2 for prophylaxis.  He was educated extensively about the potential side effects and toxicity of the recommended regimen.    The patient will need to be supported with  blood transfusions to keep his hemoglobin level above 7 g and platelet count above 20,000.     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  In the differential diagnosis of this population include monoclonal B-cell lymphocytosis and bone marrow involvement by lymphoma.  A CT scan of the chest abdomen pelvis will be ordered in 2-3 months for close follow-up of the abnormalities seen on the recent CT scan from 3/28/2025 including splenomegaly.         Murmur, cardiac  A harsh systolic murmur was heard.  He was offered to see the cardiology team for further evaluation.  Orders:    Ambulatory Referral to Cardiology; Future    Lymphoproliferative disorder (HCC)             Return in about 6 weeks (around 6/17/2025) for Office Visit 20 min, Labs, Infusion.    History of Present Illness   Chief Complaint   Patient presents with    Follow-up     This is a 75-year-old with history of coronary artery disease, diabetes mellitus and hypertension.  He reported losing 25 pounds with an the last 2 to 3 months.  He also seems to be very exhausted and very pale.  The patient was found to have significant drop of his hemoglobin level according to the blood work from 3/21/2025 where his hemoglobin was around 7.6 with MCV of 420.  White cell count was 12.4 with a platelet count of 72.  Creatinine was 0.8 with normal calcium and her enzymes.  Vitamin B12 was elevated above 4000.  TSH was normal.  Iron panel showed ferritin of 497 with saturation of 18%.  He did have upper and lower endoscopy on 3/25/2025 which was negative for any obvious malignant process or bleeding.  He denied obvious bleeding from any sites.  He     He did have a CT scan of the abdomen pelvis with contrast on 3/28/2025 which showed  IMPRESSION:     1.  Although there are no overt CT findings for cirrhosis there are findings for a subtle recanalized umbilical vein. In addition, subtle branching hyperdensity in the  left lobe of the liver laterally best seen on the portal venous phase images, matching   hepatic veins in intensity may represent arteriovenous malformation. This could be further evaluated with dedicated liver MRI with and without IV contrast.  2.  Splenomegaly. Shotty appearing retroperitoneal lymph nodes some which are mildly prominent, nonspecific could be reactive. A lymphoproliferative process is not excluded. Recommend follow-up CT in 3-6 months.  3.  Mild pelvic ascites.  4.  Scattered tree-in-bud opacities at the bilateral lung bases suggesting bronchiolitis. There is mild pulmonary nodularity measuring up to 7 mm in size. Based on current Fleischner Society 2017 Guidelines on incidental pulmonary nodule, follow-up   non-contrast chest CT is recommended at 3-6 months from the initial examination and, if stable at that time, an additional follow-up is recommended for 18-24 months from the initial examination.  Ultrasound of the liver and ultrasound elastography was negative for obvious hint of liver cirrhosis.    Interval history:  The patient came in today for a follow-up visit accompanied by his wife.  He received multiple units of packed red blood cells to keep his hemoglobin level above 7 g.  His platelet count seems to be around 36,000.  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 The presence of ring chromosomes, EZH2 deletions and ASXL1 mutations are all associated with poor outcome and reduced overall survival in MDS.   Flow cytometry also detects a minute, monotypic lambda, CD5-negative, [id number] B-cell  population.   The differential diagnosis of this population includes a monoclonal B-cell lymphocytosis (MBL) and bone marrow involvement by lymphoma.             Oncology History   Cancer Staging   No matching staging information was found for the patient.  Oncology History   MDS (myelodysplastic syndrome), high grade (HCC)   5/6/2025 Initial Diagnosis    MDS (myelodysplastic syndrome), high grade (HCC)     5/26/2025 -  Chemotherapy    azaCITIDine (VIDAZA) IVPB, 75 mg/m2, 0 of 6 cycles             Review of Systems   Constitutional:  Positive for fatigue. Negative for chills and fever.   HENT:  Negative for ear pain and sore throat.    Eyes:  Negative for pain and visual disturbance.   Respiratory:  Positive for cough and shortness of breath.    Cardiovascular:  Negative for chest pain and palpitations.   Gastrointestinal:  Positive for constipation, diarrhea and nausea. Negative for abdominal pain and vomiting.   Genitourinary:  Negative for dysuria and hematuria.   Musculoskeletal:  Negative for arthralgias and back pain.   Skin:  Negative for color change and rash.   Neurological:  Positive for numbness. Negative for seizures and syncope.   All other systems reviewed and are negative.    Medical History Reviewed by provider this encounter:     .  Current Outpatient Medications on File Prior to Visit   Medication Sig Dispense Refill    Aspirin 81 MG CAPS Take 1 capsule by mouth in the morning      carvedilol (COREG) 3.125 mg tablet Take 1 tablet (3.125 mg total) by mouth 2 (two) times a day with meals 180 tablet 3    Cholecalciferol (D3-1000) 25 MCG (1000 UT) capsule Take 1,000 Units by mouth daily      omeprazole (PriLOSEC) 40 MG capsule Take 1 capsule (40 mg total) by mouth daily 30 capsule 3    ondansetron (ZOFRAN) 4 mg tablet Take 1 tablet (4 mg total) by mouth every 8 (eight) hours as needed for nausea or vomiting 45 tablet 0    Rosuvastatin Calcium 20 MG CPSP Take 1 capsule by mouth in the morning 90 capsule  "3    tamsulosin (FLOMAX) 0.4 mg       Cyanocobalamin (B-12) 1000 MCG TABS Place 1 tablet under the tongue in the morning (Patient not taking: Reported on 2025)      [] predniSONE 10 mg tablet Take 2 tablets (20 mg total) by mouth daily for 7 days, THEN 1 tablet (10 mg total) daily for 7 days, THEN 0.5 tablets (5 mg total) daily for 7 days. (Patient not taking: Reported on 2025) 25 tablet 0     Current Facility-Administered Medications on File Prior to Visit   Medication Dose Route Frequency Provider Last Rate Last Admin    [DISCONTINUED] alteplase (CATHFLO) injection 2 mg  2 mg Intracatheter Q1MIN PRN Navarro Woods MD          Social History     Tobacco Use    Smoking status: Former     Current packs/day: 0.00     Average packs/day: 1.5 packs/day for 20.0 years (30.0 ttl pk-yrs)     Types: Cigarettes     Start date: 1970     Quit date: 1990     Years since quittin.3    Smokeless tobacco: Never   Vaping Use    Vaping status: Never Used   Substance and Sexual Activity    Alcohol use: Not Currently    Drug use: Never    Sexual activity: Not Currently     Partners: Female         Objective   /60 (BP Location: Right arm, Patient Position: Sitting, Cuff Size: Adult)   Pulse 57   Temp 98.3 °F (36.8 °C)   Resp 18   Ht 5' 11\" (1.803 m)   Wt 91.6 kg (202 lb)   SpO2 99%   BMI 28.17 kg/m²     Pain Screening:  Pain Score: 0-No pain  ECOG   4  Physical Exam  Constitutional:       Appearance: He is well-developed.   HENT:      Head: Normocephalic and atraumatic.      Nose: Nose normal.   Eyes:      General: No scleral icterus.        Right eye: No discharge.         Left eye: No discharge.      Conjunctiva/sclera: Conjunctivae normal.      Pupils: Pupils are equal, round, and reactive to light.   Neck:      Thyroid: No thyromegaly.      Trachea: No tracheal deviation.   Cardiovascular:      Rate and Rhythm: Normal rate and regular rhythm.      Heart sounds: Normal heart sounds. No murmur " heard.     No friction rub.   Pulmonary:      Effort: Pulmonary effort is normal. No respiratory distress.      Breath sounds: Normal breath sounds. No wheezing or rales.   Chest:      Chest wall: No tenderness.   Abdominal:      General: There is no distension.      Palpations: Abdomen is soft. There is no hepatomegaly or splenomegaly.      Tenderness: There is no abdominal tenderness. There is no guarding or rebound.   Musculoskeletal:         General: No tenderness or deformity. Normal range of motion.      Cervical back: Normal range of motion and neck supple.   Lymphadenopathy:      Cervical: No cervical adenopathy.   Skin:     General: Skin is warm and dry.      Coloration: Skin is pale.      Findings: Bruising present. No erythema or rash.   Neurological:      Mental Status: He is alert and oriented to person, place, and time.      Cranial Nerves: No cranial nerve deficit.      Coordination: Coordination normal.      Deep Tendon Reflexes: Reflexes are normal and symmetric.   Psychiatric:         Behavior: Behavior normal.         Thought Content: Thought content normal.         Judgment: Judgment normal.         Labs: I have reviewed the following labs:  Lab Results   Component Value Date/Time    WBC 8.19 05/05/2025 10:48 AM    RBC 2.00 (L) 05/05/2025 10:48 AM    Hemoglobin 7.2 (L) 05/05/2025 10:48 AM    Hematocrit 24.2 (L) 05/05/2025 10:48 AM     (H) 05/05/2025 10:48 AM    MCH 36.0 (H) 05/05/2025 10:48 AM    RDW 26.5 (H) 05/05/2025 10:48 AM    Platelets 36 (L) 05/05/2025 10:48 AM    Segmented % 73 10/07/2024 12:53 PM    Lymphocytes % 10 (L) 05/05/2025 10:48 AM    Lymphocytes % 20 10/07/2024 12:53 PM    Monocytes % 0 (L) 05/05/2025 10:48 AM    Monocytes % 4 10/07/2024 12:53 PM    Eosinophils % 3 05/05/2025 10:48 AM    Eosinophils Relative 3 10/07/2024 12:53 PM    Basophils % 0 05/05/2025 10:48 AM    Basophils Relative 0 10/07/2024 12:53 PM    Immature Grans % 0 10/07/2024 12:53 PM    Absolute  Neutrophils 3.47 10/07/2024 12:53 PM     Lab Results   Component Value Date/Time    Potassium 3.7 04/09/2025 12:52 PM    Chloride 101 04/09/2025 12:52 PM    CO2 27 04/09/2025 12:52 PM    BUN 12 04/09/2025 12:52 PM    Creatinine 0.86 04/09/2025 12:52 PM    Glucose, Fasting 134 (H) 03/21/2025 01:18 PM    Calcium 8.8 04/09/2025 12:52 PM    Corrected Calcium 9.3 04/09/2025 12:52 PM    AST 8 (L) 04/09/2025 12:52 PM    ALT 6 (L) 04/09/2025 12:52 PM    Alkaline Phosphatase 54 04/09/2025 12:52 PM    Total Protein 6.3 (L) 04/09/2025 12:52 PM    Albumin 3.4 (L) 04/09/2025 12:52 PM    Total Bilirubin 2.30 (H) 04/09/2025 12:52 PM    eGFR 86 04/09/2025 12:52 PM     Lab Results   Component Value Date/Time    WBC 8.19 05/05/2025 10:48 AM    RBC 2.00 (L) 05/05/2025 10:48 AM    Hemoglobin 7.2 (L) 05/05/2025 10:48 AM    Hematocrit 24.2 (L) 05/05/2025 10:48 AM     (H) 05/05/2025 10:48 AM    MCH 36.0 (H) 05/05/2025 10:48 AM    RDW 26.5 (H) 05/05/2025 10:48 AM    Platelets 36 (L) 05/05/2025 10:48 AM    Segmented % 73 10/07/2024 12:53 PM    Bands % 1 05/05/2025 10:48 AM    Lymphocytes % 10 (L) 05/05/2025 10:48 AM    Lymphocytes % 20 10/07/2024 12:53 PM    Monocytes % 0 (L) 05/05/2025 10:48 AM    Monocytes % 4 10/07/2024 12:53 PM    Eosinophils % 3 05/05/2025 10:48 AM    Eosinophils Relative 3 10/07/2024 12:53 PM    Basophils % 0 05/05/2025 10:48 AM    Basophils Relative 0 10/07/2024 12:53 PM    Immature Grans % 0 10/07/2024 12:53 PM    Absolute Neutrophils 3.47 10/07/2024 12:53 PM      Lab Results   Component Value Date/Time    Sodium 135 04/09/2025 12:52 PM    Potassium 3.7 04/09/2025 12:52 PM    Chloride 101 04/09/2025 12:52 PM    CO2 27 04/09/2025 12:52 PM    ANION GAP 7 04/09/2025 12:52 PM    BUN 12 04/09/2025 12:52 PM    Creatinine 0.86 04/09/2025 12:52 PM    Glucose 130 04/09/2025 12:52 PM    Glucose, Fasting 134 (H) 03/21/2025 01:18 PM    Calcium 8.8 04/09/2025 12:52 PM    Corrected Calcium 9.3 04/09/2025 12:52 PM    AST 8  (L) 04/09/2025 12:52 PM    ALT 6 (L) 04/09/2025 12:52 PM    Alkaline Phosphatase 54 04/09/2025 12:52 PM    Total Protein 6.3 (L) 04/09/2025 12:52 PM    Albumin 3.4 (L) 04/09/2025 12:52 PM    Total Bilirubin 2.30 (H) 04/09/2025 12:52 PM    eGFR 86 04/09/2025 12:52 PM      Lab Results   Component Value Date/Time    Iron 56 04/08/2025 01:08 PM    Iron Saturation 25 04/08/2025 01:08 PM    Ferritin 397 (H) 04/08/2025 01:08 PM    Vitamin B-12 3,721 (H) 04/08/2025 01:08 PM    Folate 6.8 03/21/2025 01:18 PM    Erythropoietin 1011.0 (H) 04/08/2025 01:08 PM    Sed Rate 24 (H) 04/08/2025 01:08 PM    CRP 67.5 (H) 04/08/2025 01:08 PM      Results for orders placed or performed in visit on 05/05/25   CBC and differential   Result Value Ref Range    WBC 8.19 4.31 - 10.16 Thousand/uL    RBC 2.00 (L) 3.88 - 5.62 Million/uL    Hemoglobin 7.2 (L) 12.0 - 17.0 g/dL    Hematocrit 24.2 (L) 36.5 - 49.3 %     (H) 82 - 98 fL    MCH 36.0 (H) 26.8 - 34.3 pg    MCHC 29.8 (L) 31.4 - 37.4 g/dL    RDW 26.5 (H) 11.6 - 15.1 %    MPV 11.8 8.9 - 12.7 fL    Platelets 36 (L) 149 - 390 Thousands/uL   Manual Differential(PHLEBS Do Not Order)   Result Value Ref Range    Segmented % 83 (H) 43 - 75 %    Bands % 1 0 - 8 %    Lymphocytes % 10 (L) 14 - 44 %    Monocytes % 0 (L) 4 - 12 %    Eosinophils % 3 0 - 6 %    Basophils % 0 0 - 1 %    Myelocytes % 3 (H) 0 - 1 %    Absolute Neutrophils 6.88 1.85 - 7.62 Thousand/uL    Absolute Lymphocytes 0.82 0.60 - 4.47 Thousand/uL    Absolute Monocytes 0.00 0.00 - 1.22 Thousand/uL    Absolute Eosinophils 0.25 0.00 - 0.40 Thousand/uL    Absolute Basophils 0.00 0.00 - 0.10 Thousand/uL    Absolute Myelocytes 0.25 (H) 0.00 - 0.10 Thousand/uL    Total Counted      RBC Morphology Present     Platelet Estimate Decreased (A) Adequate    Anisocytosis Present     Macrocytes Present     Poikilocytes Present

## 2025-05-07 ENCOUNTER — TELEPHONE (OUTPATIENT)
Age: 75
End: 2025-05-07

## 2025-05-07 LAB
ANISOCYTOSIS BLD QL SMEAR: PRESENT
EOSINOPHIL # BLD AUTO: 0.21 THOUSAND/UL (ref 0–0.61)
EOSINOPHIL NFR BLD MANUAL: 3 % (ref 0–6)
LYMPHOCYTES # BLD AUTO: 2.11 THOUSAND/UL (ref 0.6–4.47)
LYMPHOCYTES # BLD AUTO: 30 %
MACROCYTES BLD QL AUTO: PRESENT
METAMYELOCYTES # BLD MANUAL: 0.07 THOUSAND/UL (ref 0–0.1)
METAMYELOCYTES NFR BLD MANUAL: 1 % (ref 0–1)
MONOCYTES # BLD AUTO: 0.14 THOUSAND/UL (ref 0–1.22)
MONOCYTES NFR BLD AUTO: 2 % (ref 4–12)
MYELOCYTES # BLD MANUAL: 0.28 THOUSAND/UL (ref 0–0.1)
MYELOCYTES NFR BLD MANUAL: 4 % (ref 0–1)
NEUTS BAND NFR BLD MANUAL: 4 % (ref 0–8)
NEUTS SEG # BLD: 4.21 THOUSAND/UL (ref 1.81–6.82)
NEUTS SEG NFR BLD AUTO: 56 %
PATHOLOGY REVIEW: YES
PLATELET BLD QL SMEAR: ABNORMAL
POIKILOCYTOSIS BLD QL SMEAR: PRESENT
POLYCHROMASIA BLD QL SMEAR: PRESENT
RBC MORPH BLD: PRESENT
TOTAL CELLS COUNTED SPEC: 100

## 2025-05-07 RX ORDER — OMEPRAZOLE 40 MG/1
40 CAPSULE, DELAYED RELEASE ORAL DAILY
Qty: 90 CAPSULE | Refills: 1 | Status: SHIPPED | OUTPATIENT
Start: 2025-05-07

## 2025-05-07 RX ORDER — LEVOFLOXACIN 500 MG/1
500 TABLET, FILM COATED ORAL EVERY 24 HOURS
Qty: 30 TABLET | Refills: 11 | Status: SHIPPED | OUTPATIENT
Start: 2025-05-07 | End: 2026-05-07

## 2025-05-07 RX ORDER — POSACONAZOLE 100 MG/1
300 TABLET, DELAYED RELEASE ORAL DAILY
Qty: 90 TABLET | Refills: 11 | Status: SHIPPED | OUTPATIENT
Start: 2025-05-07 | End: 2026-05-07

## 2025-05-07 RX ORDER — ACYCLOVIR 400 MG/1
400 TABLET ORAL 2 TIMES DAILY
Qty: 60 TABLET | Refills: 11 | Status: SHIPPED | OUTPATIENT
Start: 2025-05-07 | End: 2026-05-07

## 2025-05-07 NOTE — TELEPHONE ENCOUNTER
Called and spoke to patient directly. Patient scheduled to begin treatment at [facility] 5/27 as requested. Patient port placement is schedule at [facility] for 5/23. Reviewed lab requirements and scheduled appointment. Patient expressed understanding. Answered any patient questions and offered support as needed.

## 2025-05-07 NOTE — TELEPHONE ENCOUNTER
Michael galloway [facility] (supporting Navarro Woods MD)         5/7/25  8:26 AM  I see a message was sent to me this morning, but I can’t find it in my chart.     I also see that there’s something about an appointment with interventional radiology, but I can’t seem to make that either.     Also, when we left the office last night The staff had gone home so we were unable to see anybody as we checked out. Let us know what to do.

## 2025-05-07 NOTE — TELEPHONE ENCOUNTER
Phone outreach to the patient in response to his [employer] message this morning.  I spoke with Michael, he reports Dr. Woods put in a referral to IR for a port placement and reports needs to schedule an appointment.  I gave him the number to IR scheduling to call and make an appointment.  He thanked me. He reports did not receive any after visit summery yesterday and I informed him that I can send it via [employer], he declined and stated he can see it in [employer].  He had no further questions and he thanked me.

## 2025-05-08 ENCOUNTER — TELEPHONE (OUTPATIENT)
Dept: HEMATOLOGY ONCOLOGY | Facility: CLINIC | Age: 75
End: 2025-05-08

## 2025-05-08 ENCOUNTER — TELEPHONE (OUTPATIENT)
Dept: SURGICAL ONCOLOGY | Facility: CLINIC | Age: 75
End: 2025-05-08

## 2025-05-08 ENCOUNTER — APPOINTMENT (OUTPATIENT)
Dept: LAB | Facility: HOSPITAL | Age: 75
End: 2025-05-08
Attending: INTERNAL MEDICINE
Payer: MEDICARE

## 2025-05-08 ENCOUNTER — RESULTS FOLLOW-UP (OUTPATIENT)
Dept: HEMATOLOGY ONCOLOGY | Facility: CLINIC | Age: 75
End: 2025-05-08

## 2025-05-08 DIAGNOSIS — D69.6 THROMBOCYTOPENIA (HCC): ICD-10-CM

## 2025-05-08 DIAGNOSIS — D64.9 ACUTE ANEMIA: ICD-10-CM

## 2025-05-08 DIAGNOSIS — I10 PRIMARY HYPERTENSION: ICD-10-CM

## 2025-05-08 DIAGNOSIS — D47.9 LYMPHOPROLIFERATIVE DISORDER (HCC): Primary | ICD-10-CM

## 2025-05-08 DIAGNOSIS — D46.Z MDS (MYELODYSPLASTIC SYNDROME), HIGH GRADE (HCC): ICD-10-CM

## 2025-05-08 DIAGNOSIS — E11.9 DM TYPE 2 WITHOUT RETINOPATHY (HCC): ICD-10-CM

## 2025-05-08 LAB
ABO GROUP BLD: NORMAL
ANISOCYTOSIS BLD QL SMEAR: PRESENT
BASOPHILS # BLD MANUAL: 0 THOUSAND/UL (ref 0–0.1)
BASOPHILS NFR MAR MANUAL: 0 % (ref 0–1)
BLD GP AB SCN SERPL QL: NEGATIVE
EOSINOPHIL # BLD MANUAL: 0.25 THOUSAND/UL (ref 0–0.4)
EOSINOPHIL NFR BLD MANUAL: 4 % (ref 0–6)
HCT VFR BLD AUTO: 21.5 % (ref 36.5–49.3)
HGB BLD-MCNC: 6.4 G/DL (ref 12–17)
LYMPHOCYTES # BLD AUTO: 0.62 THOUSAND/UL (ref 0.6–4.47)
LYMPHOCYTES # BLD AUTO: 10 % (ref 14–44)
MACROCYTES BLD QL AUTO: PRESENT
MCH RBC QN AUTO: 36.4 PG (ref 26.8–34.3)
MCHC RBC AUTO-ENTMCNC: 29.8 G/DL (ref 31.4–37.4)
MCV RBC AUTO: 122 FL (ref 82–98)
MONOCYTES # BLD AUTO: 0 THOUSAND/UL (ref 0–1.22)
MONOCYTES NFR BLD: 0 % (ref 4–12)
NEUTROPHILS # BLD MANUAL: 5.32 THOUSAND/UL (ref 1.85–7.62)
NEUTS BAND NFR BLD MANUAL: 2 % (ref 0–8)
NEUTS SEG NFR BLD AUTO: 84 % (ref 43–75)
PLATELET # BLD AUTO: 32 THOUSANDS/UL (ref 149–390)
PLATELET BLD QL SMEAR: ABNORMAL
PMV BLD AUTO: 12.8 FL (ref 8.9–12.7)
POIKILOCYTOSIS BLD QL SMEAR: PRESENT
POLYCHROMASIA BLD QL SMEAR: PRESENT
RBC # BLD AUTO: 1.76 MILLION/UL (ref 3.88–5.62)
RBC MORPH BLD: PRESENT
RH BLD: NEGATIVE
SPECIMEN EXPIRATION DATE: NORMAL
WBC # BLD AUTO: 6.19 THOUSAND/UL (ref 4.31–10.16)

## 2025-05-08 PROCEDURE — 36415 COLL VENOUS BLD VENIPUNCTURE: CPT

## 2025-05-08 PROCEDURE — 86920 COMPATIBILITY TEST SPIN: CPT

## 2025-05-08 PROCEDURE — 86900 BLOOD TYPING SEROLOGIC ABO: CPT

## 2025-05-08 PROCEDURE — 86850 RBC ANTIBODY SCREEN: CPT

## 2025-05-08 PROCEDURE — 85027 COMPLETE CBC AUTOMATED: CPT

## 2025-05-08 PROCEDURE — 86901 BLOOD TYPING SEROLOGIC RH(D): CPT

## 2025-05-08 PROCEDURE — 85007 BL SMEAR W/DIFF WBC COUNT: CPT

## 2025-05-08 RX ORDER — SODIUM CHLORIDE 9 MG/ML
20 INJECTION, SOLUTION INTRAVENOUS ONCE
Status: CANCELLED | OUTPATIENT
Start: 2025-05-09

## 2025-05-08 NOTE — TELEPHONE ENCOUNTER
Me to JD Nowak RN  [facility]    5/8/25 11:28 AM  I spoke to the patient To get information to enroll him with [employer] for a george for his diagnosis which covers the Venetoclax.    [employer] ID: [id number]  Card ID: [id number]  Start Date: 04/08/2025  End Date: 04/07/26  Bin: [id number]  PCN: [id number]  George Amount: $6,500.00  Yuni Disla RN to Elaine Goldstein RN       5/8/25 10:33 AM  Yes please.  I think this was end of day patient and guessing email did not get sent.  I see Kendra sent the script  Elaine Goldstein RN to Yuni Disla RN       5/8/25  9:41 AM  I don't have anything for this jay's Venetoclax. Do you want me to send an email per the office note dosing?    5/8/25  9:23 AM  Marielena Ross routed this conversation to Me  Thao Santos RN to Hematology Oncology [place] [facility]       5/8/25  9:15 AM  Attn: JD Morrissey & Dr. Chuck Reyes to [facility] (supporting Navarro Woods MD)         5/8/25  9:12 AM     Can you tell me what timeframe I should be expecting some information from the resources you just mentioned?  Yuni Disla RN to Oncology Financial Advocacy       5/8/25  9:11 AM  Adding financial advocacy to also assist patient    5/8/25  9:08 AM  Rosenda Fay RN routed this conversation to H[facility]ology [facility] [place] [facility]  Hematology & Oncology Pod Medication Prior Authorizations  [facility] On[facility] Procedure Prior Authorizations  Rosenda Fay RN to Michael Reyes         5/8/25  9:08 AM  Fazal Rodriguez,  I know this can be very stressful but please try not to worry too much just yet. Our authorization team will be working on checking with your insurance regarding what is covered and any out of pocket costs. Depending on that, we also a financial advocacy team that helps if things are covered or there is a high out of pocket cost. I will pass your message on to our teams to let you know when these authorizations have an update so we can keep you in the  farheen Herzog RN

## 2025-05-08 NOTE — TELEPHONE ENCOUNTER
Attempted to phone [facility] pharmacy to inquire about concerns patient presented in my chart message.  Patient states there is a problem with filling Posaconazole.  Left voice message for pharmacy, requesting return call.  Provided hopeline number for return call.

## 2025-05-08 NOTE — TELEPHONE ENCOUNTER
Received a phone call from pharmacy.  Pharmacy stated that the Posaconazole needs a prior auth and a form was sent to complete.  Pharmacy also stated that they won't know the cost until prior auth is completed.

## 2025-05-08 NOTE — TELEPHONE ENCOUNTER
----- Message from Navarro Woods MD sent at 5/8/2025  1:45 PM EDT -----  Hemoglobin 6.4 for blood transfusion.      Phoned Natacha diana.  Transfusion scheduled for 1pm 5/9.  Phoned patient.  Aware and agreeable for transfusion as scheduled.  Provided information to patient for upcoming treatment.  Reviewed to continue to reach out with questions and concerns

## 2025-05-09 ENCOUNTER — HOSPITAL ENCOUNTER (OUTPATIENT)
Dept: INFUSION CENTER | Facility: HOSPITAL | Age: 75
End: 2025-05-09
Payer: MEDICARE

## 2025-05-09 VITALS
TEMPERATURE: 97.3 F | DIASTOLIC BLOOD PRESSURE: 52 MMHG | OXYGEN SATURATION: 98 % | RESPIRATION RATE: 18 BRPM | HEART RATE: 80 BPM | SYSTOLIC BLOOD PRESSURE: 113 MMHG

## 2025-05-09 DIAGNOSIS — D64.9 ACUTE ANEMIA: Primary | ICD-10-CM

## 2025-05-09 PROCEDURE — 36430 TRANSFUSION BLD/BLD COMPNT: CPT

## 2025-05-09 PROCEDURE — P9040 RBC LEUKOREDUCED IRRADIATED: HCPCS

## 2025-05-09 RX ORDER — SODIUM CHLORIDE 9 MG/ML
20 INJECTION, SOLUTION INTRAVENOUS ONCE
Status: COMPLETED | OUTPATIENT
Start: 2025-05-09 | End: 2025-05-09

## 2025-05-09 RX ADMIN — SODIUM CHLORIDE 20 ML/HR: 0.9 INJECTION, SOLUTION INTRAVENOUS at 13:10

## 2025-05-09 NOTE — PROGRESS NOTES
Rikki Reyes  tolerated blood transfusion treatment well with no complications.      Rikki Reyes is aware of future appt on 5/27 at 11:30AM.     AVS printed and given to Rikki Reyes.

## 2025-05-12 ENCOUNTER — TELEPHONE (OUTPATIENT)
Dept: INTERVENTIONAL RADIOLOGY/VASCULAR | Facility: HOSPITAL | Age: 75
End: 2025-05-12

## 2025-05-12 ENCOUNTER — APPOINTMENT (OUTPATIENT)
Dept: LAB | Facility: HOSPITAL | Age: 75
End: 2025-05-12
Payer: MEDICARE

## 2025-05-12 DIAGNOSIS — D46.Z MDS (MYELODYSPLASTIC SYNDROME), HIGH GRADE (HCC): ICD-10-CM

## 2025-05-12 DIAGNOSIS — D69.6 THROMBOCYTOPENIA (HCC): ICD-10-CM

## 2025-05-12 DIAGNOSIS — D64.9 ACUTE ANEMIA: ICD-10-CM

## 2025-05-12 DIAGNOSIS — D46.Z MDS (MYELODYSPLASTIC SYNDROME), HIGH GRADE (HCC): Primary | ICD-10-CM

## 2025-05-12 LAB
ACANTHOCYTES BLD QL SMEAR: PRESENT
ANISOCYTOSIS BLD QL SMEAR: PRESENT
BASO STIPL BLD QL SMEAR: PRESENT
BASOPHILS # BLD MANUAL: 0 THOUSAND/UL (ref 0–0.1)
BASOPHILS NFR MAR MANUAL: 0 % (ref 0–1)
EOSINOPHIL # BLD MANUAL: 0.15 THOUSAND/UL (ref 0–0.4)
EOSINOPHIL NFR BLD MANUAL: 2 % (ref 0–6)
ERYTHROCYTE [DISTWIDTH] IN BLOOD BY AUTOMATED COUNT: 24.7 % (ref 11.6–15.1)
HCT VFR BLD AUTO: 23 % (ref 36.5–49.3)
HGB BLD-MCNC: 6.9 G/DL (ref 12–17)
LYMPHOCYTES # BLD AUTO: 0.85 THOUSAND/UL (ref 0.6–4.47)
LYMPHOCYTES # BLD AUTO: 11 % (ref 14–44)
MACROCYTES BLD QL AUTO: PRESENT
MCH RBC QN AUTO: 35 PG (ref 26.8–34.3)
MCHC RBC AUTO-ENTMCNC: 30 G/DL (ref 31.4–37.4)
MCV RBC AUTO: 117 FL (ref 82–98)
MONOCYTES # BLD AUTO: 0 THOUSAND/UL (ref 0–1.22)
MONOCYTES NFR BLD: 0 % (ref 4–12)
NEUTROPHILS # BLD MANUAL: 6.66 THOUSAND/UL (ref 1.85–7.62)
NEUTS BAND NFR BLD MANUAL: 2 % (ref 0–8)
NEUTS SEG NFR BLD AUTO: 84 % (ref 43–75)
OVALOCYTES BLD QL SMEAR: PRESENT
PLATELET # BLD AUTO: 37 THOUSANDS/UL (ref 149–390)
PLATELET BLD QL SMEAR: ABNORMAL
PMV BLD AUTO: 12.8 FL (ref 8.9–12.7)
POLYCHROMASIA BLD QL SMEAR: PRESENT
PROMYELOCYTE ABSOLUTE CT: 0.08 THOUSAND/UL (ref 0–0)
PROMYELOCYTES NFR BLD MANUAL: 1 % (ref 0–0)
RBC # BLD AUTO: 1.97 MILLION/UL (ref 3.88–5.62)
RBC MORPH BLD: PRESENT
WBC # BLD AUTO: 7.74 THOUSAND/UL (ref 4.31–10.16)

## 2025-05-12 PROCEDURE — 85027 COMPLETE CBC AUTOMATED: CPT

## 2025-05-12 PROCEDURE — 85007 BL SMEAR W/DIFF WBC COUNT: CPT

## 2025-05-12 PROCEDURE — 36415 COLL VENOUS BLD VENIPUNCTURE: CPT

## 2025-05-12 NOTE — PROGRESS NOTES
Called to go over pre procedure information. Patient to arrive at 0930, has a ride to and from, and NPO after midnight the night before. Consult complete.

## 2025-05-13 DIAGNOSIS — D46.Z MDS (MYELODYSPLASTIC SYNDROME), HIGH GRADE (HCC): Primary | ICD-10-CM

## 2025-05-15 ENCOUNTER — RESULTS FOLLOW-UP (OUTPATIENT)
Dept: HEMATOLOGY ONCOLOGY | Facility: CLINIC | Age: 75
End: 2025-05-15

## 2025-05-15 ENCOUNTER — APPOINTMENT (OUTPATIENT)
Dept: LAB | Facility: HOSPITAL | Age: 75
End: 2025-05-15
Attending: INTERNAL MEDICINE
Payer: MEDICARE

## 2025-05-15 DIAGNOSIS — D69.6 THROMBOCYTOPENIA (HCC): ICD-10-CM

## 2025-05-15 DIAGNOSIS — D64.9 ACUTE ANEMIA: ICD-10-CM

## 2025-05-15 LAB
ABO GROUP BLD: NORMAL
ANISOCYTOSIS BLD QL SMEAR: PRESENT
BASO STIPL BLD QL SMEAR: PRESENT
BASOPHILS # BLD MANUAL: 0 THOUSAND/UL (ref 0–0.1)
BASOPHILS NFR MAR MANUAL: 0 % (ref 0–1)
BLD GP AB SCN SERPL QL: NEGATIVE
EOSINOPHIL # BLD MANUAL: 0.22 THOUSAND/UL (ref 0–0.4)
EOSINOPHIL NFR BLD MANUAL: 3 % (ref 0–6)
ERYTHROCYTE [DISTWIDTH] IN BLOOD BY AUTOMATED COUNT: 24.5 % (ref 11.6–15.1)
HCT VFR BLD AUTO: 22.2 % (ref 36.5–49.3)
HGB BLD-MCNC: 6.6 G/DL (ref 12–17)
LYMPHOCYTES # BLD AUTO: 1.11 THOUSAND/UL (ref 0.6–4.47)
LYMPHOCYTES # BLD AUTO: 15 % (ref 14–44)
MACROCYTES BLD QL AUTO: PRESENT
MCH RBC QN AUTO: 34.9 PG (ref 26.8–34.3)
MCHC RBC AUTO-ENTMCNC: 29.7 G/DL (ref 31.4–37.4)
MCV RBC AUTO: 118 FL (ref 82–98)
METAMYELOCYTE ABSOLUTE CT: 0.15 THOUSAND/UL (ref 0–0.1)
METAMYELOCYTES NFR BLD MANUAL: 2 % (ref 0–1)
MICROCYTES BLD QL AUTO: PRESENT
MONOCYTES # BLD AUTO: 0 THOUSAND/UL (ref 0–1.22)
MONOCYTES NFR BLD: 0 % (ref 4–12)
MYELOCYTE ABSOLUTE CT: 0.07 THOUSAND/UL (ref 0–0.1)
MYELOCYTES NFR BLD MANUAL: 1 % (ref 0–1)
NEUTROPHILS # BLD MANUAL: 5.85 THOUSAND/UL (ref 1.85–7.62)
NEUTS SEG NFR BLD AUTO: 79 % (ref 43–75)
PLATELET # BLD AUTO: 38 THOUSANDS/UL (ref 149–390)
PLATELET BLD QL SMEAR: ABNORMAL
PMV BLD AUTO: 13.2 FL (ref 8.9–12.7)
POIKILOCYTOSIS BLD QL SMEAR: PRESENT
POLYCHROMASIA BLD QL SMEAR: PRESENT
RBC # BLD AUTO: 1.89 MILLION/UL (ref 3.88–5.62)
RBC MORPH BLD: PRESENT
RH BLD: NEGATIVE
SPECIMEN EXPIRATION DATE: NORMAL
WBC # BLD AUTO: 7.4 THOUSAND/UL (ref 4.31–10.16)

## 2025-05-15 PROCEDURE — 85027 COMPLETE CBC AUTOMATED: CPT

## 2025-05-15 PROCEDURE — 86900 BLOOD TYPING SEROLOGIC ABO: CPT

## 2025-05-15 PROCEDURE — 36415 COLL VENOUS BLD VENIPUNCTURE: CPT

## 2025-05-15 PROCEDURE — 86850 RBC ANTIBODY SCREEN: CPT

## 2025-05-15 PROCEDURE — 86901 BLOOD TYPING SEROLOGIC RH(D): CPT

## 2025-05-15 PROCEDURE — 85007 BL SMEAR W/DIFF WBC COUNT: CPT

## 2025-05-15 PROCEDURE — 86923 COMPATIBILITY TEST ELECTRIC: CPT

## 2025-05-15 RX ORDER — SODIUM CHLORIDE 9 MG/ML
20 INJECTION, SOLUTION INTRAVENOUS ONCE
Status: CANCELLED | OUTPATIENT
Start: 2025-05-22

## 2025-05-15 NOTE — TELEPHONE ENCOUNTER
----- Message from Navarro Woods MD sent at 5/15/2025  3:02 PM EDT -----  Hemoglobin 6.6.      Phoned [facility] infusion.  Transfusion scheduled for 5/16 at 1230.    Phoned patient.  Aware and agreeable for transfusion appt.

## 2025-05-15 NOTE — PROGRESS NOTES
Patient added on for blood transfusion 5/16 @ 1230. Prepare order released and lab notified. Patient had blood bank hold tube collected at [facility] today.

## 2025-05-16 ENCOUNTER — HOSPITAL ENCOUNTER (OUTPATIENT)
Dept: INFUSION CENTER | Facility: HOSPITAL | Age: 75
End: 2025-05-16
Payer: MEDICARE

## 2025-05-16 VITALS
DIASTOLIC BLOOD PRESSURE: 65 MMHG | HEART RATE: 65 BPM | RESPIRATION RATE: 13 BRPM | TEMPERATURE: 96.7 F | SYSTOLIC BLOOD PRESSURE: 128 MMHG | OXYGEN SATURATION: 98 %

## 2025-05-16 DIAGNOSIS — D64.9 ACUTE ANEMIA: Primary | ICD-10-CM

## 2025-05-16 PROCEDURE — P9040 RBC LEUKOREDUCED IRRADIATED: HCPCS

## 2025-05-16 RX ORDER — SODIUM CHLORIDE 9 MG/ML
20 INJECTION, SOLUTION INTRAVENOUS ONCE
Status: COMPLETED | OUTPATIENT
Start: 2025-05-16 | End: 2025-05-16

## 2025-05-16 RX ADMIN — SODIUM CHLORIDE 20 ML/HR: 0.9 INJECTION, SOLUTION INTRAVENOUS at 12:35

## 2025-05-16 NOTE — PROGRESS NOTES
Rikki Reyes  tolerated treatment well with no complications.      Rikki Reyes is aware of future appts    AVS printed and given to Rikki Reyes:  No (Declined by Rikki Reyes)

## 2025-05-19 ENCOUNTER — APPOINTMENT (OUTPATIENT)
Dept: LAB | Facility: HOSPITAL | Age: 75
End: 2025-05-19
Payer: MEDICARE

## 2025-05-19 DIAGNOSIS — D46.Z MDS (MYELODYSPLASTIC SYNDROME), HIGH GRADE (HCC): ICD-10-CM

## 2025-05-19 DIAGNOSIS — D69.6 THROMBOCYTOPENIA (HCC): ICD-10-CM

## 2025-05-19 DIAGNOSIS — D64.9 ACUTE ANEMIA: ICD-10-CM

## 2025-05-19 LAB
ANISOCYTOSIS BLD QL SMEAR: PRESENT
BASO STIPL BLD QL SMEAR: PRESENT
BASOPHILS # BLD MANUAL: 0 THOUSAND/UL (ref 0–0.1)
BASOPHILS NFR MAR MANUAL: 0 % (ref 0–1)
EOSINOPHIL # BLD MANUAL: 0.29 THOUSAND/UL (ref 0–0.4)
EOSINOPHIL NFR BLD MANUAL: 4 % (ref 0–6)
ERYTHROCYTE [DISTWIDTH] IN BLOOD BY AUTOMATED COUNT: 24.8 % (ref 11.6–15.1)
HCT VFR BLD AUTO: 21.7 % (ref 36.5–49.3)
HGB BLD-MCNC: 6.6 G/DL (ref 12–17)
LYMPHOCYTES # BLD AUTO: 1.14 THOUSAND/UL (ref 0.6–4.47)
LYMPHOCYTES # BLD AUTO: 16 % (ref 14–44)
MACROCYTES BLD QL AUTO: PRESENT
MCH RBC QN AUTO: 33.8 PG (ref 26.8–34.3)
MCHC RBC AUTO-ENTMCNC: 30.4 G/DL (ref 31.4–37.4)
MCV RBC AUTO: 111 FL (ref 82–98)
MONOCYTES # BLD AUTO: 0 THOUSAND/UL (ref 0–1.22)
MONOCYTES NFR BLD: 0 % (ref 4–12)
MYELOCYTE ABSOLUTE CT: 0.21 THOUSAND/UL (ref 0–0.1)
MYELOCYTES NFR BLD MANUAL: 3 % (ref 0–1)
NEUTROPHILS # BLD MANUAL: 5.5 THOUSAND/UL (ref 1.85–7.62)
NEUTS BAND NFR BLD MANUAL: 2 % (ref 0–8)
NEUTS SEG NFR BLD AUTO: 75 % (ref 43–75)
PLATELET # BLD AUTO: 30 THOUSANDS/UL (ref 149–390)
PLATELET BLD QL SMEAR: ABNORMAL
PMV BLD AUTO: 13.1 FL (ref 8.9–12.7)
POIKILOCYTOSIS BLD QL SMEAR: PRESENT
RBC # BLD AUTO: 1.95 MILLION/UL (ref 3.88–5.62)
RBC MORPH BLD: PRESENT
WBC # BLD AUTO: 7.14 THOUSAND/UL (ref 4.31–10.16)

## 2025-05-19 PROCEDURE — 85007 BL SMEAR W/DIFF WBC COUNT: CPT

## 2025-05-19 PROCEDURE — 86900 BLOOD TYPING SEROLOGIC ABO: CPT

## 2025-05-19 PROCEDURE — 36415 COLL VENOUS BLD VENIPUNCTURE: CPT

## 2025-05-19 PROCEDURE — 85027 COMPLETE CBC AUTOMATED: CPT

## 2025-05-19 PROCEDURE — 86850 RBC ANTIBODY SCREEN: CPT

## 2025-05-19 PROCEDURE — 86901 BLOOD TYPING SEROLOGIC RH(D): CPT

## 2025-05-20 ENCOUNTER — OFFICE VISIT (OUTPATIENT)
Age: 75
End: 2025-05-20
Payer: MEDICARE

## 2025-05-20 ENCOUNTER — TELEPHONE (OUTPATIENT)
Dept: HEMATOLOGY ONCOLOGY | Facility: CLINIC | Age: 75
End: 2025-05-20

## 2025-05-20 ENCOUNTER — PATIENT MESSAGE (OUTPATIENT)
Dept: HEMATOLOGY ONCOLOGY | Facility: CLINIC | Age: 75
End: 2025-05-20

## 2025-05-20 VITALS — HEIGHT: 71 IN | BODY MASS INDEX: 28.28 KG/M2 | WEIGHT: 202 LBS

## 2025-05-20 DIAGNOSIS — R11.0 NAUSEA: ICD-10-CM

## 2025-05-20 DIAGNOSIS — S60.10XA SUBUNGUAL HEMATOMA OF DIGIT OF HAND, INITIAL ENCOUNTER: ICD-10-CM

## 2025-05-20 DIAGNOSIS — M79.89 LEG SWELLING: ICD-10-CM

## 2025-05-20 DIAGNOSIS — R23.3 PETECHIAE: Primary | ICD-10-CM

## 2025-05-20 LAB
ABO GROUP BLD: NORMAL
BLD GP AB SCN SERPL QL: NEGATIVE
RH BLD: NEGATIVE
SPECIMEN EXPIRATION DATE: NORMAL

## 2025-05-20 PROCEDURE — 99203 OFFICE O/P NEW LOW 30 MIN: CPT

## 2025-05-20 RX ORDER — ONDANSETRON 8 MG/1
8 TABLET, FILM COATED ORAL EVERY 8 HOURS PRN
Qty: 45 TABLET | Refills: 1 | Status: SHIPPED | OUTPATIENT
Start: 2025-05-20

## 2025-05-20 RX ORDER — SODIUM CHLORIDE 9 MG/ML
20 INJECTION, SOLUTION INTRAVENOUS ONCE
OUTPATIENT
Start: 2025-06-02

## 2025-05-20 RX ORDER — SODIUM CHLORIDE 9 MG/ML
20 INJECTION, SOLUTION INTRAVENOUS ONCE
OUTPATIENT
Start: 2025-06-05

## 2025-05-20 RX ORDER — BENZOCAINE/MENTHOL 6 MG-10 MG
LOZENGE MUCOUS MEMBRANE 4 TIMES DAILY PRN
Qty: 14 G | Refills: 0 | Status: SHIPPED | OUTPATIENT
Start: 2025-05-20

## 2025-05-20 RX ORDER — SODIUM CHLORIDE 9 MG/ML
20 INJECTION, SOLUTION INTRAVENOUS ONCE
OUTPATIENT
Start: 2025-05-29

## 2025-05-20 RX ORDER — SODIUM CHLORIDE 9 MG/ML
20 INJECTION, SOLUTION INTRAVENOUS ONCE
OUTPATIENT
Start: 2025-06-06

## 2025-05-20 RX ORDER — SODIUM CHLORIDE 9 MG/ML
20 INJECTION, SOLUTION INTRAVENOUS ONCE
OUTPATIENT
Start: 2025-05-28

## 2025-05-20 RX ORDER — SODIUM CHLORIDE 9 MG/ML
20 INJECTION, SOLUTION INTRAVENOUS ONCE
OUTPATIENT
Start: 2025-05-30

## 2025-05-20 NOTE — TELEPHONE ENCOUNTER
Called pt and advised of transfusion appt made for 5/22 at 0800 at [facility] infusion. Pt agreeable and voiced understanding.

## 2025-05-20 NOTE — PROGRESS NOTES
"Name: Rikki Reyes      : 1950      MRN: [id number]  Encounter Provider: Kishore Garcia DPM  Encounter Date: 2025   Encounter department: [facility] PODIATRY [facility] [address]  :  Assessment & Plan  Petechiae    Orders:    hydrocortisone 1 % cream; Apply topically 4 (four) times a day as needed for irritation or rash    Leg swelling         Subungual hematoma of digit of hand, initial encounter         - Petechiae stable, with no acute clinical signs of infection.  Recommend over-the-counter calamine lotion and over-the-counter Benadryl as needed for symptoms.   -No acute need for nail procedure, subungual hematoma stable.  -F/u 3 weeks    History of Present Illness   HPI  Rikki Reyes is a 75 y.o. male who presents with foot swelling and red discoloration.  Ongoing for the past couple weeks.  Patient tried Benadryl 1 day and thinks he got some relief.  Reports itchiness at area of rash.  Additionally reports bilateral lower extremity swelling.    History obtained from: patient    Review of Systems  Medications Ordered Prior to Encounter[1]      Objective   Ht 5' 11\" (1.803 m)   Wt 91.6 kg (202 lb)   BMI 28.17 kg/m²      Physical Exam    Cardiovascular:      Pulses:           Dorsalis pedis pulses are 2+ on the right side and 2+ on the left side.        Posterior tibial pulses are 2+ on the right side and 2+ on the left side.   Feet:      Right foot:      Protective Sensation: 10 sites tested.  10 sites sensed.      Skin integrity: Erythema present.      Left foot:      Protective Sensation: 10 sites tested.  10 sites sensed.      Skin integrity: Erythema present.             Bilateral LE petechiae           [1]   Current Outpatient Medications on File Prior to Visit   Medication Sig Dispense Refill    acyclovir (ZOVIRAX) 400 MG tablet Take 1 tablet (400 mg total) by mouth 2 (two) times a day 60 tablet 11    carvedilol (COREG) 3.125 mg tablet Take 1 tablet (3.125 mg total) by mouth 2 " (two) times a day with meals 180 tablet 3    Cholecalciferol (D3-1000) 25 MCG (1000 UT) capsule Take 1,000 Units by mouth in the morning.      levofloxacin (LEVAQUIN) 500 mg tablet Take 1 tablet (500 mg total) by mouth every 24 hours 30 tablet 11    omeprazole (PriLOSEC) 40 MG capsule TAKE 1 CAPSULE (40 MG TOTAL) BY MOUTH DAILY. 90 capsule 1    ondansetron (ZOFRAN) 4 mg tablet Take 1 tablet (4 mg total) by mouth every 8 (eight) hours as needed for nausea or vomiting 45 tablet 0    posaconazole (NOXAFIL) 100 MG delayed-release tablet Take 3 tablets (300 mg total) by mouth daily 90 tablet 11    Rosuvastatin Calcium 20 MG CPSP Take 1 capsule by mouth in the morning 90 capsule 3    tamsulosin (FLOMAX) 0.4 mg       Venetoclax 100 MG TABS Take 1 tablet (100 mg total) by mouth daily For 14 days starting with Cycle 2 14 tablet 5    Venetoclax 100 MG TABS Take 2 tablets (200 mg total) by mouth daily For 14 days with Cycle 1 28 tablet 0    Aspirin 81 MG CAPS Take 1 capsule by mouth in the morning      Cyanocobalamin (B-12) 1000 MCG TABS Place 1 tablet under the tongue in the morning (Patient not taking: Reported on 4/8/2025)       Current Facility-Administered Medications on File Prior to Visit   Medication Dose Route Frequency Provider Last Rate Last Admin    [DISCONTINUED] alteplase (CATHFLO) injection 2 mg  2 mg Intracatheter Q1MIN PRN Navarro Woods MD

## 2025-05-22 ENCOUNTER — HOSPITAL ENCOUNTER (OUTPATIENT)
Dept: INFUSION CENTER | Facility: HOSPITAL | Age: 75
End: 2025-05-22
Payer: MEDICARE

## 2025-05-22 ENCOUNTER — TELEPHONE (OUTPATIENT)
Dept: HEMATOLOGY ONCOLOGY | Facility: CLINIC | Age: 75
End: 2025-05-22

## 2025-05-22 VITALS
DIASTOLIC BLOOD PRESSURE: 57 MMHG | OXYGEN SATURATION: 96 % | RESPIRATION RATE: 16 BRPM | TEMPERATURE: 97.9 F | SYSTOLIC BLOOD PRESSURE: 104 MMHG | HEART RATE: 84 BPM

## 2025-05-22 DIAGNOSIS — D64.9 ACUTE ANEMIA: Primary | ICD-10-CM

## 2025-05-22 PROCEDURE — P9040 RBC LEUKOREDUCED IRRADIATED: HCPCS

## 2025-05-22 PROCEDURE — 36430 TRANSFUSION BLD/BLD COMPNT: CPT

## 2025-05-22 RX ORDER — SODIUM CHLORIDE 9 MG/ML
20 INJECTION, SOLUTION INTRAVENOUS ONCE
OUTPATIENT
Start: 2025-05-28

## 2025-05-22 RX ORDER — SODIUM CHLORIDE 9 MG/ML
20 INJECTION, SOLUTION INTRAVENOUS ONCE
Status: COMPLETED | OUTPATIENT
Start: 2025-05-22 | End: 2025-05-22

## 2025-05-22 RX ADMIN — SODIUM CHLORIDE 20 ML/HR: 9 INJECTION, SOLUTION INTRAVENOUS at 08:30

## 2025-05-22 NOTE — TELEPHONE ENCOUNTER
Left message for patient stating that we completed his daughters FMLA paperwork. Patient had stated that he wanted to pick it up at the [place] office. Will leave in envelope at  and scan into media.

## 2025-05-22 NOTE — PROGRESS NOTES
Rikki Reyes  tolerated  1 unit of PRBCS treatment well with no complications.      Rikki Reyes is aware of future appt on 5/27/25    AVS printed and given to Rikki Reyes:   No (Declined by Rikki Reyes)

## 2025-05-23 ENCOUNTER — TELEPHONE (OUTPATIENT)
Dept: HEMATOLOGY ONCOLOGY | Facility: CLINIC | Age: 75
End: 2025-05-23

## 2025-05-23 ENCOUNTER — HOSPITAL ENCOUNTER (OUTPATIENT)
Dept: INTERVENTIONAL RADIOLOGY/VASCULAR | Facility: HOSPITAL | Age: 75
End: 2025-05-23
Attending: INTERNAL MEDICINE
Payer: MEDICARE

## 2025-05-23 ENCOUNTER — HOSPITAL ENCOUNTER (EMERGENCY)
Facility: HOSPITAL | Age: 75
Discharge: HOME | End: 2025-05-23
Attending: EMERGENCY MEDICINE
Payer: MEDICARE

## 2025-05-23 ENCOUNTER — RESULTS FOLLOW-UP (OUTPATIENT)
Dept: HEMATOLOGY ONCOLOGY | Facility: CLINIC | Age: 75
End: 2025-05-23

## 2025-05-23 VITALS
BODY MASS INDEX: 28.28 KG/M2 | SYSTOLIC BLOOD PRESSURE: 118 MMHG | WEIGHT: 202 LBS | TEMPERATURE: 98.6 F | HEART RATE: 75 BPM | OXYGEN SATURATION: 96 % | RESPIRATION RATE: 18 BRPM | DIASTOLIC BLOOD PRESSURE: 59 MMHG | HEIGHT: 71 IN

## 2025-05-23 VITALS
TEMPERATURE: 98.9 F | DIASTOLIC BLOOD PRESSURE: 65 MMHG | HEIGHT: 71 IN | SYSTOLIC BLOOD PRESSURE: 126 MMHG | BODY MASS INDEX: 28.28 KG/M2 | HEART RATE: 74 BPM | OXYGEN SATURATION: 94 % | WEIGHT: 202 LBS | RESPIRATION RATE: 18 BRPM

## 2025-05-23 DIAGNOSIS — D46.Z MDS (MYELODYSPLASTIC SYNDROME), HIGH GRADE (HCC): Primary | ICD-10-CM

## 2025-05-23 DIAGNOSIS — D46.Z MDS (MYELODYSPLASTIC SYNDROME), HIGH GRADE (HCC): ICD-10-CM

## 2025-05-23 DIAGNOSIS — D64.9 ANEMIA: Primary | ICD-10-CM

## 2025-05-23 LAB
ABO GROUP BLD BPU: NORMAL
ABO GROUP BLD: NORMAL
ANISOCYTOSIS BLD QL SMEAR: PRESENT
ANISOCYTOSIS BLD QL SMEAR: PRESENT
BASO STIPL BLD QL SMEAR: PRESENT
BASOPHILS # BLD MANUAL: 0 THOUSAND/UL (ref 0–0.1)
BASOPHILS # BLD MANUAL: 0.06 THOUSAND/UL (ref 0–0.1)
BASOPHILS NFR MAR MANUAL: 0 % (ref 0–1)
BASOPHILS NFR MAR MANUAL: 1 % (ref 0–1)
BLD GP AB SCN SERPL QL: NEGATIVE
BPU ID: NORMAL
CROSSMATCH: NORMAL
DACRYOCYTES BLD QL SMEAR: PRESENT
EOSINOPHIL # BLD MANUAL: 0.06 THOUSAND/UL (ref 0–0.4)
EOSINOPHIL # BLD MANUAL: 0.3 THOUSAND/UL (ref 0–0.4)
EOSINOPHIL NFR BLD MANUAL: 1 % (ref 0–6)
EOSINOPHIL NFR BLD MANUAL: 5 % (ref 0–6)
ERYTHROCYTE [DISTWIDTH] IN BLOOD BY AUTOMATED COUNT: 24.3 % (ref 11.6–15.1)
ERYTHROCYTE [DISTWIDTH] IN BLOOD BY AUTOMATED COUNT: 24.4 % (ref 11.6–15.1)
GLUCOSE SERPL-MCNC: 142 MG/DL (ref 65–140)
HCT VFR BLD AUTO: 21 % (ref 36.5–49.3)
HCT VFR BLD AUTO: 21.6 % (ref 36.5–49.3)
HELMET CELLS BLD QL SMEAR: PRESENT
HGB BLD-MCNC: 6.5 G/DL (ref 12–17)
HGB BLD-MCNC: 6.6 G/DL (ref 12–17)
LYMPHOCYTES # BLD AUTO: 0.59 THOUSAND/UL (ref 0.6–4.47)
LYMPHOCYTES # BLD AUTO: 0.8 THOUSAND/UL (ref 0.6–4.47)
LYMPHOCYTES # BLD AUTO: 10 % (ref 14–44)
LYMPHOCYTES # BLD AUTO: 13 % (ref 14–44)
MACROCYTES BLD QL AUTO: PRESENT
MACROCYTES BLD QL AUTO: PRESENT
MCH RBC QN AUTO: 33.7 PG (ref 26.8–34.3)
MCH RBC QN AUTO: 33.9 PG (ref 26.8–34.3)
MCHC RBC AUTO-ENTMCNC: 30.6 G/DL (ref 31.4–37.4)
MCHC RBC AUTO-ENTMCNC: 31 G/DL (ref 31.4–37.4)
MCV RBC AUTO: 109 FL (ref 82–98)
MCV RBC AUTO: 110 FL (ref 82–98)
MONOCYTES # BLD AUTO: 0 THOUSAND/UL (ref 0–1.22)
MONOCYTES # BLD AUTO: 0.06 THOUSAND/UL (ref 0–1.22)
MONOCYTES NFR BLD: 0 % (ref 4–12)
MONOCYTES NFR BLD: 1 % (ref 4–12)
MYELOCYTE ABSOLUTE CT: 0.06 THOUSAND/UL (ref 0–0.1)
MYELOCYTE ABSOLUTE CT: 0.06 THOUSAND/UL (ref 0–0.1)
MYELOCYTES NFR BLD MANUAL: 1 % (ref 0–1)
MYELOCYTES NFR BLD MANUAL: 1 % (ref 0–1)
NEUTROPHILS # BLD MANUAL: 4.9 THOUSAND/UL (ref 1.85–7.62)
NEUTROPHILS # BLD MANUAL: 5.2 THOUSAND/UL (ref 1.85–7.62)
NEUTS BAND NFR BLD MANUAL: 6 % (ref 0–8)
NEUTS SEG NFR BLD AUTO: 77 % (ref 43–75)
NEUTS SEG NFR BLD AUTO: 84 % (ref 43–75)
PLATELET # BLD AUTO: 26 THOUSANDS/UL (ref 149–390)
PLATELET # BLD AUTO: 27 THOUSANDS/UL (ref 149–390)
PLATELET BLD QL SMEAR: ABNORMAL
PLATELET BLD QL SMEAR: ABNORMAL
PMV BLD AUTO: 12.8 FL (ref 8.9–12.7)
PMV BLD AUTO: 12.9 FL (ref 8.9–12.7)
POIKILOCYTOSIS BLD QL SMEAR: PRESENT
POIKILOCYTOSIS BLD QL SMEAR: PRESENT
POLYCHROMASIA BLD QL SMEAR: PRESENT
RBC # BLD AUTO: 1.92 MILLION/UL (ref 3.88–5.62)
RBC # BLD AUTO: 1.96 MILLION/UL (ref 3.88–5.62)
RBC MORPH BLD: PRESENT
RBC MORPH BLD: PRESENT
RH BLD: NEGATIVE
SPECIMEN EXPIRATION DATE: NORMAL
UNIT DISPENSE STATUS: NORMAL
UNIT PRODUCT CODE: NORMAL
UNIT PRODUCT VOLUME: 350 ML
UNIT RH: NORMAL
WBC # BLD AUTO: 5.9 THOUSAND/UL (ref 4.31–10.16)
WBC # BLD AUTO: 6.19 THOUSAND/UL (ref 4.31–10.16)

## 2025-05-23 PROCEDURE — C1788 PORT, INDWELLING, IMP: HCPCS

## 2025-05-23 PROCEDURE — 86900 BLOOD TYPING SEROLOGIC ABO: CPT | Performed by: EMERGENCY MEDICINE

## 2025-05-23 PROCEDURE — 36430 TRANSFUSION BLD/BLD COMPNT: CPT

## 2025-05-23 PROCEDURE — 76937 US GUIDE VASCULAR ACCESS: CPT | Performed by: RADIOLOGY

## 2025-05-23 PROCEDURE — 99283 EMERGENCY DEPT VISIT LOW MDM: CPT | Performed by: EMERGENCY MEDICINE

## 2025-05-23 PROCEDURE — P9040 RBC LEUKOREDUCED IRRADIATED: HCPCS

## 2025-05-23 PROCEDURE — 99283 EMERGENCY DEPT VISIT LOW MDM: CPT

## 2025-05-23 PROCEDURE — 36561 INSERT TUNNELED CV CATH: CPT | Performed by: RADIOLOGY

## 2025-05-23 PROCEDURE — 85007 BL SMEAR W/DIFF WBC COUNT: CPT

## 2025-05-23 PROCEDURE — 36415 COLL VENOUS BLD VENIPUNCTURE: CPT

## 2025-05-23 PROCEDURE — 85007 BL SMEAR W/DIFF WBC COUNT: CPT | Performed by: EMERGENCY MEDICINE

## 2025-05-23 PROCEDURE — 82948 REAGENT STRIP/BLOOD GLUCOSE: CPT

## 2025-05-23 PROCEDURE — 85027 COMPLETE CBC AUTOMATED: CPT | Performed by: EMERGENCY MEDICINE

## 2025-05-23 PROCEDURE — 86901 BLOOD TYPING SEROLOGIC RH(D): CPT | Performed by: EMERGENCY MEDICINE

## 2025-05-23 PROCEDURE — 99153 MOD SED SAME PHYS/QHP EA: CPT

## 2025-05-23 PROCEDURE — 85027 COMPLETE CBC AUTOMATED: CPT

## 2025-05-23 PROCEDURE — 76937 US GUIDE VASCULAR ACCESS: CPT

## 2025-05-23 PROCEDURE — 86850 RBC ANTIBODY SCREEN: CPT | Performed by: EMERGENCY MEDICINE

## 2025-05-23 PROCEDURE — 77001 FLUOROGUIDE FOR VEIN DEVICE: CPT | Performed by: RADIOLOGY

## 2025-05-23 PROCEDURE — 77001 FLUOROGUIDE FOR VEIN DEVICE: CPT

## 2025-05-23 PROCEDURE — 36561 INSERT TUNNELED CV CATH: CPT

## 2025-05-23 PROCEDURE — 75860 VEIN X-RAY NECK: CPT

## 2025-05-23 PROCEDURE — 99152 MOD SED SAME PHYS/QHP 5/>YRS: CPT | Performed by: RADIOLOGY

## 2025-05-23 PROCEDURE — 99152 MOD SED SAME PHYS/QHP 5/>YRS: CPT

## 2025-05-23 PROCEDURE — 86923 COMPATIBILITY TEST ELECTRIC: CPT

## 2025-05-23 RX ORDER — LIDOCAINE WITH 8.4% SOD BICARB 0.9%(10ML)
SYRINGE (ML) INJECTION AS NEEDED
Status: COMPLETED | OUTPATIENT
Start: 2025-05-23 | End: 2025-05-23

## 2025-05-23 RX ORDER — LIDOCAINE HYDROCHLORIDE AND EPINEPHRINE 10; 10 MG/ML; UG/ML
INJECTION, SOLUTION INFILTRATION; PERINEURAL AS NEEDED
Status: COMPLETED | OUTPATIENT
Start: 2025-05-23 | End: 2025-05-23

## 2025-05-23 RX ORDER — POSACONAZOLE 100 MG/1
300 TABLET, DELAYED RELEASE ORAL DAILY
Qty: 90 TABLET | Refills: 11 | OUTPATIENT
Start: 2025-05-23

## 2025-05-23 RX ORDER — FENTANYL CITRATE 50 UG/ML
INJECTION, SOLUTION INTRAMUSCULAR; INTRAVENOUS AS NEEDED
Status: COMPLETED | OUTPATIENT
Start: 2025-05-23 | End: 2025-05-23

## 2025-05-23 RX ORDER — SODIUM CHLORIDE 9 MG/ML
30 INJECTION, SOLUTION INTRAVENOUS CONTINUOUS
Status: DISPENSED | OUTPATIENT
Start: 2025-05-23

## 2025-05-23 RX ORDER — CEFAZOLIN SODIUM 2 G/50ML
2000 SOLUTION INTRAVENOUS ONCE
Status: COMPLETED | OUTPATIENT
Start: 2025-05-23 | End: 2025-05-23

## 2025-05-23 RX ORDER — MIDAZOLAM HYDROCHLORIDE 2 MG/2ML
INJECTION, SOLUTION INTRAMUSCULAR; INTRAVENOUS AS NEEDED
Status: COMPLETED | OUTPATIENT
Start: 2025-05-23 | End: 2025-05-23

## 2025-05-23 RX ADMIN — CEFAZOLIN SODIUM 2000 MG: 2 SOLUTION INTRAVENOUS at 10:18

## 2025-05-23 RX ADMIN — MIDAZOLAM HYDROCHLORIDE 1 MG: 1 INJECTION, SOLUTION INTRAMUSCULAR; INTRAVENOUS at 10:53

## 2025-05-23 RX ADMIN — SODIUM CHLORIDE 30 ML/HR: 0.9 INJECTION, SOLUTION INTRAVENOUS at 10:18

## 2025-05-23 RX ADMIN — MIDAZOLAM HYDROCHLORIDE 1 MG: 1 INJECTION, SOLUTION INTRAMUSCULAR; INTRAVENOUS at 11:14

## 2025-05-23 RX ADMIN — LIDOCAINE HYDROCHLORIDE,EPINEPHRINE BITARTRATE 10 ML: 10; .01 INJECTION, SOLUTION INFILTRATION; PERINEURAL at 11:28

## 2025-05-23 RX ADMIN — MIDAZOLAM HYDROCHLORIDE 1 MG: 1 INJECTION, SOLUTION INTRAMUSCULAR; INTRAVENOUS at 11:28

## 2025-05-23 RX ADMIN — FENTANYL CITRATE 50 MCG: 50 INJECTION, SOLUTION INTRAMUSCULAR; INTRAVENOUS at 11:11

## 2025-05-23 RX ADMIN — FENTANYL CITRATE 50 MCG: 50 INJECTION, SOLUTION INTRAMUSCULAR; INTRAVENOUS at 10:53

## 2025-05-23 RX ADMIN — FENTANYL CITRATE 50 MCG: 50 INJECTION, SOLUTION INTRAMUSCULAR; INTRAVENOUS at 11:14

## 2025-05-23 RX ADMIN — FENTANYL CITRATE 50 MCG: 50 INJECTION, SOLUTION INTRAMUSCULAR; INTRAVENOUS at 11:28

## 2025-05-23 RX ADMIN — IOHEXOL 10 ML: 350 INJECTION, SOLUTION INTRAVENOUS at 11:30

## 2025-05-23 RX ADMIN — MIDAZOLAM HYDROCHLORIDE 1 MG: 1 INJECTION, SOLUTION INTRAMUSCULAR; INTRAVENOUS at 11:10

## 2025-05-23 RX ADMIN — Medication 10 ML: at 11:18

## 2025-05-23 NOTE — DISCHARGE INSTRUCTIONS
[facility]  Interventional Radiology  [phone]    Implanted Venous Access Port     WHAT YOU NEED TO KNOW:   An implanted venous access port is a device used to give treatments and take blood. It may also be called a central venous access device (CVAD). The port is a small container that is placed under your skin, usually in your upper chest. The port is attached to a catheter that enters a large vein.   DISCHARGE INSTRUCTIONS:   Resume your normal diet. Small sips of flat soda will help with mild nausea.  Prevent an infection:   Wash your hands often.  Use soap and water. Clean your hands before and after you care for your port. Remind everyone who cares for your port to wash their hands.   Check your skin for infection every day.  Look for redness, swelling, or fluid oozing from the port site.  Care for your port:   1. You may shower beginning 48 hours after procedure.     2.  Leave glue in place.    3. It is normal for some bruising to occur.    4. Use Tylenol for pain.    5. Limit use of arm on the side that your port was placed. Lift nothing heavier than 5 pounds for 1 week, and then gradually increase activity as tolerated.    6. DO NOT apply ointment, lotion or cream to port site until incision is healed. Allow glue to fall off. DO NOT attempt to peel glue from skin even it it begins to flake.     7. After the port incision is healed you may swim, bathe.  Notify the Interventional Radiologist if you have any of the followin. Fever above 101 F    2. Increased redness or swelling after 1st day.     3. Increased pain after 1st day.    4. Any sign of infection (drainage from port site, skin separation, hot to touch).    5. Persistent nausea or vomiting.    Contact Interventional Radiology at [phone] ([place] PATIENTS: Contact Interventional Radiology at [phone]) ([place] PATIENTS: Contact Interventional Radiology at [phone]).    Procedural Sedation   WHAT YOU  NEED TO KNOW:   Procedural sedation is medicine used during procedures to help you feel relaxed and calm. You will remember little to none of the procedure. After sedation you may feel tired, weak, or unsteady on your feet. You may also have trouble concentrating or short-term memory loss. These symptoms should go away in 24 hours or less.   DISCHARGE INSTRUCTIONS:   Call 911 or have someone else call for any of the following:   You have sudden trouble breathing.     You cannot be woken.     Contact Interventional Radiology at [phone]   [place] PATIENTS: Contact Interventional Radiology at [phone] [place] PATIENTS: Contact Interventional Radiology at [phone]) if any of the following occur:      You have a severe headache or dizziness.     Your heart is beating faster than usual.    You have a fever or chills.     Your skin is itchy, swollen, or you have a rash.     You have nausea or are vomiting for more than 8 hours after the procedure.      You have questions or concerns about your condition or care.  Self-care:   Have someone stay with you for 24 hours. This person can drive you to errands and help you do things around the house. This person can also watch for problems.      Rest and do quiet activities for 24 hours. Do not exercise, ride a bike, or play sports. Stand up slowly to prevent dizziness and falls. Take short walks around the house with another person. Slowly return to your usual activities the next day.      Do not drive or use dangerous machines or tools for 24 hours. You may injure yourself or others. Examples include a lawnmower, saw, or drill. Do not return to work for 24 hours if you use dangerous machines or tools for work.      Do not make important decisions for 24 hours. For example, do not sign important papers or invest money.      Drink liquids as directed. Liquids help flush the sedation medicine out of your body. Ask how much liquid to drink each day and which liquids are  best for you.      Eat small, frequent meals to prevent nausea and vomiting. Start with clear liquids such as juice or broth. If you do not vomit after clear liquids, you can eat your usual foods.      Do not drink alcohol or take medicines that make you drowsy. This includes medicines that help you sleep and anxiety medicines. Ask your healthcare provider if it is safe for you to take pain medicine.  Follow up with your healthcare provider as directed: Write down your questions so you remember to ask them during your visits.

## 2025-05-23 NOTE — TELEPHONE ENCOUNTER
Checked with infusion at [facility] and there is no availability for blood transfusion today. He has not completed a type and screen as well. Discussed with Dr. Woods and he advised patient to be seen in the ED for transfusion.    Called patient and left VM making him aware of the above information. I asked that he call back to confirm message received and to let us know which ED he would go to so that an ADT 21 could be placed.

## 2025-05-23 NOTE — TELEPHONE ENCOUNTER
----- Message from Navarro Woods MD sent at 5/23/2025 11:23 AM EDT -----  Patient need to have 1 unit of packed blood cells today.  ----- Message -----  From: Lab, Background User  Sent: 5/19/2025   1:06 PM EDT  To: Navarro Woods MD

## 2025-05-23 NOTE — H&P
"Interventional Radiology  History and Physical 5/23/2025     Rikki Reyes   1950   [id number]    Assessment/Plan:  Thrombocytopenia and myelodysplastic disorder.  Anemic.  For port placement.  Plan is ultrasound guidance for access.  Right chest wall port placement.  Epinephrine with the lidocaine to decrease bleeding.  Cautery as needed.    Problem List Items Addressed This Visit          Oncology    MDS (myelodysplastic syndrome), high grade (HCC)    Relevant Orders    IR port placement          Subjective:     Patient ID: Rikki Reyes is a 75 y.o. male.    History of Present Illness  MDS with pancytopenia.    Review of Systems      Past Medical History[1]     Past Surgical History[2]     Tobacco Use History[3]     Social History     Substance and Sexual Activity   Alcohol Use Not Currently        Social History     Substance and Sexual Activity   Drug Use Never        Allergies[4]    Current Medications[5]       Objective:    Vitals:    05/23/25 0951   BP: 123/58   Pulse: 77   Resp: 18   Temp: 98.5 °F (36.9 °C)   TempSrc: Temporal   SpO2: 94%   Weight: 91.6 kg (202 lb)   Height: 5' 11\" (1.803 m)        Physical Exam      Awake, alert and oriented.  Accompanied by his wife.  Seems to have a good understanding of what is going on.  Regular rate and rhythm  Normal respiratory excursion  No jugular venous distention, venous collaterals, dialysis catheter or pacemaker  The skin over the right chest is clean, dry and intact.    No results found for: \"BNP\"   Lab Results   Component Value Date    WBC 7.14 05/19/2025    HGB 6.6 (L) 05/19/2025    HCT 21.7 (L) 05/19/2025     (H) 05/19/2025    PLT 30 (L) 05/19/2025     Lab Results   Component Value Date    INR 1.31 (H) 04/09/2025    INR 1.29 (H) 03/21/2025    PROTIME 16.8 (H) 04/09/2025    PROTIME 16.3 (H) 03/21/2025     Lab Results   Component Value Date    PTT 35 (H) 04/09/2025         I have personally reviewed pertinent imaging and laboratory results. "     Code Status: No Order  Advance Directive and Living Will:      Power of :    POLST:      This text is generated with voice recognition software. There may be translation, syntax,  or grammatical errors. If you have any questions, please contact the dictating provider.        [1]   Past Medical History:  Diagnosis Date    Anemia     Coronary artery disease     Diabetes mellitus (HCC)     A1C normal.  Metformin discontinued    Hypertension    [2]   Past Surgical History:  Procedure Laterality Date    DENTAL IMPLANT      HEMANGIOMA EXCISION      throat    HEMORROIDECTOMY      IR BIOPSY BONE MARROW  2025   [3]   Social History  Tobacco Use   Smoking Status Former    Current packs/day: 0.00    Average packs/day: 1.5 packs/day for 20.0 years (30.0 ttl pk-yrs)    Types: Cigarettes    Start date: 1970    Quit date: 1990    Years since quittin.4   Smokeless Tobacco Never   [4] No Known Allergies  [5]   Current Outpatient Medications   Medication Sig Dispense Refill    carvedilol (COREG) 3.125 mg tablet Take 1 tablet (3.125 mg total) by mouth 2 (two) times a day with meals 180 tablet 3    Cholecalciferol (D3-1000) 25 MCG (1000 UT) capsule Take 1,000 Units by mouth in the morning.      omeprazole (PriLOSEC) 40 MG capsule TAKE 1 CAPSULE (40 MG TOTAL) BY MOUTH DAILY. 90 capsule 1    ondansetron (ZOFRAN) 8 mg tablet Take 1 tablet (8 mg total) by mouth every 8 (eight) hours as needed for nausea or vomiting 45 tablet 1    Rosuvastatin Calcium 20 MG CPSP Take 1 capsule by mouth in the morning 90 capsule 3    tamsulosin (FLOMAX) 0.4 mg       acyclovir (ZOVIRAX) 400 MG tablet Take 1 tablet (400 mg total) by mouth 2 (two) times a day 60 tablet 11    Aspirin 81 MG CAPS Take 1 capsule by mouth in the morning      Cyanocobalamin (B-12) 1000 MCG TABS Place 1 tablet under the tongue in the morning (Patient not taking: Reported on 2025)      hydrocortisone 1 % cream Apply topically 4 (four) times a day  as needed for irritation or rash 14 g 0    levofloxacin (LEVAQUIN) 500 mg tablet Take 1 tablet (500 mg total) by mouth every 24 hours 30 tablet 11    posaconazole (NOXAFIL) 100 MG delayed-release tablet Take 3 tablets (300 mg total) by mouth daily 90 tablet 11    Venetoclax 100 MG TABS Take 1 tablet (100 mg total) by mouth daily For 14 days starting with Cycle 2 14 tablet 5    Venetoclax 100 MG TABS Take 2 tablets (200 mg total) by mouth daily For 14 days with Cycle 1 28 tablet 0     Current Facility-Administered Medications   Medication Dose Route Frequency Provider Last Rate Last Admin    sodium chloride 0.9 % infusion  30 mL/hr Intravenous Continuous Jacek Oropzea MD 30 mL/hr at 05/23/25 1018 30 mL/hr at 05/23/25 1018     Facility-Administered Medications Ordered in Other Encounters   Medication Dose Route Frequency Provider Last Rate Last Admin    alteplase (CATHFLO) injection 2 mg  2 mg Intracatheter Q1MIN PRN Navarro Woods MD

## 2025-05-23 NOTE — ED PROVIDER NOTES
Time reflects when diagnosis was documented in both MDM as applicable and the Disposition within this note       Time User Action Codes Description Comment    5/23/2025  4:40 PM Will Johnson Add [D64.9] Anemia           ED Disposition       ED Disposition   Discharge    Condition   Stable    Date/Time   Fri May 23, 2025  4:16 PM    Comment   Rikki Eric discharge to home/self care.                   Assessment & Plan       Medical Decision Making  Patient with known history of MDS.  Has not started treatment yet.  Will start in the next 4 to 5 days.  No pain or other symptoms reported.  Did have his port placed today and port appears well and intact with no signs of tenderness, infection or drainage.  Patient with multiple trasnfusion in the past. Type and screen/CBC.     Amount and/or Complexity of Data Reviewed  Labs: ordered. Decision-making details documented in ED Course.        ED Course as of 05/23/25 2115   Fri May 23, 2025   1656 Discussed with patient and wife at bedside and they will wait here for the blood for now. Diet order placed.    1713 Platelet Count(!): 27  Appears to be baseline and follows with heme/onc       Medications - No data to display    ED Risk Strat Scores                    No data recorded        SBIRT 20yo+      Flowsheet Row Most Recent Value   Initial Alcohol Screen: US AUDIT-C     1. How often do you have a drink containing alcohol? 0 Filed at: 05/23/2025 1438   2. How many drinks containing alcohol do you have on a typical day you are drinking?  0 Filed at: 05/23/2025 1438   3a. Male UNDER 65: How often do you have five or more drinks on one occasion? 0 Filed at: 05/23/2025 1438   3b. FEMALE Any Age, or MALE 65+: How often do you have 4 or more drinks on one occassion? 0 Filed at: 05/23/2025 1438   Audit-C Score 0 Filed at: 05/23/2025 1438   TODD: How many times in the past year have you...    Used an illegal drug or used a prescription medication for non-medical  reasons? Never Filed at: 05/23/2025 1435                            History of Present Illness       Chief Complaint   Patient presents with    Abnormal Lab     Pt sent by hem/onc provider for 6.5 hemoglobin drawn today.        Past Medical History[1]   Past Surgical History[2]   Family History[3]   Social History[4]   E-Cigarette/Vaping    E-Cigarette Use Never User       E-Cigarette/Vaping Substances      I have reviewed and agree with the history as documented.     76 yo male with known hx of MDS and multiple prior transfusions. States feeling otherwise well and with no other issues at this time.           Review of Systems   All other systems reviewed and are negative.          Objective       ED Triage Vitals   Temperature Pulse Blood Pressure Respirations SpO2 Patient Position - Orthostatic VS   05/23/25 1434 05/23/25 1434 05/23/25 1434 05/23/25 1434 05/23/25 1434 05/23/25 2023   98.8 °F (37.1 °C) 71 (!) 109/49 18 96 % Sitting      Temp Source Heart Rate Source BP Location FiO2 (%) Pain Score    05/23/25 1434 05/23/25 2023 05/23/25 2023 -- 05/23/25 1434    Temporal Monitor Left arm  No Pain      Vitals      Date and Time Temp Pulse SpO2 Resp BP Pain Score FACES Pain Rating User   05/23/25 2110 99.3 °F (37.4 °C) 77 95 % 18 121/58 -- --    05/23/25 2105 99.8 °F (37.7 °C) 76 96 % 18 117/58 -- --    05/23/25 2100 99.8 °F (37.7 °C) 78 93 % 18 118/58 -- --    05/23/25 2050 99.6 °F (37.6 °C) 77 94 % 18 119/58 -- --    05/23/25 2045 99.6 °F (37.6 °C) 76 94 % 18 122/57 -- --    05/23/25 2030 99.5 °F (37.5 °C) 76 94 % 18 118/55 -- --    05/23/25 2023 98.8 °F (37.1 °C) -- 95 % 18 117/54 -- --    05/23/25 1622 -- 73 99 % 18 129/60 -- -- RTM   05/23/25 1434 98.8 °F (37.1 °C) 71 96 % 18 109/49 No Pain -- SANTOS            Physical Exam  General: VS reviewed  Appears in NAD  awake, alert.   Well-nourished, well-developed. Appears stated age.   Speaking normally in full sentences.   Head: Normocephalic,  atraumatic  Eyes: EOM-I. No diplopia.   No hyphema.   No subconjunctival hemorrhages.  Symmetrical lids.   ENT: Atraumatic external nose and ears.    MMM  No malocclusion. No stridor. Normal phonation. No drooling. Normal swallowing.   Neck: No JVD.  CV: No pallor noted  Lungs:   No tachypnea  No respiratory distress  MSK:   FROM spontaneously  Skin: Pale  Neuro: Awake, alert, GCS15, CN II-XII grossly intact.   Motor grossly intact.  Psychiatric/Behavioral: Appropriate mood and affect   Exam: deferred    Results Reviewed       Procedure Component Value Units Date/Time    Manual Differential(PHLEBS Do Not Order) [[id number]]  (Abnormal) Collected: 05/23/25 1515    Lab Status: Final result Specimen: Blood from Arm, Left Updated: 05/23/25 1726     Segmented % 77 %      Bands % 6 %      Lymphocytes % 10 %      Monocytes % 0 %      Eosinophils % 5 %      Basophils % 1 %      Myelocytes % 1 %      Absolute Neutrophils 4.90 Thousand/uL      Absolute Lymphocytes 0.59 Thousand/uL      Absolute Monocytes 0.00 Thousand/uL      Absolute Eosinophils 0.30 Thousand/uL      Absolute Basophils 0.06 Thousand/uL      Absolute Myelocytes 0.06 Thousand/uL      Total Counted --     RBC Morphology Present     Platelet Estimate Decreased     Anisocytosis Present     Basophilic Stippling Present     Helmet Cells Present     Macrocytes Present     Poikilocytes Present     Polychromasia Present     Tear Drop Cells Present    CBC and differential [[id number]]  (Abnormal) Collected: 05/23/25 1515    Lab Status: Final result Specimen: Blood from Arm, Left Updated: 05/23/25 1529     WBC 5.90 Thousand/uL      RBC 1.96 Million/uL      Hemoglobin 6.6 g/dL      Hematocrit 21.6 %       fL      MCH 33.7 pg      MCHC 30.6 g/dL      RDW 24.3 %      MPV 12.8 fL      Platelets 27 Thousands/uL     Narrative:      This is an appended report.  These results have been appended to a previously verified report.            No orders to display        Procedures    ED Medication and Procedure Management   Prior to Admission Medications   Prescriptions Last Dose Informant Patient Reported? Taking?   Aspirin 81 MG CAPS  Self Yes No   Sig: Take 1 capsule by mouth in the morning   Cholecalciferol (D3-1000) 25 MCG (1000 UT) capsule  Self Yes No   Sig: Take 1,000 Units by mouth in the morning.   Cyanocobalamin (B-12) 1000 MCG TABS  Self Yes No   Sig: Place 1 tablet under the tongue in the morning   Patient not taking: Reported on 4/8/2025   Rosuvastatin Calcium 20 MG CPSP  Self No No   Sig: Take 1 capsule by mouth in the morning   Venetoclax 100 MG TABS  Self No No   Sig: Take 1 tablet (100 mg total) by mouth daily For 14 days starting with Cycle 2   Venetoclax 100 MG TABS  Self No No   Sig: Take 2 tablets (200 mg total) by mouth daily For 14 days with Cycle 1   acyclovir (ZOVIRAX) 400 MG tablet  Self No No   Sig: Take 1 tablet (400 mg total) by mouth 2 (two) times a day   carvedilol (COREG) 3.125 mg tablet  Self No No   Sig: Take 1 tablet (3.125 mg total) by mouth 2 (two) times a day with meals   hydrocortisone 1 % cream   No No   Sig: Apply topically 4 (four) times a day as needed for irritation or rash   levofloxacin (LEVAQUIN) 500 mg tablet  Self No No   Sig: Take 1 tablet (500 mg total) by mouth every 24 hours   omeprazole (PriLOSEC) 40 MG capsule  Self No No   Sig: TAKE 1 CAPSULE (40 MG TOTAL) BY MOUTH DAILY.   ondansetron (ZOFRAN) 8 mg tablet   No No   Sig: Take 1 tablet (8 mg total) by mouth every 8 (eight) hours as needed for nausea or vomiting   posaconazole (NOXAFIL) 100 MG delayed-release tablet  Self No No   Sig: Take 3 tablets (300 mg total) by mouth daily   tamsulosin (FLOMAX) 0.4 mg  Self Yes No      Facility-Administered Medications: None     Patient's Medications   Discharge Prescriptions    No medications on file     No discharge procedures on file.  ED SEPSIS DOCUMENTATION   Time reflects when diagnosis was documented in both MDM as applicable  and the Disposition within this note       Time User Action Codes Description Comment    2025  4:40 PM Will Johnson Add [D64.9] Anemia                    [1]   Past Medical History:  Diagnosis Date    Anemia     Coronary artery disease     Diabetes mellitus (HCC)     A1C normal.  Metformin discontinued    Hypertension     MDS (myelodysplastic syndrome), high grade (HCC)    [2]   Past Surgical History:  Procedure Laterality Date    DENTAL IMPLANT      HEMANGIOMA EXCISION      throat    HEMORROIDECTOMY      IR BIOPSY BONE MARROW  2025    IR PORT PLACEMENT  2025   [3]   Family History  Problem Relation Name Age of Onset    Heart disease Father Gilbert     Heart disease Brother Tyrone    [4]   Social History  Tobacco Use    Smoking status: Former     Current packs/day: 0.00     Average packs/day: 1.5 packs/day for 20.0 years (30.0 ttl pk-yrs)     Types: Cigarettes     Start date: 1970     Quit date: 1990     Years since quittin.4    Smokeless tobacco: Never   Vaping Use    Vaping status: Never Used   Substance Use Topics    Alcohol use: Not Currently    Drug use: Never        Will Johnson DO  [phone]

## 2025-05-23 NOTE — PROCEDURES
Interventional Radiology Procedure Note    PATIENT NAME: Rikki Reyes  : 1950  MRN: [id number]     Pre-op Diagnosis:   1. MDS (myelodysplastic syndrome), high grade (HCC)      2.    For therapy and transfusions    Post-op Diagnosis:   1. MDS (myelodysplastic syndrome), high grade (HCC)      2.   Same    Procedure: Port placement using sonographic and fluoroscopic guidance    Surgeon:   Jacek Oropeza MD  Assistants:     No qualified resident was available, Resident is only observing    Estimated Blood Loss: < 5cc    Findings: Tip location right atrium. Ready to use    Specimens: None     Complications:  None     Anesthesia: Local and monitored intravenous conscious sedation.    Jacek Oropeza MD     Date: 2025  Time: 5:48 PM

## 2025-05-26 DIAGNOSIS — N40.0 BPH WITHOUT OBSTRUCTION/LOWER URINARY TRACT SYMPTOMS: Primary | ICD-10-CM

## 2025-05-27 ENCOUNTER — APPOINTMENT (OUTPATIENT)
Dept: LAB | Facility: HOSPITAL | Age: 75
End: 2025-05-27
Payer: MEDICARE

## 2025-05-27 ENCOUNTER — HOSPITAL ENCOUNTER (OUTPATIENT)
Dept: INFUSION CENTER | Facility: HOSPITAL | Age: 75
Discharge: HOME | End: 2025-05-27
Attending: INTERNAL MEDICINE
Payer: MEDICARE

## 2025-05-27 VITALS
HEIGHT: 71 IN | RESPIRATION RATE: 16 BRPM | SYSTOLIC BLOOD PRESSURE: 134 MMHG | OXYGEN SATURATION: 100 % | DIASTOLIC BLOOD PRESSURE: 61 MMHG | HEART RATE: 71 BPM | BODY MASS INDEX: 27.96 KG/M2 | WEIGHT: 199.74 LBS | TEMPERATURE: 97 F

## 2025-05-27 DIAGNOSIS — D46.Z MDS (MYELODYSPLASTIC SYNDROME), HIGH GRADE (HCC): Primary | ICD-10-CM

## 2025-05-27 DIAGNOSIS — D46.Z MDS (MYELODYSPLASTIC SYNDROME), HIGH GRADE (HCC): ICD-10-CM

## 2025-05-27 LAB
ALBUMIN SERPL BCG-MCNC: 3.4 G/DL (ref 3.5–5)
ALP SERPL-CCNC: 46 U/L (ref 34–104)
ALT SERPL W P-5'-P-CCNC: 4 U/L (ref 7–52)
ANION GAP SERPL CALCULATED.3IONS-SCNC: 6 MMOL/L (ref 4–13)
ANISOCYTOSIS BLD QL SMEAR: PRESENT
AST SERPL W P-5'-P-CCNC: 5 U/L (ref 13–39)
BASOPHILS # BLD MANUAL: 0 THOUSAND/UL (ref 0–0.1)
BASOPHILS NFR MAR MANUAL: 0 % (ref 0–1)
BILIRUB SERPL-MCNC: 1.95 MG/DL (ref 0.2–1)
BUN SERPL-MCNC: 8 MG/DL (ref 5–25)
CALCIUM ALBUM COR SERPL-MCNC: 9.4 MG/DL (ref 8.3–10.1)
CALCIUM SERPL-MCNC: 8.9 MG/DL (ref 8.4–10.2)
CHLORIDE SERPL-SCNC: 103 MMOL/L (ref 96–108)
CO2 SERPL-SCNC: 29 MMOL/L (ref 21–32)
CREAT SERPL-MCNC: 0.66 MG/DL (ref 0.6–1.3)
EOSINOPHIL # BLD MANUAL: 0.07 THOUSAND/UL (ref 0–0.4)
EOSINOPHIL NFR BLD MANUAL: 1 % (ref 0–6)
ERYTHROCYTE [DISTWIDTH] IN BLOOD BY AUTOMATED COUNT: 22.8 % (ref 11.6–15.1)
GFR SERPL CREATININE-BSD FRML MDRD: 95 ML/MIN/1.73SQ M
GLUCOSE P FAST SERPL-MCNC: 159 MG/DL (ref 65–99)
HCT VFR BLD AUTO: 24.6 % (ref 36.5–49.3)
HGB BLD-MCNC: 7.3 G/DL (ref 12–17)
LYMPHOCYTES # BLD AUTO: 1.09 THOUSAND/UL (ref 0.6–4.47)
LYMPHOCYTES # BLD AUTO: 15 % (ref 14–44)
MACROCYTES BLD QL AUTO: PRESENT
MCH RBC QN AUTO: 32.4 PG (ref 26.8–34.3)
MCHC RBC AUTO-ENTMCNC: 29.7 G/DL (ref 31.4–37.4)
MCV RBC AUTO: 109 FL (ref 82–98)
METAMYELOCYTE ABSOLUTE CT: 0.15 THOUSAND/UL (ref 0–0.1)
METAMYELOCYTES NFR BLD MANUAL: 2 % (ref 0–1)
MONOCYTES # BLD AUTO: 0 THOUSAND/UL (ref 0–1.22)
MONOCYTES NFR BLD: 0 % (ref 4–12)
MYELOCYTE ABSOLUTE CT: 0.07 THOUSAND/UL (ref 0–0.1)
MYELOCYTES NFR BLD MANUAL: 1 % (ref 0–1)
NEUTROPHILS # BLD MANUAL: 5.9 THOUSAND/UL (ref 1.85–7.62)
NEUTS BAND NFR BLD MANUAL: 4 % (ref 0–8)
NEUTS SEG NFR BLD AUTO: 77 % (ref 43–75)
PLATELET # BLD AUTO: 29 THOUSANDS/UL (ref 149–390)
PLATELET BLD QL SMEAR: ABNORMAL
PMV BLD AUTO: 13.5 FL (ref 8.9–12.7)
POIKILOCYTOSIS BLD QL SMEAR: PRESENT
POLYCHROMASIA BLD QL SMEAR: PRESENT
POTASSIUM SERPL-SCNC: 3.5 MMOL/L (ref 3.5–5.3)
PROT SERPL-MCNC: 6 G/DL (ref 6.4–8.4)
RBC # BLD AUTO: 2.25 MILLION/UL (ref 3.88–5.62)
RBC MORPH BLD: PRESENT
SODIUM SERPL-SCNC: 138 MMOL/L (ref 135–147)
WBC # BLD AUTO: 7.28 THOUSAND/UL (ref 4.31–10.16)

## 2025-05-27 PROCEDURE — 85007 BL SMEAR W/DIFF WBC COUNT: CPT

## 2025-05-27 PROCEDURE — 80053 COMPREHEN METABOLIC PANEL: CPT

## 2025-05-27 PROCEDURE — 96413 CHEMO IV INFUSION 1 HR: CPT

## 2025-05-27 PROCEDURE — 85027 COMPLETE CBC AUTOMATED: CPT

## 2025-05-27 PROCEDURE — 36415 COLL VENOUS BLD VENIPUNCTURE: CPT

## 2025-05-27 PROCEDURE — 96367 TX/PROPH/DG ADDL SEQ IV INF: CPT

## 2025-05-27 RX ORDER — SODIUM CHLORIDE 9 MG/ML
20 INJECTION, SOLUTION INTRAVENOUS ONCE
Status: COMPLETED | OUTPATIENT
Start: 2025-05-27 | End: 2025-05-27

## 2025-05-27 RX ADMIN — SODIUM CHLORIDE 20 ML/HR: 0.9 INJECTION, SOLUTION INTRAVENOUS at 13:00

## 2025-05-27 RX ADMIN — AZACITIDINE 159 MG: 100 INJECTION, POWDER, LYOPHILIZED, FOR SOLUTION INTRAVENOUS; SUBCUTANEOUS at 13:46

## 2025-05-27 RX ADMIN — DEXAMETHASONE SODIUM PHOSPHATE: 10 INJECTION, SOLUTION INTRAMUSCULAR; INTRAVENOUS at 13:01

## 2025-05-27 NOTE — PLAN OF CARE
Problem: INFECTION - ADULT  Goal: Absence or prevention of progression during hospitalization  Description: INTERVENTIONS:  - Assess and monitor for signs and symptoms of infection  - Monitor lab/diagnostic results  - Monitor all insertion sites, i.e. indwelling lines, tubes, and drains  - Monitor endotracheal if appropriate and nasal secretions for changes in amount and color  - North appropriate cooling/warming therapies per order  - Administer medications as ordered  - Instruct and encourage patient and family to use good hand hygiene technique  - Identify and instruct in appropriate isolation precautions for identified infection/condition  Outcome: Progressing

## 2025-05-27 NOTE — PROGRESS NOTES
Rikki Reyes  tolerated first vidaza treatment well with no complications.      Rikki Reyes is aware of future appt tomorrow    AVS printed and given to Rikki Reyes:   No (Declined by Rikki Reyes)

## 2025-05-28 ENCOUNTER — HOSPITAL ENCOUNTER (OUTPATIENT)
Dept: INFUSION CENTER | Facility: HOSPITAL | Age: 75
Discharge: HOME | End: 2025-05-28
Attending: INTERNAL MEDICINE
Payer: MEDICARE

## 2025-05-28 VITALS
DIASTOLIC BLOOD PRESSURE: 72 MMHG | RESPIRATION RATE: 18 BRPM | HEART RATE: 60 BPM | OXYGEN SATURATION: 97 % | BODY MASS INDEX: 27.96 KG/M2 | WEIGHT: 199.74 LBS | TEMPERATURE: 96.7 F | SYSTOLIC BLOOD PRESSURE: 123 MMHG | HEIGHT: 71 IN

## 2025-05-28 DIAGNOSIS — D46.Z MDS (MYELODYSPLASTIC SYNDROME), HIGH GRADE (HCC): Primary | ICD-10-CM

## 2025-05-28 LAB
ANISOCYTOSIS BLD QL SMEAR: PRESENT
BASOPHILS # BLD MANUAL: 0 THOUSAND/UL (ref 0–0.1)
BASOPHILS NFR MAR MANUAL: 0 % (ref 0–1)
EOSINOPHIL # BLD MANUAL: 0 THOUSAND/UL (ref 0–0.4)
EOSINOPHIL NFR BLD MANUAL: 0 % (ref 0–6)
ERYTHROCYTE [DISTWIDTH] IN BLOOD BY AUTOMATED COUNT: 23 % (ref 11.6–15.1)
HCT VFR BLD AUTO: 21.3 % (ref 36.5–49.3)
HGB BLD-MCNC: 6.4 G/DL (ref 12–17)
LYMPHOCYTES # BLD AUTO: 0.76 THOUSAND/UL (ref 0.6–4.47)
LYMPHOCYTES # BLD AUTO: 8 % (ref 14–44)
MACROCYTES BLD QL AUTO: PRESENT
MCH RBC QN AUTO: 33.3 PG (ref 26.8–34.3)
MCHC RBC AUTO-ENTMCNC: 30 G/DL (ref 31.4–37.4)
MCV RBC AUTO: 111 FL (ref 82–98)
METAMYELOCYTE ABSOLUTE CT: 0.19 THOUSAND/UL (ref 0–0.1)
METAMYELOCYTES NFR BLD MANUAL: 2 % (ref 0–1)
MONOCYTES # BLD AUTO: 0.19 THOUSAND/UL (ref 0–1.22)
MONOCYTES NFR BLD: 2 % (ref 4–12)
MYELOCYTE ABSOLUTE CT: 0.09 THOUSAND/UL (ref 0–0.1)
MYELOCYTES NFR BLD MANUAL: 1 % (ref 0–1)
NEUTROPHILS # BLD MANUAL: 8.24 THOUSAND/UL (ref 1.85–7.62)
NEUTS BAND NFR BLD MANUAL: 4 % (ref 0–8)
NEUTS SEG NFR BLD AUTO: 83 % (ref 43–75)
PLATELET # BLD AUTO: 23 THOUSANDS/UL (ref 149–390)
PLATELET BLD QL SMEAR: ABNORMAL
PMV BLD AUTO: 12.9 FL (ref 8.9–12.7)
POIKILOCYTOSIS BLD QL SMEAR: PRESENT
POLYCHROMASIA BLD QL SMEAR: PRESENT
RBC # BLD AUTO: 1.92 MILLION/UL (ref 3.88–5.62)
RBC MORPH BLD: PRESENT
WBC # BLD AUTO: 9.47 THOUSAND/UL (ref 4.31–10.16)

## 2025-05-28 PROCEDURE — 86901 BLOOD TYPING SEROLOGIC RH(D): CPT | Performed by: INTERNAL MEDICINE

## 2025-05-28 PROCEDURE — 86900 BLOOD TYPING SEROLOGIC ABO: CPT | Performed by: INTERNAL MEDICINE

## 2025-05-28 PROCEDURE — 96367 TX/PROPH/DG ADDL SEQ IV INF: CPT

## 2025-05-28 PROCEDURE — 96413 CHEMO IV INFUSION 1 HR: CPT

## 2025-05-28 PROCEDURE — 85007 BL SMEAR W/DIFF WBC COUNT: CPT | Performed by: INTERNAL MEDICINE

## 2025-05-28 PROCEDURE — 85027 COMPLETE CBC AUTOMATED: CPT | Performed by: INTERNAL MEDICINE

## 2025-05-28 PROCEDURE — 86850 RBC ANTIBODY SCREEN: CPT | Performed by: INTERNAL MEDICINE

## 2025-05-28 RX ORDER — SODIUM CHLORIDE 9 MG/ML
20 INJECTION, SOLUTION INTRAVENOUS ONCE
Status: COMPLETED | OUTPATIENT
Start: 2025-05-28 | End: 2025-05-28

## 2025-05-28 RX ORDER — TAMSULOSIN HYDROCHLORIDE 0.4 MG/1
0.4 CAPSULE ORAL
Qty: 100 CAPSULE | Refills: 3 | Status: SHIPPED | OUTPATIENT
Start: 2025-05-28

## 2025-05-28 RX ADMIN — DEXAMETHASONE SODIUM PHOSPHATE: 10 INJECTION, SOLUTION INTRAMUSCULAR; INTRAVENOUS at 13:19

## 2025-05-28 RX ADMIN — SODIUM CHLORIDE 20 ML/HR: 9 INJECTION, SOLUTION INTRAVENOUS at 13:19

## 2025-05-28 RX ADMIN — AZACITIDINE 159 MG: 100 INJECTION, POWDER, LYOPHILIZED, FOR SOLUTION INTRAVENOUS; SUBCUTANEOUS at 13:57

## 2025-05-28 NOTE — PROGRESS NOTES
Rikki Reyes  tolerated Vidaza treatment well with no complications.      Rikki Reyes is aware of future appt on 5/29 at 12:30PM.     AVS printed and given to Rikki Reyes: No (Declined by Rikki Reyes).

## 2025-05-29 ENCOUNTER — HOSPITAL ENCOUNTER (OUTPATIENT)
Dept: INFUSION CENTER | Facility: HOSPITAL | Age: 75
Discharge: HOME | End: 2025-05-29
Attending: INTERNAL MEDICINE
Payer: MEDICARE

## 2025-05-29 VITALS
BODY MASS INDEX: 27.96 KG/M2 | DIASTOLIC BLOOD PRESSURE: 61 MMHG | HEIGHT: 71 IN | HEART RATE: 57 BPM | WEIGHT: 199.74 LBS | RESPIRATION RATE: 17 BRPM | OXYGEN SATURATION: 99 % | SYSTOLIC BLOOD PRESSURE: 132 MMHG | TEMPERATURE: 97.5 F

## 2025-05-29 DIAGNOSIS — D46.Z MDS (MYELODYSPLASTIC SYNDROME), HIGH GRADE (HCC): ICD-10-CM

## 2025-05-29 DIAGNOSIS — D64.9 ACUTE ANEMIA: Primary | ICD-10-CM

## 2025-05-29 PROCEDURE — 96413 CHEMO IV INFUSION 1 HR: CPT

## 2025-05-29 PROCEDURE — 96367 TX/PROPH/DG ADDL SEQ IV INF: CPT

## 2025-05-29 PROCEDURE — 86923 COMPATIBILITY TEST ELECTRIC: CPT

## 2025-05-29 RX ORDER — SODIUM CHLORIDE 9 MG/ML
20 INJECTION, SOLUTION INTRAVENOUS ONCE
Status: CANCELLED | OUTPATIENT
Start: 2025-05-29

## 2025-05-29 RX ORDER — SODIUM CHLORIDE 9 MG/ML
20 INJECTION, SOLUTION INTRAVENOUS ONCE
Status: COMPLETED | OUTPATIENT
Start: 2025-05-29 | End: 2025-05-29

## 2025-05-29 RX ADMIN — AZACITIDINE 159 MG: 100 INJECTION, POWDER, LYOPHILIZED, FOR SOLUTION INTRAVENOUS; SUBCUTANEOUS at 12:42

## 2025-05-29 RX ADMIN — SODIUM CHLORIDE 20 ML/HR: 0.9 INJECTION, SOLUTION INTRAVENOUS at 12:03

## 2025-05-29 RX ADMIN — DEXAMETHASONE SODIUM PHOSPHATE: 10 INJECTION, SOLUTION INTRAMUSCULAR; INTRAVENOUS at 12:05

## 2025-05-29 NOTE — PROGRESS NOTES
Patient's Hgb 6.4. Patient will need blood transfusion. Patient scheduled for 5/30 @ 12:30 for chemo and transfusion. Prepare order released and blood bank notified.

## 2025-05-29 NOTE — PROGRESS NOTES
Rikki Reyes  tolerated chemotherapy infusion well with no complications.      Rikki Reyes is aware of future appt tomorrow at 1:30PM.    AVS declined by Rikki Reyes.

## 2025-05-29 NOTE — PLAN OF CARE
Problem: Potential for Falls  Goal: Patient will remain free of falls  Description: INTERVENTIONS:  - Educate patient/family on patient safety including physical limitations  - Instruct patient to call for assistance with activity   - Consider consulting OT/PT to assist with strengthening/mobility based on AM PAC & JH-HLM score  - Consult OT/PT to assist with strengthening/mobility   - Keep Call bell within reach  - Keep bed low and locked with side rails adjusted as appropriate  - Keep care items and personal belongings within reach  - Initiate and maintain comfort rounds  - Make Fall Risk Sign visible to staff  - Apply yellow socks and bracelet for high fall risk patients  - Consider moving patient to room near nurses station  Outcome: Progressing     Problem: Knowledge Deficit  Goal: Patient/family/caregiver demonstrates understanding of disease process, treatment plan, medications, and discharge instructions  Description: Complete learning assessment and assess knowledge base.  Interventions:  - Provide teaching at level of understanding  - Provide teaching via preferred learning methods  Outcome: Progressing

## 2025-05-30 ENCOUNTER — HOSPITAL ENCOUNTER (OUTPATIENT)
Dept: INFUSION CENTER | Facility: HOSPITAL | Age: 75
Discharge: HOME | End: 2025-05-30
Attending: INTERNAL MEDICINE
Payer: MEDICARE

## 2025-05-30 VITALS
DIASTOLIC BLOOD PRESSURE: 68 MMHG | HEART RATE: 68 BPM | BODY MASS INDEX: 28.27 KG/M2 | SYSTOLIC BLOOD PRESSURE: 136 MMHG | OXYGEN SATURATION: 98 % | RESPIRATION RATE: 16 BRPM | WEIGHT: 201.94 LBS | TEMPERATURE: 97.6 F | HEIGHT: 71 IN

## 2025-05-30 DIAGNOSIS — D64.9 ACUTE ANEMIA: ICD-10-CM

## 2025-05-30 DIAGNOSIS — D46.Z MDS (MYELODYSPLASTIC SYNDROME), HIGH GRADE (HCC): Primary | ICD-10-CM

## 2025-05-30 PROCEDURE — 96367 TX/PROPH/DG ADDL SEQ IV INF: CPT

## 2025-05-30 PROCEDURE — 36430 TRANSFUSION BLD/BLD COMPNT: CPT

## 2025-05-30 PROCEDURE — P9040 RBC LEUKOREDUCED IRRADIATED: HCPCS

## 2025-05-30 PROCEDURE — 96413 CHEMO IV INFUSION 1 HR: CPT

## 2025-05-30 RX ORDER — SODIUM CHLORIDE 9 MG/ML
20 INJECTION, SOLUTION INTRAVENOUS ONCE
Status: CANCELLED | OUTPATIENT
Start: 2025-06-03

## 2025-05-30 RX ORDER — SODIUM CHLORIDE 9 MG/ML
20 INJECTION, SOLUTION INTRAVENOUS ONCE
Status: COMPLETED | OUTPATIENT
Start: 2025-05-30 | End: 2025-05-30

## 2025-05-30 RX ADMIN — AZACITIDINE 159 MG: 100 INJECTION, POWDER, LYOPHILIZED, FOR SOLUTION INTRAVENOUS; SUBCUTANEOUS at 13:33

## 2025-05-30 RX ADMIN — SODIUM CHLORIDE 20 ML/HR: 0.9 INJECTION, SOLUTION INTRAVENOUS at 13:17

## 2025-05-30 RX ADMIN — DEXAMETHASONE SODIUM PHOSPHATE: 10 INJECTION, SOLUTION INTRAMUSCULAR; INTRAVENOUS at 12:45

## 2025-05-30 NOTE — PROGRESS NOTES
Rikki Reyes  tolerated both vidaza and 1 unit of PRBCs treatment well with no complications.      Rikki Reyes is aware of future appt on Monday at 1     AVS printed and given to Rikki Reyes:  No (Declined by Rikki Reyes)

## 2025-05-31 DIAGNOSIS — D46.Z MDS (MYELODYSPLASTIC SYNDROME), HIGH GRADE (HCC): ICD-10-CM

## 2025-05-31 DIAGNOSIS — Z78.9 ANTIVIRAL PROPHYLAXIS RECOMMENDED: Primary | ICD-10-CM

## 2025-06-02 ENCOUNTER — HOSPITAL ENCOUNTER (OUTPATIENT)
Dept: INFUSION CENTER | Facility: HOSPITAL | Age: 75
Discharge: HOME | End: 2025-06-02
Attending: INTERNAL MEDICINE
Payer: MEDICARE

## 2025-06-02 VITALS
WEIGHT: 201.94 LBS | HEART RATE: 69 BPM | HEIGHT: 71 IN | BODY MASS INDEX: 28.27 KG/M2 | OXYGEN SATURATION: 100 % | DIASTOLIC BLOOD PRESSURE: 59 MMHG | SYSTOLIC BLOOD PRESSURE: 123 MMHG | TEMPERATURE: 97.3 F | RESPIRATION RATE: 16 BRPM

## 2025-06-02 DIAGNOSIS — D46.Z MDS (MYELODYSPLASTIC SYNDROME), HIGH GRADE (HCC): Primary | ICD-10-CM

## 2025-06-02 LAB
ALBUMIN SERPL BCG-MCNC: 3.7 G/DL (ref 3.5–5)
ALP SERPL-CCNC: 46 U/L (ref 34–104)
ALT SERPL W P-5'-P-CCNC: 7 U/L (ref 7–52)
ANION GAP SERPL CALCULATED.3IONS-SCNC: 6 MMOL/L (ref 4–13)
ANISOCYTOSIS BLD QL SMEAR: PRESENT
AST SERPL W P-5'-P-CCNC: 7 U/L (ref 13–39)
BASO STIPL BLD QL SMEAR: PRESENT
BASOPHILS # BLD MANUAL: 0 THOUSAND/UL (ref 0–0.1)
BASOPHILS NFR MAR MANUAL: 0 % (ref 0–1)
BILIRUB SERPL-MCNC: 2.61 MG/DL (ref 0.2–1)
BUN SERPL-MCNC: 13 MG/DL (ref 5–25)
CALCIUM SERPL-MCNC: 9.1 MG/DL (ref 8.4–10.2)
CHLORIDE SERPL-SCNC: 104 MMOL/L (ref 96–108)
CO2 SERPL-SCNC: 29 MMOL/L (ref 21–32)
CREAT SERPL-MCNC: 0.58 MG/DL (ref 0.6–1.3)
DACRYOCYTES BLD QL SMEAR: PRESENT
EOSINOPHIL # BLD MANUAL: 0.05 THOUSAND/UL (ref 0–0.4)
EOSINOPHIL NFR BLD MANUAL: 1 % (ref 0–6)
ERYTHROCYTE [DISTWIDTH] IN BLOOD BY AUTOMATED COUNT: 23 % (ref 11.6–15.1)
GFR SERPL CREATININE-BSD FRML MDRD: 100 ML/MIN/1.73SQ M
GLUCOSE SERPL-MCNC: 131 MG/DL (ref 65–140)
HCT VFR BLD AUTO: 25.3 % (ref 36.5–49.3)
HGB BLD-MCNC: 7.9 G/DL (ref 12–17)
LYMPHOCYTES # BLD AUTO: 1.33 THOUSAND/UL (ref 0.6–4.47)
LYMPHOCYTES # BLD AUTO: 25 % (ref 14–44)
MACROCYTES BLD QL AUTO: PRESENT
MCH RBC QN AUTO: 32.5 PG (ref 26.8–34.3)
MCHC RBC AUTO-ENTMCNC: 31.2 G/DL (ref 31.4–37.4)
MCV RBC AUTO: 104 FL (ref 82–98)
MONOCYTES # BLD AUTO: 0 THOUSAND/UL (ref 0–1.22)
MONOCYTES NFR BLD: 0 % (ref 4–12)
NEUTROPHILS # BLD MANUAL: 3.93 THOUSAND/UL (ref 1.85–7.62)
NEUTS BAND NFR BLD MANUAL: 5 % (ref 0–8)
NEUTS SEG NFR BLD AUTO: 69 % (ref 43–75)
PLATELET # BLD AUTO: 24 THOUSANDS/UL (ref 149–390)
PLATELET BLD QL SMEAR: ABNORMAL
PMV BLD AUTO: 13.7 FL (ref 8.9–12.7)
POTASSIUM SERPL-SCNC: 3.6 MMOL/L (ref 3.5–5.3)
PROT SERPL-MCNC: 5.5 G/DL (ref 6.4–8.4)
RBC # BLD AUTO: 2.43 MILLION/UL (ref 3.88–5.62)
RBC MORPH BLD: PRESENT
SODIUM SERPL-SCNC: 139 MMOL/L (ref 135–147)
WBC # BLD AUTO: 5.31 THOUSAND/UL (ref 4.31–10.16)

## 2025-06-02 PROCEDURE — 85027 COMPLETE CBC AUTOMATED: CPT | Performed by: INTERNAL MEDICINE

## 2025-06-02 PROCEDURE — 96367 TX/PROPH/DG ADDL SEQ IV INF: CPT

## 2025-06-02 PROCEDURE — 80053 COMPREHEN METABOLIC PANEL: CPT | Performed by: INTERNAL MEDICINE

## 2025-06-02 PROCEDURE — 96413 CHEMO IV INFUSION 1 HR: CPT

## 2025-06-02 PROCEDURE — 85007 BL SMEAR W/DIFF WBC COUNT: CPT | Performed by: INTERNAL MEDICINE

## 2025-06-02 RX ORDER — SODIUM CHLORIDE 9 MG/ML
20 INJECTION, SOLUTION INTRAVENOUS ONCE
Status: COMPLETED | OUTPATIENT
Start: 2025-06-02 | End: 2025-06-02

## 2025-06-02 RX ORDER — ACYCLOVIR 400 MG/1
400 TABLET ORAL 2 TIMES DAILY
Qty: 180 TABLET | Refills: 3 | Status: SHIPPED | OUTPATIENT
Start: 2025-06-02 | End: 2026-06-02

## 2025-06-02 RX ADMIN — DEXAMETHASONE SODIUM PHOSPHATE: 10 INJECTION, SOLUTION INTRAMUSCULAR; INTRAVENOUS at 13:23

## 2025-06-02 RX ADMIN — AZACITIDINE 159 MG: 100 INJECTION, POWDER, LYOPHILIZED, FOR SOLUTION INTRAVENOUS; SUBCUTANEOUS at 13:52

## 2025-06-02 RX ADMIN — SODIUM CHLORIDE 20 ML/HR: 9 INJECTION, SOLUTION INTRAVENOUS at 13:20

## 2025-06-02 NOTE — PROGRESS NOTES
Rikki Reyes  tolerated Vidaza treatment well with no complications.      Rikki Reyes is aware of future appt on 6/3 at 1PM.     AVS printed and given to Rikki Reyes: No (Declined by Rikki Reyes).

## 2025-06-03 ENCOUNTER — HOSPITAL ENCOUNTER (OUTPATIENT)
Dept: INFUSION CENTER | Facility: HOSPITAL | Age: 75
Discharge: HOME | End: 2025-06-03
Attending: INTERNAL MEDICINE
Payer: MEDICARE

## 2025-06-03 VITALS
SYSTOLIC BLOOD PRESSURE: 115 MMHG | DIASTOLIC BLOOD PRESSURE: 57 MMHG | HEIGHT: 71 IN | TEMPERATURE: 97.9 F | HEART RATE: 76 BPM | OXYGEN SATURATION: 98 % | WEIGHT: 201.94 LBS | RESPIRATION RATE: 16 BRPM | BODY MASS INDEX: 28.27 KG/M2

## 2025-06-03 DIAGNOSIS — D46.Z MDS (MYELODYSPLASTIC SYNDROME), HIGH GRADE (HCC): Primary | ICD-10-CM

## 2025-06-03 PROBLEM — Z95.828 PORT-A-CATH IN PLACE: Status: ACTIVE | Noted: 2025-06-03

## 2025-06-03 PROCEDURE — 96367 TX/PROPH/DG ADDL SEQ IV INF: CPT

## 2025-06-03 PROCEDURE — 96413 CHEMO IV INFUSION 1 HR: CPT

## 2025-06-03 RX ORDER — SODIUM CHLORIDE 9 MG/ML
20 INJECTION, SOLUTION INTRAVENOUS ONCE
Status: COMPLETED | OUTPATIENT
Start: 2025-06-03 | End: 2025-06-03

## 2025-06-03 RX ADMIN — DEXAMETHASONE SODIUM PHOSPHATE: 10 INJECTION, SOLUTION INTRAMUSCULAR; INTRAVENOUS at 13:16

## 2025-06-03 RX ADMIN — SODIUM CHLORIDE 20 ML/HR: 0.9 INJECTION, SOLUTION INTRAVENOUS at 13:17

## 2025-06-03 RX ADMIN — AZACITIDINE 159 MG: 100 INJECTION, POWDER, LYOPHILIZED, FOR SOLUTION INTRAVENOUS; SUBCUTANEOUS at 14:07

## 2025-06-03 NOTE — PROGRESS NOTES
Rikki Reyes  tolerated vidaza well with no complications.      Rikki Reyes is aware of future appt on 6-4-25 at 1pm     AVS declined by Rikki Reyes

## 2025-06-04 ENCOUNTER — HOSPITAL ENCOUNTER (OUTPATIENT)
Dept: INFUSION CENTER | Facility: HOSPITAL | Age: 75
Discharge: HOME | End: 2025-06-04
Attending: INTERNAL MEDICINE
Payer: MEDICARE

## 2025-06-04 ENCOUNTER — TELEPHONE (OUTPATIENT)
Dept: OTHER | Facility: HOSPITAL | Age: 75
End: 2025-06-04

## 2025-06-04 VITALS — HEIGHT: 71 IN | BODY MASS INDEX: 28.27 KG/M2 | WEIGHT: 201.94 LBS

## 2025-06-04 DIAGNOSIS — D46.Z MDS (MYELODYSPLASTIC SYNDROME), HIGH GRADE (HCC): Primary | ICD-10-CM

## 2025-06-04 LAB
ERYTHROCYTE [DISTWIDTH] IN BLOOD BY AUTOMATED COUNT: 23.1 % (ref 11.6–15.1)
HCT VFR BLD AUTO: 24.5 % (ref 36.5–49.3)
HGB BLD-MCNC: 7.5 G/DL (ref 12–17)
MCH RBC QN AUTO: 32.6 PG (ref 26.8–34.3)
MCHC RBC AUTO-ENTMCNC: 30.6 G/DL (ref 31.4–37.4)
MCV RBC AUTO: 107 FL (ref 82–98)
NRBC BLD AUTO-RTO: 0 /100 WBCS
PLATELET # BLD AUTO: 17 THOUSANDS/UL (ref 149–390)
PMV BLD AUTO: 13.5 FL (ref 8.9–12.7)
RBC # BLD AUTO: 2.3 MILLION/UL (ref 3.88–5.62)
WBC # BLD AUTO: 8.05 THOUSAND/UL (ref 4.31–10.16)

## 2025-06-04 PROCEDURE — 96367 TX/PROPH/DG ADDL SEQ IV INF: CPT

## 2025-06-04 PROCEDURE — 85027 COMPLETE CBC AUTOMATED: CPT | Performed by: INTERNAL MEDICINE

## 2025-06-04 PROCEDURE — 96413 CHEMO IV INFUSION 1 HR: CPT

## 2025-06-04 RX ORDER — SODIUM CHLORIDE 9 MG/ML
20 INJECTION, SOLUTION INTRAVENOUS ONCE
Status: COMPLETED | OUTPATIENT
Start: 2025-06-04 | End: 2025-06-04

## 2025-06-04 RX ADMIN — SODIUM CHLORIDE 20 ML/HR: 0.9 INJECTION, SOLUTION INTRAVENOUS at 13:35

## 2025-06-04 RX ADMIN — DEXAMETHASONE SODIUM PHOSPHATE: 10 INJECTION, SOLUTION INTRAMUSCULAR; INTRAVENOUS at 13:31

## 2025-06-04 RX ADMIN — AZACITIDINE 159 MG: 100 INJECTION, POWDER, LYOPHILIZED, FOR SOLUTION INTRAVENOUS; SUBCUTANEOUS at 14:24

## 2025-06-04 NOTE — TELEPHONE ENCOUNTER
Telephone Encounter: Medical Oncology:  Rikki Reyes 75 y.o. 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 Has therapy plan ordered for transfusions as needed.  Will alert primary team oncology team.    Instructed patient if develops active signs of bleeding should report to the ED for evaluation.    Edu Seo D.O.  Hematology-Oncology Fellow (PGY-4)

## 2025-06-04 NOTE — PROGRESS NOTES
Rkiki Reyes  tolerated Vidaza treatment well with no complications.      Rikki Reyes is aware of future appt on 6/6 at 9AM.     AVS printed and given to Rikki Reyes: No (Declined by Rikki Reyes).

## 2025-06-05 ENCOUNTER — HOSPITAL ENCOUNTER (OUTPATIENT)
Dept: INFUSION CENTER | Facility: HOSPITAL | Age: 75
End: 2025-06-05

## 2025-06-05 ENCOUNTER — TELEPHONE (OUTPATIENT)
Dept: HEMATOLOGY ONCOLOGY | Facility: CLINIC | Age: 75
End: 2025-06-05

## 2025-06-05 RX ORDER — SODIUM CHLORIDE 9 MG/ML
20 INJECTION, SOLUTION INTRAVENOUS ONCE
Status: CANCELLED | OUTPATIENT
Start: 2025-06-06

## 2025-06-05 NOTE — PROGRESS NOTES
Patient to receive 1 unit of platelets tomorrow (6/6). Prepare order released. Blood bank notified patient is scheduled for 9AM. Patient aware and agreeable.

## 2025-06-05 NOTE — TELEPHONE ENCOUNTER
Reviewed platelet count of 17,000 with Dr. Woods.  Recommending platelet transfusion.    Returned call to patient.  Patient scheduled for transfusion on 6/6 and agreeable.      Patient with concerns with out of pocket cost for posaconazole.  Costing $200/month.  Will ask financial advocacy to reach out with any assistance.

## 2025-06-06 ENCOUNTER — HOSPITAL ENCOUNTER (OUTPATIENT)
Dept: INFUSION CENTER | Facility: HOSPITAL | Age: 75
End: 2025-06-06
Payer: MEDICARE

## 2025-06-06 VITALS
DIASTOLIC BLOOD PRESSURE: 57 MMHG | SYSTOLIC BLOOD PRESSURE: 113 MMHG | RESPIRATION RATE: 16 BRPM | HEART RATE: 70 BPM | OXYGEN SATURATION: 99 % | TEMPERATURE: 97.7 F

## 2025-06-06 DIAGNOSIS — D64.9 ACUTE ANEMIA: Primary | ICD-10-CM

## 2025-06-06 PROCEDURE — 36430 TRANSFUSION BLD/BLD COMPNT: CPT

## 2025-06-06 PROCEDURE — P9037 PLATE PHERES LEUKOREDU IRRAD: HCPCS

## 2025-06-06 RX ORDER — SODIUM CHLORIDE 9 MG/ML
20 INJECTION, SOLUTION INTRAVENOUS ONCE
Status: COMPLETED | OUTPATIENT
Start: 2025-06-06 | End: 2025-06-06

## 2025-06-06 RX ADMIN — SODIUM CHLORIDE 20 ML/HR: 0.9 INJECTION, SOLUTION INTRAVENOUS at 09:28

## 2025-06-06 NOTE — PROGRESS NOTES
Patient tolerated 1 unit of platelets transfusion without reaction or issues.      Rikki Reyes is aware of future appt on 6/9/25 at 0930.     AVS -  No (Declined by Rikki Reyes) Patient has Mychart.    Patient ambulated off unit without incident.  All personal belongings taken with patient.

## 2025-06-07 LAB
ABO GROUP BLD BPU: NORMAL
BPU ID: NORMAL
UNIT DISPENSE STATUS: NORMAL
UNIT PRODUCT CODE: NORMAL
UNIT PRODUCT VOLUME: 245 ML
UNIT RH: NORMAL

## 2025-06-09 ENCOUNTER — HOSPITAL ENCOUNTER (OUTPATIENT)
Dept: INFUSION CENTER | Facility: HOSPITAL | Age: 75
Discharge: HOME | End: 2025-06-09
Payer: MEDICARE

## 2025-06-09 VITALS — TEMPERATURE: 96.9 F

## 2025-06-09 DIAGNOSIS — D46.Z MDS (MYELODYSPLASTIC SYNDROME), HIGH GRADE (HCC): ICD-10-CM

## 2025-06-09 DIAGNOSIS — Z95.828 PORT-A-CATH IN PLACE: Primary | ICD-10-CM

## 2025-06-09 DIAGNOSIS — D64.9 ACUTE ANEMIA: ICD-10-CM

## 2025-06-09 LAB
ABO GROUP BLD: NORMAL
ALBUMIN SERPL BCG-MCNC: 4.1 G/DL (ref 3.5–5)
ALP SERPL-CCNC: 48 U/L (ref 34–104)
ALT SERPL W P-5'-P-CCNC: 8 U/L (ref 7–52)
ANION GAP SERPL CALCULATED.3IONS-SCNC: 6 MMOL/L (ref 4–13)
ANISOCYTOSIS BLD QL SMEAR: PRESENT
AST SERPL W P-5'-P-CCNC: 9 U/L (ref 13–39)
BASO STIPL BLD QL SMEAR: PRESENT
BASOPHILS # BLD MANUAL: 0.03 THOUSAND/UL (ref 0–0.1)
BASOPHILS NFR MAR MANUAL: 1 % (ref 0–1)
BILIRUB SERPL-MCNC: 3.46 MG/DL (ref 0.2–1)
BLD GP AB SCN SERPL QL: NEGATIVE
BUN SERPL-MCNC: 16 MG/DL (ref 5–25)
CALCIUM SERPL-MCNC: 9.2 MG/DL (ref 8.4–10.2)
CHLORIDE SERPL-SCNC: 103 MMOL/L (ref 96–108)
CO2 SERPL-SCNC: 27 MMOL/L (ref 21–32)
CREAT SERPL-MCNC: 0.6 MG/DL (ref 0.6–1.3)
EOSINOPHIL # BLD MANUAL: 0.03 THOUSAND/UL (ref 0–0.4)
EOSINOPHIL NFR BLD MANUAL: 1 % (ref 0–6)
ERYTHROCYTE [DISTWIDTH] IN BLOOD BY AUTOMATED COUNT: 23.8 % (ref 11.6–15.1)
GFR SERPL CREATININE-BSD FRML MDRD: 99 ML/MIN/1.73SQ M
GLUCOSE SERPL-MCNC: 138 MG/DL (ref 65–140)
HCT VFR BLD AUTO: 20.7 % (ref 36.5–49.3)
HGB BLD-MCNC: 6.4 G/DL (ref 12–17)
LYMPHOCYTES # BLD AUTO: 0.89 THOUSAND/UL (ref 0.6–4.47)
LYMPHOCYTES # BLD AUTO: 30 % (ref 14–44)
MACROCYTES BLD QL AUTO: PRESENT
MCH RBC QN AUTO: 33.3 PG (ref 26.8–34.3)
MCHC RBC AUTO-ENTMCNC: 30.9 G/DL (ref 31.4–37.4)
MCV RBC AUTO: 108 FL (ref 82–98)
MICROCYTES BLD QL AUTO: PRESENT
MONOCYTES # BLD AUTO: 0 THOUSAND/UL (ref 0–1.22)
MONOCYTES NFR BLD: 0 % (ref 4–12)
NEUTROPHILS # BLD MANUAL: 2.01 THOUSAND/UL (ref 1.85–7.62)
NEUTS BAND NFR BLD MANUAL: 1 % (ref 0–8)
NEUTS SEG NFR BLD AUTO: 67 % (ref 43–75)
PLATELET # BLD AUTO: 11 THOUSANDS/UL (ref 149–390)
PLATELET BLD QL SMEAR: ABNORMAL
PMV BLD AUTO: 13 FL (ref 8.9–12.7)
POIKILOCYTOSIS BLD QL SMEAR: PRESENT
POLYCHROMASIA BLD QL SMEAR: PRESENT
POTASSIUM SERPL-SCNC: 4 MMOL/L (ref 3.5–5.3)
PROT SERPL-MCNC: 6.3 G/DL (ref 6.4–8.4)
RBC # BLD AUTO: 1.92 MILLION/UL (ref 3.88–5.62)
RBC MORPH BLD: PRESENT
RH BLD: NEGATIVE
SODIUM SERPL-SCNC: 136 MMOL/L (ref 135–147)
SPECIMEN EXPIRATION DATE: NORMAL
WBC # BLD AUTO: 2.95 THOUSAND/UL (ref 4.31–10.16)

## 2025-06-09 PROCEDURE — 86850 RBC ANTIBODY SCREEN: CPT | Performed by: INTERNAL MEDICINE

## 2025-06-09 PROCEDURE — 85007 BL SMEAR W/DIFF WBC COUNT: CPT | Performed by: INTERNAL MEDICINE

## 2025-06-09 PROCEDURE — 85027 COMPLETE CBC AUTOMATED: CPT | Performed by: INTERNAL MEDICINE

## 2025-06-09 PROCEDURE — 86920 COMPATIBILITY TEST SPIN: CPT

## 2025-06-09 PROCEDURE — 86900 BLOOD TYPING SEROLOGIC ABO: CPT | Performed by: INTERNAL MEDICINE

## 2025-06-09 PROCEDURE — 86901 BLOOD TYPING SEROLOGIC RH(D): CPT | Performed by: INTERNAL MEDICINE

## 2025-06-09 PROCEDURE — 80053 COMPREHEN METABOLIC PANEL: CPT | Performed by: INTERNAL MEDICINE

## 2025-06-09 RX ORDER — SODIUM CHLORIDE 9 MG/ML
20 INJECTION, SOLUTION INTRAVENOUS ONCE
Status: CANCELLED | OUTPATIENT
Start: 2025-06-12

## 2025-06-10 ENCOUNTER — HOSPITAL ENCOUNTER (OUTPATIENT)
Dept: INFUSION CENTER | Facility: HOSPITAL | Age: 75
Discharge: HOME | End: 2025-06-10
Payer: MEDICARE

## 2025-06-10 VITALS
SYSTOLIC BLOOD PRESSURE: 132 MMHG | OXYGEN SATURATION: 98 % | HEART RATE: 64 BPM | TEMPERATURE: 98.1 F | RESPIRATION RATE: 16 BRPM | DIASTOLIC BLOOD PRESSURE: 63 MMHG

## 2025-06-10 DIAGNOSIS — D64.9 ACUTE ANEMIA: Primary | ICD-10-CM

## 2025-06-10 PROCEDURE — 36430 TRANSFUSION BLD/BLD COMPNT: CPT

## 2025-06-10 PROCEDURE — P9040 RBC LEUKOREDUCED IRRADIATED: HCPCS

## 2025-06-10 PROCEDURE — P9037 PLATE PHERES LEUKOREDU IRRAD: HCPCS

## 2025-06-10 RX ORDER — SODIUM CHLORIDE 9 MG/ML
20 INJECTION, SOLUTION INTRAVENOUS ONCE
Status: CANCELLED | OUTPATIENT
Start: 2025-06-12

## 2025-06-10 RX ORDER — SODIUM CHLORIDE 9 MG/ML
20 INJECTION, SOLUTION INTRAVENOUS ONCE
Status: COMPLETED | OUTPATIENT
Start: 2025-06-10 | End: 2025-06-10

## 2025-06-10 RX ADMIN — SODIUM CHLORIDE 20 ML/HR: 0.9 INJECTION, SOLUTION INTRAVENOUS at 11:55

## 2025-06-10 NOTE — PLAN OF CARE
Problem: Potential for Falls  Goal: Patient will remain free of falls  Description: INTERVENTIONS:  - Educate patient/family on patient safety including physical limitations  - Instruct patient to call for assistance with activity   - Consider consulting OT/PT to assist with strengthening/mobility based on AM PAC & JH-HLM score  - Consult OT/PT to assist with strengthening/mobility   - Keep Call bell within reach  - Keep bed low and locked with side rails adjusted as appropriate  - Keep care items and personal belongings within reach  - Initiate and maintain comfort rounds  - Make Fall Risk Sign visible to staff  -- Apply yellow socks and bracelet for high fall risk patients  - Consider moving patient to room near nurses station  Outcome: Progressing

## 2025-06-10 NOTE — PROGRESS NOTES
Patient tolerated PRBC transfusion without reaction or issues.  Port flushed freely.  Good blood return noted.  Port deaccessed without issue. Rikki Reyes  tolerated treatment well with no complications.      Rikki Reyes is aware of future appt on 06/12/25 at 0800.     AVS pNo (Declined by Rikki Reyes) pt has mychart      Patient ambulated off unit without incident.  All personal belongings taken with patient.

## 2025-06-11 ENCOUNTER — APPOINTMENT (OUTPATIENT)
Dept: LAB | Facility: HOSPITAL | Age: 75
End: 2025-06-11
Payer: MEDICARE

## 2025-06-11 ENCOUNTER — TELEPHONE (OUTPATIENT)
Dept: HEMATOLOGY ONCOLOGY | Facility: CLINIC | Age: 75
End: 2025-06-11

## 2025-06-11 DIAGNOSIS — D46.Z MDS (MYELODYSPLASTIC SYNDROME), HIGH GRADE (HCC): ICD-10-CM

## 2025-06-11 LAB
ABO GROUP BLD BPU: NORMAL
ABO GROUP BLD BPU: NORMAL
ALBUMIN SERPL BCG-MCNC: 4 G/DL (ref 3.5–5)
ALP SERPL-CCNC: 59 U/L (ref 34–104)
ALT SERPL W P-5'-P-CCNC: 7 U/L (ref 7–52)
ANION GAP SERPL CALCULATED.3IONS-SCNC: 6 MMOL/L (ref 4–13)
ANISOCYTOSIS BLD QL SMEAR: PRESENT
AST SERPL W P-5'-P-CCNC: 10 U/L (ref 13–39)
BASO STIPL BLD QL SMEAR: PRESENT
BASOPHILS # BLD MANUAL: 0 THOUSAND/UL (ref 0–0.1)
BASOPHILS NFR MAR MANUAL: 0 % (ref 0–1)
BILIRUB SERPL-MCNC: 3.4 MG/DL (ref 0.2–1)
BPU ID: NORMAL
BPU ID: NORMAL
BUN SERPL-MCNC: 9 MG/DL (ref 5–25)
CALCIUM SERPL-MCNC: 9.5 MG/DL (ref 8.4–10.2)
CHLORIDE SERPL-SCNC: 106 MMOL/L (ref 96–108)
CO2 SERPL-SCNC: 27 MMOL/L (ref 21–32)
CREAT SERPL-MCNC: 0.63 MG/DL (ref 0.6–1.3)
CROSSMATCH: NORMAL
EOSINOPHIL # BLD MANUAL: 0.01 THOUSAND/UL (ref 0–0.4)
EOSINOPHIL NFR BLD MANUAL: 1 % (ref 0–6)
ERYTHROCYTE [DISTWIDTH] IN BLOOD BY AUTOMATED COUNT: 26.5 % (ref 11.6–15.1)
GFR SERPL CREATININE-BSD FRML MDRD: 97 ML/MIN/1.73SQ M
GLUCOSE P FAST SERPL-MCNC: 218 MG/DL (ref 65–99)
HCT VFR BLD AUTO: 23.1 % (ref 36.5–49.3)
HGB BLD-MCNC: 7.3 G/DL (ref 12–17)
LYMPHOCYTES # BLD AUTO: 0.56 THOUSAND/UL (ref 0.6–4.47)
LYMPHOCYTES # BLD AUTO: 38 % (ref 14–44)
MACROCYTES BLD QL AUTO: PRESENT
MCH RBC QN AUTO: 33.8 PG (ref 26.8–34.3)
MCHC RBC AUTO-ENTMCNC: 31.6 G/DL (ref 31.4–37.4)
MCV RBC AUTO: 107 FL (ref 82–98)
MICROCYTES BLD QL AUTO: PRESENT
MONOCYTES # BLD AUTO: 0 THOUSAND/UL (ref 0–1.22)
MONOCYTES NFR BLD: 0 % (ref 4–12)
NEUTROPHILS # BLD MANUAL: 0.9 THOUSAND/UL (ref 1.85–7.62)
NEUTS HYPERSEG BLD QL SMEAR: PRESENT
NEUTS SEG NFR BLD AUTO: 61 % (ref 43–75)
NRBC BLD AUTO-RTO: 1 /100 WBC (ref 0–2)
PLATELET # BLD AUTO: 16 THOUSANDS/UL (ref 149–390)
PLATELET BLD QL SMEAR: ABNORMAL
PMV BLD AUTO: 12.2 FL (ref 8.9–12.7)
POIKILOCYTOSIS BLD QL SMEAR: PRESENT
POLYCHROMASIA BLD QL SMEAR: PRESENT
POTASSIUM SERPL-SCNC: 3.9 MMOL/L (ref 3.5–5.3)
PROT SERPL-MCNC: 6.3 G/DL (ref 6.4–8.4)
RBC # BLD AUTO: 2.16 MILLION/UL (ref 3.88–5.62)
RBC MORPH BLD: PRESENT
SODIUM SERPL-SCNC: 139 MMOL/L (ref 135–147)
UNIT DISPENSE STATUS: NORMAL
UNIT DISPENSE STATUS: NORMAL
UNIT PRODUCT CODE: NORMAL
UNIT PRODUCT CODE: NORMAL
UNIT PRODUCT VOLUME: 300 ML
UNIT PRODUCT VOLUME: 350 ML
UNIT RH: NORMAL
UNIT RH: NORMAL
WBC # BLD AUTO: 1.47 THOUSAND/UL (ref 4.31–10.16)

## 2025-06-11 PROCEDURE — 85007 BL SMEAR W/DIFF WBC COUNT: CPT

## 2025-06-11 PROCEDURE — 36415 COLL VENOUS BLD VENIPUNCTURE: CPT

## 2025-06-11 PROCEDURE — 85027 COMPLETE CBC AUTOMATED: CPT

## 2025-06-11 PROCEDURE — 80053 COMPREHEN METABOLIC PANEL: CPT

## 2025-06-11 RX ORDER — SODIUM CHLORIDE 9 MG/ML
20 INJECTION, SOLUTION INTRAVENOUS ONCE
Status: CANCELLED | OUTPATIENT
Start: 2025-06-17

## 2025-06-12 ENCOUNTER — HOSPITAL ENCOUNTER (OUTPATIENT)
Dept: INFUSION CENTER | Facility: HOSPITAL | Age: 75
Discharge: HOME | End: 2025-06-12

## 2025-06-12 ENCOUNTER — HOSPITAL ENCOUNTER (OUTPATIENT)
Dept: INFUSION CENTER | Facility: HOSPITAL | Age: 75
End: 2025-06-12
Payer: MEDICARE

## 2025-06-12 VITALS
HEART RATE: 77 BPM | OXYGEN SATURATION: 98 % | DIASTOLIC BLOOD PRESSURE: 56 MMHG | SYSTOLIC BLOOD PRESSURE: 123 MMHG | RESPIRATION RATE: 13 BRPM | TEMPERATURE: 98.2 F

## 2025-06-12 DIAGNOSIS — D64.9 ACUTE ANEMIA: Primary | ICD-10-CM

## 2025-06-12 PROCEDURE — 36430 TRANSFUSION BLD/BLD COMPNT: CPT

## 2025-06-12 PROCEDURE — P9037 PLATE PHERES LEUKOREDU IRRAD: HCPCS

## 2025-06-12 RX ORDER — SODIUM CHLORIDE 9 MG/ML
20 INJECTION, SOLUTION INTRAVENOUS ONCE
Status: COMPLETED | OUTPATIENT
Start: 2025-06-12 | End: 2025-06-12

## 2025-06-12 RX ADMIN — SODIUM CHLORIDE 20 ML/HR: 0.9 INJECTION, SOLUTION INTRAVENOUS at 08:06

## 2025-06-12 NOTE — PROGRESS NOTES
Rikki Reyes  tolerated BLOOD PRODUCT treatment well with no complications.      Rikki Reyes is aware of future appt on 6/23 at 1000.     AVS printed and given to Rikki Reyes:   No (Declined by Rikki Reyes)

## 2025-06-13 LAB
ABO GROUP BLD BPU: NORMAL
BPU ID: NORMAL
UNIT DISPENSE STATUS: NORMAL
UNIT PRODUCT CODE: NORMAL
UNIT PRODUCT VOLUME: 277 ML
UNIT RH: NORMAL

## 2025-06-16 ENCOUNTER — APPOINTMENT (OUTPATIENT)
Dept: LAB | Facility: HOSPITAL | Age: 75
End: 2025-06-16
Attending: INTERNAL MEDICINE
Payer: MEDICARE

## 2025-06-16 DIAGNOSIS — D46.Z MDS (MYELODYSPLASTIC SYNDROME), HIGH GRADE (HCC): ICD-10-CM

## 2025-06-16 DIAGNOSIS — D46.Z MDS (MYELODYSPLASTIC SYNDROME), HIGH GRADE (HCC): Primary | ICD-10-CM

## 2025-06-16 DIAGNOSIS — D64.9 ACUTE ANEMIA: ICD-10-CM

## 2025-06-16 LAB
ABO GROUP BLD: NORMAL
ALBUMIN SERPL BCG-MCNC: 4 G/DL (ref 3.5–5)
ALP SERPL-CCNC: 53 U/L (ref 34–104)
ALT SERPL W P-5'-P-CCNC: 6 U/L (ref 7–52)
ANION GAP SERPL CALCULATED.3IONS-SCNC: 5 MMOL/L (ref 4–13)
ANISOCYTOSIS BLD QL SMEAR: PRESENT
AST SERPL W P-5'-P-CCNC: 7 U/L (ref 13–39)
BASO STIPL BLD QL SMEAR: PRESENT
BASOPHILS # BLD MANUAL: 0.01 THOUSAND/UL (ref 0–0.1)
BASOPHILS NFR MAR MANUAL: 1 % (ref 0–1)
BILIRUB SERPL-MCNC: 3.31 MG/DL (ref 0.2–1)
BLD GP AB SCN SERPL QL: NEGATIVE
BUN SERPL-MCNC: 11 MG/DL (ref 5–25)
CALCIUM SERPL-MCNC: 9.3 MG/DL (ref 8.4–10.2)
CHLORIDE SERPL-SCNC: 103 MMOL/L (ref 96–108)
CO2 SERPL-SCNC: 28 MMOL/L (ref 21–32)
CREAT SERPL-MCNC: 0.67 MG/DL (ref 0.6–1.3)
EOSINOPHIL # BLD MANUAL: 0.01 THOUSAND/UL (ref 0–0.4)
EOSINOPHIL NFR BLD MANUAL: 1 % (ref 0–6)
ERYTHROCYTE [DISTWIDTH] IN BLOOD BY AUTOMATED COUNT: 27.6 % (ref 11.6–15.1)
GFR SERPL CREATININE-BSD FRML MDRD: 94 ML/MIN/1.73SQ M
GIANT PLATELETS BLD QL SMEAR: PRESENT
GLUCOSE P FAST SERPL-MCNC: 148 MG/DL (ref 65–99)
HCT VFR BLD AUTO: 22.3 % (ref 36.5–49.3)
HGB BLD-MCNC: 6.8 G/DL (ref 12–17)
LYMPHOCYTES # BLD AUTO: 0.43 THOUSAND/UL (ref 0.6–4.47)
LYMPHOCYTES # BLD AUTO: 58 % (ref 14–44)
MACROCYTES BLD QL AUTO: PRESENT
MCH RBC QN AUTO: 33.7 PG (ref 26.8–34.3)
MCHC RBC AUTO-ENTMCNC: 30.5 G/DL (ref 31.4–37.4)
MCV RBC AUTO: 110 FL (ref 82–98)
MONOCYTES # BLD AUTO: 0 THOUSAND/UL (ref 0–1.22)
MONOCYTES NFR BLD: 0 % (ref 4–12)
NEUTROPHILS # BLD MANUAL: 0.3 THOUSAND/UL (ref 1.85–7.62)
NEUTS BAND NFR BLD MANUAL: 4 % (ref 0–8)
NEUTS SEG NFR BLD AUTO: 36 % (ref 43–75)
PLATELET # BLD AUTO: 25 THOUSANDS/UL (ref 149–390)
PLATELET BLD QL SMEAR: ABNORMAL
PMV BLD AUTO: 12.2 FL (ref 8.9–12.7)
POIKILOCYTOSIS BLD QL SMEAR: PRESENT
POLYCHROMASIA BLD QL SMEAR: PRESENT
POTASSIUM SERPL-SCNC: 3.3 MMOL/L (ref 3.5–5.3)
PROT SERPL-MCNC: 6.4 G/DL (ref 6.4–8.4)
RBC # BLD AUTO: 2.02 MILLION/UL (ref 3.88–5.62)
RBC MORPH BLD: PRESENT
RH BLD: NEGATIVE
SODIUM SERPL-SCNC: 136 MMOL/L (ref 135–147)
SPECIMEN EXPIRATION DATE: NORMAL
WBC # BLD AUTO: 0.74 THOUSAND/UL (ref 4.31–10.16)

## 2025-06-16 PROCEDURE — 86901 BLOOD TYPING SEROLOGIC RH(D): CPT

## 2025-06-16 PROCEDURE — 85027 COMPLETE CBC AUTOMATED: CPT

## 2025-06-16 PROCEDURE — 36415 COLL VENOUS BLD VENIPUNCTURE: CPT

## 2025-06-16 PROCEDURE — 80053 COMPREHEN METABOLIC PANEL: CPT

## 2025-06-16 PROCEDURE — 85007 BL SMEAR W/DIFF WBC COUNT: CPT

## 2025-06-16 PROCEDURE — 86923 COMPATIBILITY TEST ELECTRIC: CPT

## 2025-06-16 PROCEDURE — 86850 RBC ANTIBODY SCREEN: CPT

## 2025-06-16 PROCEDURE — 86900 BLOOD TYPING SEROLOGIC ABO: CPT

## 2025-06-16 RX ORDER — SODIUM CHLORIDE 9 MG/ML
20 INJECTION, SOLUTION INTRAVENOUS ONCE
OUTPATIENT
Start: 2025-07-07

## 2025-06-16 RX ORDER — SODIUM CHLORIDE 9 MG/ML
20 INJECTION, SOLUTION INTRAVENOUS ONCE
OUTPATIENT
Start: 2025-07-11

## 2025-06-16 RX ORDER — SODIUM CHLORIDE 9 MG/ML
20 INJECTION, SOLUTION INTRAVENOUS ONCE
Status: CANCELLED | OUTPATIENT
Start: 2025-06-17

## 2025-06-16 RX ORDER — SODIUM CHLORIDE 9 MG/ML
20 INJECTION, SOLUTION INTRAVENOUS ONCE
OUTPATIENT
Start: 2025-07-03

## 2025-06-16 RX ORDER — SODIUM CHLORIDE 9 MG/ML
20 INJECTION, SOLUTION INTRAVENOUS ONCE
OUTPATIENT
Start: 2025-07-10

## 2025-06-16 RX ORDER — SODIUM CHLORIDE 9 MG/ML
20 INJECTION, SOLUTION INTRAVENOUS ONCE
OUTPATIENT
Start: 2025-07-02

## 2025-06-16 RX ORDER — SODIUM CHLORIDE 9 MG/ML
20 INJECTION, SOLUTION INTRAVENOUS ONCE
OUTPATIENT
Start: 2025-07-01

## 2025-06-16 RX ORDER — SODIUM CHLORIDE 9 MG/ML
20 INJECTION, SOLUTION INTRAVENOUS ONCE
OUTPATIENT
Start: 2025-06-30

## 2025-06-17 ENCOUNTER — HOSPITAL ENCOUNTER (OUTPATIENT)
Dept: INFUSION CENTER | Facility: HOSPITAL | Age: 75
Discharge: HOME | End: 2025-06-17
Payer: MEDICARE

## 2025-06-17 VITALS
OXYGEN SATURATION: 98 % | TEMPERATURE: 97.2 F | HEART RATE: 72 BPM | RESPIRATION RATE: 12 BRPM | DIASTOLIC BLOOD PRESSURE: 59 MMHG | SYSTOLIC BLOOD PRESSURE: 97 MMHG

## 2025-06-17 DIAGNOSIS — D64.9 ACUTE ANEMIA: Primary | ICD-10-CM

## 2025-06-17 PROCEDURE — P9040 RBC LEUKOREDUCED IRRADIATED: HCPCS

## 2025-06-17 PROCEDURE — 36430 TRANSFUSION BLD/BLD COMPNT: CPT

## 2025-06-17 RX ORDER — SODIUM CHLORIDE 9 MG/ML
20 INJECTION, SOLUTION INTRAVENOUS ONCE
Status: COMPLETED | OUTPATIENT
Start: 2025-06-17 | End: 2025-06-17

## 2025-06-17 RX ADMIN — SODIUM CHLORIDE 20 ML/HR: 0.9 INJECTION, SOLUTION INTRAVENOUS at 10:45

## 2025-06-17 NOTE — PROGRESS NOTES
Rikki Reyes  tolerated BLOOD TRANSFUSION treatment well with no complications.      Rikki Reyes is aware of future appt on 6/23 at 1030  .     AVS printed and given to Rikki Reyes:    No (Declined by Rikki Reyes)

## 2025-06-18 LAB
ABO GROUP BLD BPU: NORMAL
BPU ID: NORMAL
CROSSMATCH: NORMAL
UNIT DISPENSE STATUS: NORMAL
UNIT PRODUCT CODE: NORMAL
UNIT PRODUCT VOLUME: 250 ML
UNIT RH: NORMAL

## 2025-06-19 ENCOUNTER — TELEPHONE (OUTPATIENT)
Dept: HEMATOLOGY ONCOLOGY | Facility: CLINIC | Age: 75
End: 2025-06-19

## 2025-06-19 ENCOUNTER — TELEPHONE (OUTPATIENT)
Dept: FAMILY MEDICINE CLINIC | Facility: CLINIC | Age: 75
End: 2025-06-19

## 2025-06-19 ENCOUNTER — APPOINTMENT (OUTPATIENT)
Dept: LAB | Facility: HOSPITAL | Age: 75
End: 2025-06-19
Payer: MEDICARE

## 2025-06-19 ENCOUNTER — OFFICE VISIT (OUTPATIENT)
Dept: HEMATOLOGY ONCOLOGY | Facility: CLINIC | Age: 75
End: 2025-06-19
Payer: MEDICARE

## 2025-06-19 ENCOUNTER — HOSPITAL ENCOUNTER (OUTPATIENT)
Dept: INFUSION CENTER | Facility: HOSPITAL | Age: 75
End: 2025-06-19

## 2025-06-19 VITALS
SYSTOLIC BLOOD PRESSURE: 120 MMHG | OXYGEN SATURATION: 97 % | WEIGHT: 188 LBS | DIASTOLIC BLOOD PRESSURE: 70 MMHG | TEMPERATURE: 100.5 F | BODY MASS INDEX: 26.32 KG/M2 | RESPIRATION RATE: 18 BRPM | HEART RATE: 87 BPM | HEIGHT: 71 IN

## 2025-06-19 DIAGNOSIS — D46.Z MDS (MYELODYSPLASTIC SYNDROME), HIGH GRADE (HCC): Primary | ICD-10-CM

## 2025-06-19 DIAGNOSIS — I10 PRIMARY HYPERTENSION: ICD-10-CM

## 2025-06-19 DIAGNOSIS — Z12.5 SCREENING FOR PROSTATE CANCER: ICD-10-CM

## 2025-06-19 DIAGNOSIS — E78.2 MIXED HYPERLIPIDEMIA: ICD-10-CM

## 2025-06-19 DIAGNOSIS — D69.6 THROMBOCYTOPENIA (HCC): ICD-10-CM

## 2025-06-19 DIAGNOSIS — N40.1 BPH ASSOCIATED WITH NOCTURIA: ICD-10-CM

## 2025-06-19 DIAGNOSIS — D46.Z MDS (MYELODYSPLASTIC SYNDROME), HIGH GRADE (HCC): ICD-10-CM

## 2025-06-19 DIAGNOSIS — E11.9 DM TYPE 2 WITHOUT RETINOPATHY (HCC): ICD-10-CM

## 2025-06-19 DIAGNOSIS — R35.1 BPH ASSOCIATED WITH NOCTURIA: ICD-10-CM

## 2025-06-19 LAB
ALBUMIN SERPL BCG-MCNC: 3.7 G/DL (ref 3.5–5)
ALP SERPL-CCNC: 42 U/L (ref 34–104)
ALT SERPL W P-5'-P-CCNC: 5 U/L (ref 7–52)
ANION GAP SERPL CALCULATED.3IONS-SCNC: 7 MMOL/L (ref 4–13)
ANISOCYTOSIS BLD QL SMEAR: PRESENT
AST SERPL W P-5'-P-CCNC: 6 U/L (ref 13–39)
BASOPHILS # BLD MANUAL: 0 THOUSAND/UL (ref 0–0.1)
BASOPHILS NFR MAR MANUAL: 0 % (ref 0–1)
BILIRUB SERPL-MCNC: 2.95 MG/DL (ref 0.2–1)
BUN SERPL-MCNC: 11 MG/DL (ref 5–25)
CALCIUM SERPL-MCNC: 9.1 MG/DL (ref 8.4–10.2)
CHLORIDE SERPL-SCNC: 104 MMOL/L (ref 96–108)
CHOLEST SERPL-MCNC: 104 MG/DL (ref ?–200)
CO2 SERPL-SCNC: 25 MMOL/L (ref 21–32)
CREAT SERPL-MCNC: 0.71 MG/DL (ref 0.6–1.3)
CREAT UR-MCNC: 171 MG/DL
EOSINOPHIL # BLD MANUAL: 0.01 THOUSAND/UL (ref 0–0.4)
EOSINOPHIL NFR BLD MANUAL: 1 % (ref 0–6)
ERYTHROCYTE [DISTWIDTH] IN BLOOD BY AUTOMATED COUNT: 25.8 % (ref 11.6–15.1)
EST. AVERAGE GLUCOSE BLD GHB EST-MCNC: 85 MG/DL
GFR SERPL CREATININE-BSD FRML MDRD: 92 ML/MIN/1.73SQ M
GLUCOSE P FAST SERPL-MCNC: 177 MG/DL (ref 65–99)
HBA1C MFR BLD: 4.6 %
HCT VFR BLD AUTO: 24.1 % (ref 36.5–49.3)
HDLC SERPL-MCNC: 39 MG/DL
HGB BLD-MCNC: 7.1 G/DL (ref 12–17)
LDLC SERPL CALC-MCNC: 53 MG/DL (ref 0–100)
LYMPHOCYTES # BLD AUTO: 0.31 THOUSAND/UL (ref 0.6–4.47)
LYMPHOCYTES # BLD AUTO: 39 % (ref 14–44)
MACROCYTES BLD QL AUTO: PRESENT
MCH RBC QN AUTO: 32.3 PG (ref 26.8–34.3)
MCHC RBC AUTO-ENTMCNC: 29.5 G/DL (ref 31.4–37.4)
MCV RBC AUTO: 110 FL (ref 82–98)
MICROALBUMIN UR-MCNC: 16.4 MG/L
MICROALBUMIN/CREAT 24H UR: 10 MG/G CREATININE (ref 0–30)
MICROCYTES BLD QL AUTO: PRESENT
MONOCYTES # BLD AUTO: 0 THOUSAND/UL (ref 0–1.22)
MONOCYTES NFR BLD: 0 % (ref 4–12)
NEUTROPHILS # BLD MANUAL: 0.46 THOUSAND/UL (ref 1.85–7.62)
NEUTS SEG NFR BLD AUTO: 59 % (ref 43–75)
PLATELET # BLD AUTO: 70 THOUSANDS/UL (ref 149–390)
PLATELET BLD QL SMEAR: ABNORMAL
PMV BLD AUTO: 12.1 FL (ref 8.9–12.7)
POIKILOCYTOSIS BLD QL SMEAR: PRESENT
POLYCHROMASIA BLD QL SMEAR: PRESENT
POTASSIUM SERPL-SCNC: 3.8 MMOL/L (ref 3.5–5.3)
PROT SERPL-MCNC: 6.3 G/DL (ref 6.4–8.4)
PSA SERPL-MCNC: 1.04 NG/ML (ref 0–4)
RBC # BLD AUTO: 2.2 MILLION/UL (ref 3.88–5.62)
RBC MORPH BLD: PRESENT
SODIUM SERPL-SCNC: 136 MMOL/L (ref 135–147)
TRIGL SERPL-MCNC: 59 MG/DL (ref ?–150)
VARIANT LYMPHS # BLD AUTO: 1 %
WBC # BLD AUTO: 0.78 THOUSAND/UL (ref 4.31–10.16)

## 2025-06-19 PROCEDURE — 80053 COMPREHEN METABOLIC PANEL: CPT

## 2025-06-19 PROCEDURE — 80061 LIPID PANEL: CPT

## 2025-06-19 PROCEDURE — 99214 OFFICE O/P EST MOD 30 MIN: CPT | Performed by: INTERNAL MEDICINE

## 2025-06-19 PROCEDURE — 36415 COLL VENOUS BLD VENIPUNCTURE: CPT

## 2025-06-19 PROCEDURE — 83036 HEMOGLOBIN GLYCOSYLATED A1C: CPT

## 2025-06-19 PROCEDURE — 85027 COMPLETE CBC AUTOMATED: CPT

## 2025-06-19 PROCEDURE — G0103 PSA SCREENING: HCPCS

## 2025-06-19 PROCEDURE — 82043 UR ALBUMIN QUANTITATIVE: CPT

## 2025-06-19 PROCEDURE — G2211 COMPLEX E/M VISIT ADD ON: HCPCS | Performed by: INTERNAL MEDICINE

## 2025-06-19 PROCEDURE — 85007 BL SMEAR W/DIFF WBC COUNT: CPT

## 2025-06-19 PROCEDURE — 82570 ASSAY OF URINE CREATININE: CPT

## 2025-06-19 NOTE — ASSESSMENT & PLAN NOTE
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 The presence of ring chromosomes, EZH2 deletions and ASXL1 mutations are all associated with poor outcome and reduced overall survival in MDS.      High risk according to the IPSS-M, AML transformation risk estimated to be 14% by 1 year.  Median overall survival is estimated to be 1.7 years.    His case was discussed with Dr. Ferrera from the bone marrow transplant team at [facility].  The patient is obviously not a candidate for allogeneic bone marrow transplant.  He was started on azacitidine cycle 1 started on 5/27/2025.  He also was started on venetoclax adjusted dose to 200 mg daily for 2 weeks (dose had to be adjusted down from 400 mg to 200 mg due to the fact that he is on Coreg.)    The patient continues to needed frequent units of packed red blood cells and platelets.  He will be due for cycle 2 of Aza/Konstantin  On 6/23/2025.  The dose of the venetoclax will be 100 mg once a day for 2 weeks.  The dose of the venetoclax may need to be adjusted according to his final blood work.    The patient however seems to be mildly febrile.  He had a temperature of 100.6 during this office visit which persisted for more than 30 minutes.  The patient is entirely asymptomatic.  The patient was told that if his temperature continues to be elevated he should go to the emergency room for broad-spectrum IV antibiotics since he is severely neutropenic.  I did ask him also to repeat his CBC/CMP on Saturday then we will act accordingly.  He will need to be started on triplet antibiotics including  posaconazole, acyclovir and Levaquin starting with cycle 2 for prophylaxis.       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  In the differential diagnosis of this population include monoclonal B-cell lymphocytosis and bone marrow involvement by lymphoma.  A CT scan of the chest abdomen pelvis will be ordered in 2-3 months for close follow-up of the abnormalities seen on the recent CT scan from 3/28/2025 including splenomegaly.       Orders:    CBC and differential; Future    Comprehensive metabolic panel; Future    Magnesium; Future

## 2025-06-19 NOTE — PROGRESS NOTES
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 The presence of ring chromosomes, EZH2 deletions and ASXL1 mutations are all associated with poor outcome and reduced overall survival in MDS.      High risk according to the IPSS-M, AML transformation risk estimated to be 14% by 1 year.  Median overall survival is estimated to be 1.7 years.    His case was discussed with Dr. Ferrera from the bone marrow transplant team at [facility].  The patient is obviously not a candidate for allogeneic bone marrow transplant.  He was started on azacitidine cycle 1 started on 2025.  He also was started on venetoclax adjusted dose to 200 mg daily for 2 weeks (dose had to be adjusted down from 400 mg to 200 mg due to the fact that he is on Coreg.)    The patient continues to needed frequent units of packed red blood cells and platelets.  He will be due for cycle 2 of Aza/Konstantin  On 2025.  The dose of the venetoclax will be 100 mg once a day for 2 weeks.  The dose of the venetoclax may need to be adjusted according to his final blood work.    The patient however seems to be mildly febrile.  He had a temperature of 100.6 during this office visit which persisted for more than 30 minutes.  The patient is entirely asymptomatic.  The patient was told that if his temperature  continues to be elevated he should go to the emergency room for broad-spectrum IV antibiotics since he is severely neutropenic.  I did ask him also to repeat his CBC/CMP on Saturday then we will act accordingly.  He will need to be started on triplet antibiotics including posaconazole, acyclovir and Levaquin starting with cycle 2 for prophylaxis.       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  In the differential diagnosis of this population include monoclonal B-cell lymphocytosis and bone marrow involvement by lymphoma.  A CT scan of the chest abdomen pelvis will be ordered in 2-3 months for close follow-up of the abnormalities seen on the recent CT scan from 3/28/2025 including splenomegaly.       Orders:    CBC and differential; Future    Comprehensive metabolic panel; Future    Magnesium; Future        Return in about 8 weeks (around 8/14/2025) for Office Visit 20 min, Labs, Infusion.    History of Present Illness   Chief Complaint   Patient presents with    Follow-up   This is a 75-year-old with history of coronary artery disease, diabetes mellitus and hypertension.  He reported losing 25 pounds with an the last 2 to 3 months.  He also seems to be very exhausted and very pale.  The patient was found to have significant drop of his hemoglobin level according to the blood work from 3/21/2025 where his hemoglobin was around 7.6 with MCV of 420.  White cell count was 12.4 with a platelet count of 72.  Creatinine was 0.8 with normal calcium and her enzymes.  Vitamin B12 was elevated above 4000.  TSH was normal.  Iron panel showed ferritin of 497 with saturation of 18%.  He did have upper and lower endoscopy on 3/25/2025 which was negative for any obvious malignant process or bleeding.  He denied obvious bleeding from any sites.  He     He did have a CT scan of the abdomen pelvis with contrast on 3/28/2025 which showed  IMPRESSION:     1.  Although there are no  overt CT findings for cirrhosis there are findings for a subtle recanalized umbilical vein. In addition, subtle branching hyperdensity in the left lobe of the liver laterally best seen on the portal venous phase images, matching   hepatic veins in intensity may represent arteriovenous malformation. This could be further evaluated with dedicated liver MRI with and without IV contrast.  2.  Splenomegaly. Shotty appearing retroperitoneal lymph nodes some which are mildly prominent, nonspecific could be reactive. A lymphoproliferative process is not excluded. Recommend follow-up CT in 3-6 months.  3.  Mild pelvic ascites.  4.  Scattered tree-in-bud opacities at the bilateral lung bases suggesting bronchiolitis. There is mild pulmonary nodularity measuring up to 7 mm in size. Based on current Fleischner Society 2017 Guidelines on incidental pulmonary nodule, follow-up   non-contrast chest CT is recommended at 3-6 months from the initial examination and, if stable at that time, an additional follow-up is recommended for 18-24 months from the initial examination.  Ultrasound of the liver and ultrasound elastography was negative for obvious hint of liver cirrhosis.     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 The presence of ring chromosomes, EZH2 deletions and ASXL1 mutations are all associated with poor outcome and reduced overall survival in MDS.   Flow cytometry also detects a minute, monotypic lambda, CD5-negative, [id number] B-cell population.   The differential diagnosis of this population includes a monoclonal B-cell lymphocytosis (MBL) and  bone marrow involvement by lymphoma.       Interval history:  The patient came to for a follow-up visit accompanied by his wife.  He stated he tolerated the first cycle of Vidaza and venetoclax relatively well.  Today in the office his temperature was 100.6.  He is requiring blood transfusions with packed red blood cells and platelets frequently.    Blood work on 6/19/2025 showed hemoglobin of 7.1.  White cell count was 0.7 with platelet count of 70,000.  White cell differential showed ANC of 0.4.  CMP was normal with total bili of 2.9.    Oncology History   Cancer Staging   No matching staging information was found for the patient.  Oncology History   MDS (myelodysplastic syndrome), high grade (HCC)   5/6/2025 Initial Diagnosis    MDS (myelodysplastic syndrome), high grade (HCC)     5/27/2025 -  Chemotherapy    azaCITIDine (VIDAZA) IVPB, 75 mg/m2 = 159 mg, 1 of 6 cycles  Administration: 159 mg (5/27/2025), 159 mg (5/28/2025), 159 mg (5/29/2025), 159 mg (5/30/2025), 159 mg (6/2/2025), 159 mg (6/3/2025), 159 mg (6/4/2025)             Review of Systems   Constitutional:  Positive for fatigue. Negative for chills and fever.   HENT:  Negative for ear pain and sore throat.    Eyes:  Negative for pain and visual disturbance.   Respiratory:  Positive for cough. Negative for shortness of breath.    Cardiovascular:  Negative for chest pain and palpitations.   Gastrointestinal:  Positive for constipation. Negative for abdominal pain and vomiting.   Genitourinary:  Negative for dysuria and hematuria.   Musculoskeletal:  Negative for arthralgias and back pain.   Skin:  Negative for color change and rash.   Neurological:  Negative for seizures and syncope.   All other systems reviewed and are negative.    Medical History Reviewed by provider this encounter:  Tobacco  Allergies  Meds  Problems  Med Hx  Surg Hx  Fam Hx     .  Medications Ordered Prior to Encounter[1]   Social History[2]      Objective   /70 (BP  "Location: Right arm, Patient Position: Sitting, Cuff Size: Adult)   Pulse 87   Temp 100.5 °F (38.1 °C)   Resp 18   Ht 5' 11\" (1.803 m)   Wt 85.3 kg (188 lb)   SpO2 97%   BMI 26.22 kg/m²     Pain Screening:  Pain Score:   2  ECOG   2  Physical Exam  Constitutional:       Appearance: He is well-developed.   HENT:      Head: Normocephalic and atraumatic.      Nose: Nose normal.     Eyes:      General: No scleral icterus.        Right eye: No discharge.         Left eye: No discharge.      Conjunctiva/sclera: Conjunctivae normal.      Pupils: Pupils are equal, round, and reactive to light.     Neck:      Thyroid: No thyromegaly.      Trachea: No tracheal deviation.     Cardiovascular:      Rate and Rhythm: Normal rate and regular rhythm.      Heart sounds: Normal heart sounds. No murmur heard.     No friction rub.   Pulmonary:      Effort: Pulmonary effort is normal. No respiratory distress.      Breath sounds: Normal breath sounds. No wheezing or rales.   Chest:      Chest wall: No tenderness.   Abdominal:      General: Bowel sounds are normal. There is no distension.      Palpations: Abdomen is soft. There is no hepatomegaly or splenomegaly.      Tenderness: There is no abdominal tenderness. There is no guarding or rebound.     Musculoskeletal:         General: No tenderness or deformity. Normal range of motion.      Cervical back: Normal range of motion and neck supple.   Lymphadenopathy:      Cervical: No cervical adenopathy.     Skin:     General: Skin is warm and dry.      Coloration: Skin is not pale.      Findings: No erythema or rash.     Neurological:      Mental Status: He is alert and oriented to person, place, and time.      Cranial Nerves: No cranial nerve deficit.      Coordination: Coordination normal.      Deep Tendon Reflexes: Reflexes are normal and symmetric.     Psychiatric:         Behavior: Behavior normal.         Thought Content: Thought content normal.         Judgment: Judgment normal. "         Labs: I have reviewed the following labs:  Lab Results   Component Value Date/Time    WBC 0.78 (L) 06/19/2025 08:26 AM    RBC 2.20 (L) 06/19/2025 08:26 AM    Hemoglobin 7.1 (L) 06/19/2025 08:26 AM    Hematocrit 24.1 (L) 06/19/2025 08:26 AM     (H) 06/19/2025 08:26 AM    MCH 32.3 06/19/2025 08:26 AM    RDW 25.8 (H) 06/19/2025 08:26 AM    Platelets 70 (L) 06/19/2025 08:26 AM    Segmented % 73 10/07/2024 12:53 PM    Lymphocytes % 39 06/19/2025 08:26 AM    Lymphocytes % 20 10/07/2024 12:53 PM    Monocytes % 0 (L) 06/19/2025 08:26 AM    Monocytes % 4 10/07/2024 12:53 PM    Eosinophils % 1 06/19/2025 08:26 AM    Eosinophils Relative 3 10/07/2024 12:53 PM    Basophils % 0 06/19/2025 08:26 AM    Basophils Relative 0 10/07/2024 12:53 PM    Immature Grans % 0 10/07/2024 12:53 PM    Absolute Neutrophils 4.21 04/21/2025 07:58 AM    Absolute Neutrophils 3.47 10/07/2024 12:53 PM     Lab Results   Component Value Date/Time    Potassium 3.8 06/19/2025 08:26 AM    Chloride 104 06/19/2025 08:26 AM    CO2 25 06/19/2025 08:26 AM    BUN 11 06/19/2025 08:26 AM    Creatinine 0.71 06/19/2025 08:26 AM    Glucose, Fasting 177 (H) 06/19/2025 08:26 AM    Calcium 9.1 06/19/2025 08:26 AM    Corrected Calcium 9.4 05/27/2025 10:53 AM    AST 6 (L) 06/19/2025 08:26 AM    ALT 5 (L) 06/19/2025 08:26 AM    Alkaline Phosphatase 42 06/19/2025 08:26 AM    Total Protein 6.3 (L) 06/19/2025 08:26 AM    Albumin 3.7 06/19/2025 08:26 AM    Total Bilirubin 2.95 (H) 06/19/2025 08:26 AM    eGFR 92 06/19/2025 08:26 AM     Lab Results   Component Value Date/Time    WBC 0.78 (L) 06/19/2025 08:26 AM    RBC 2.20 (L) 06/19/2025 08:26 AM    Hemoglobin 7.1 (L) 06/19/2025 08:26 AM    Hematocrit 24.1 (L) 06/19/2025 08:26 AM     (H) 06/19/2025 08:26 AM    MCH 32.3 06/19/2025 08:26 AM    RDW 25.8 (H) 06/19/2025 08:26 AM    Platelets 70 (L) 06/19/2025 08:26 AM    Segmented % 73 10/07/2024 12:53 PM    Bands % 4 06/16/2025 08:34 AM    Lymphocytes % 39  06/19/2025 08:26 AM    Lymphocytes % 20 10/07/2024 12:53 PM    Monocytes % 0 (L) 06/19/2025 08:26 AM    Monocytes % 4 10/07/2024 12:53 PM    Eosinophils % 1 06/19/2025 08:26 AM    Eosinophils Relative 3 10/07/2024 12:53 PM    Basophils % 0 06/19/2025 08:26 AM    Basophils Relative 0 10/07/2024 12:53 PM    Immature Grans % 0 10/07/2024 12:53 PM    Absolute Neutrophils 4.21 04/21/2025 07:58 AM    Absolute Neutrophils 3.47 10/07/2024 12:53 PM      Lab Results   Component Value Date/Time    Sodium 136 06/19/2025 08:26 AM    Potassium 3.8 06/19/2025 08:26 AM    Chloride 104 06/19/2025 08:26 AM    CO2 25 06/19/2025 08:26 AM    ANION GAP 7 06/19/2025 08:26 AM    BUN 11 06/19/2025 08:26 AM    Creatinine 0.71 06/19/2025 08:26 AM    Glucose 138 06/09/2025 09:31 AM    Glucose, Fasting 177 (H) 06/19/2025 08:26 AM    Calcium 9.1 06/19/2025 08:26 AM    Corrected Calcium 9.4 05/27/2025 10:53 AM    AST 6 (L) 06/19/2025 08:26 AM    ALT 5 (L) 06/19/2025 08:26 AM    Alkaline Phosphatase 42 06/19/2025 08:26 AM    Total Protein 6.3 (L) 06/19/2025 08:26 AM    Albumin 3.7 06/19/2025 08:26 AM    Total Bilirubin 2.95 (H) 06/19/2025 08:26 AM    eGFR 92 06/19/2025 08:26 AM      Lab Results   Component Value Date/Time    Iron 56 04/08/2025 01:08 PM    Iron Saturation 25 04/08/2025 01:08 PM    Ferritin 397 (H) 04/08/2025 01:08 PM    Vitamin B-12 3,721 (H) 04/08/2025 01:08 PM    Folate 6.8 03/21/2025 01:18 PM    Erythropoietin 1011.0 (H) 04/08/2025 01:08 PM    Sed Rate 24 (H) 04/08/2025 01:08 PM    CRP 67.5 (H) 04/08/2025 01:08 PM      Results for orders placed or performed in visit on 06/19/25   Hemoglobin A1C   Result Value Ref Range    Hemoglobin A1C 4.6 Normal 4.0-5.6%; PreDiabetic 5.7-6.4%; Diabetic >=6.5%; Glycemic control for adults with diabetes <7.0% %    EAG 85 mg/dl   CBC and differential   Result Value Ref Range    WBC 0.78 (L) 4.31 - 10.16 Thousand/uL    RBC 2.20 (L) 3.88 - 5.62 Million/uL    Hemoglobin 7.1 (L) 12.0 - 17.0 g/dL     Hematocrit 24.1 (L) 36.5 - 49.3 %     (H) 82 - 98 fL    MCH 32.3 26.8 - 34.3 pg    MCHC 29.5 (L) 31.4 - 37.4 g/dL    RDW 25.8 (H) 11.6 - 15.1 %    MPV 12.1 8.9 - 12.7 fL    Platelets 70 (L) 149 - 390 Thousands/uL   Lipid Panel with Direct LDL reflex   Result Value Ref Range    Cholesterol 104 See Comment mg/dL    Triglycerides 59 See Comment mg/dL    HDL, Direct 39 (L) >=40 mg/dL    LDL Calculated 53 0 - 100 mg/dL   PSA, Total Screen   Result Value Ref Range    PSA 1.040 0.000 - 4.000 ng/mL   Manual Differential(PHLEBS Do Not Order)   Result Value Ref Range    Segmented % 59 43 - 75 %    Lymphocytes % 39 14 - 44 %    Monocytes % 0 (L) 4 - 12 %    Eosinophils % 1 0 - 6 %    Basophils % 0 0 - 1 %    Atypical Lymphocytes % 1 (H) <=0 %    Absolute Neutrophils 0.46 (L) 1.85 - 7.62 Thousand/uL    Absolute Lymphocytes 0.31 (L) 0.60 - 4.47 Thousand/uL    Absolute Monocytes 0.00 0.00 - 1.22 Thousand/uL    Absolute Eosinophils 0.01 0.00 - 0.40 Thousand/uL    Absolute Basophils 0.00 0.00 - 0.10 Thousand/uL    Total Counted      RBC Morphology Present     Platelet Estimate Decreased (A) Adequate    Anisocytosis Present     Macrocytes Present     Microcytes Present     Poikilocytes Present     Polychromasia Present                    [1]   Current Outpatient Medications on File Prior to Visit   Medication Sig Dispense Refill    acyclovir (ZOVIRAX) 400 MG tablet Take 1 tablet (400 mg total) by mouth 2 (two) times a day 180 tablet 3    carvedilol (COREG) 3.125 mg tablet Take 1 tablet (3.125 mg total) by mouth 2 (two) times a day with meals 180 tablet 3    Cholecalciferol (D3-1000) 25 MCG (1000 UT) capsule Take 1,000 Units by mouth in the morning.      levofloxacin (LEVAQUIN) 500 mg tablet Take 1 tablet (500 mg total) by mouth every 24 hours 30 tablet 11    omeprazole (PriLOSEC) 40 MG capsule TAKE 1 CAPSULE (40 MG TOTAL) BY MOUTH DAILY. 90 capsule 1    ondansetron (ZOFRAN) 8 mg tablet Take 1 tablet (8 mg total) by mouth  every 8 (eight) hours as needed for nausea or vomiting 45 tablet 1    posaconazole (NOXAFIL) 100 MG delayed-release tablet Take 3 tablets (300 mg total) by mouth daily 90 tablet 11    Rosuvastatin Calcium 20 MG CPSP Take 1 capsule by mouth in the morning 90 capsule 3    tamsulosin (FLOMAX) 0.4 mg Take 1 capsule (0.4 mg total) by mouth daily with dinner 100 capsule 3    Venetoclax 100 MG TABS Take 1 tablet (100 mg total) by mouth daily For 14 days starting with Cycle 2 14 tablet 5    Aspirin 81 MG CAPS Take 1 capsule by mouth in the morning (Patient not taking: Reported on 2025)      Cyanocobalamin (B-12) 1000 MCG TABS Place 1 tablet under the tongue in the morning (Patient not taking: Reported on 2025)      hydrocortisone 1 % cream Apply topically 4 (four) times a day as needed for irritation or rash (Patient not taking: Reported on 2025) 14 g 0    Venetoclax 100 MG TABS Take 2 tablets (200 mg total) by mouth daily For 14 days with Cycle 1 (Patient not taking: Reported on 2025) 28 tablet 0     Current Facility-Administered Medications on File Prior to Visit   Medication Dose Route Frequency Provider Last Rate Last Admin    alteplase (CATHFLO) injection 2 mg  2 mg Intracatheter Q1MIN PRN Navarro Woods MD       [2]   Social History  Tobacco Use    Smoking status: Former     Current packs/day: 0.00     Average packs/day: 1.5 packs/day for 20.0 years (30.0 ttl pk-yrs)     Types: Cigarettes     Start date: 1970     Quit date: 1990     Years since quittin.4    Smokeless tobacco: Never   Vaping Use    Vaping status: Never Used   Substance and Sexual Activity    Alcohol use: Not Currently    Drug use: Never    Sexual activity: Not Currently     Partners: Female

## 2025-06-20 ENCOUNTER — TELEPHONE (OUTPATIENT)
Dept: HEMATOLOGY ONCOLOGY | Facility: CLINIC | Age: 75
End: 2025-06-20

## 2025-06-20 NOTE — TELEPHONE ENCOUNTER
Kendra WELCH patient had 100.5 yesterday in the office. We were unsure if this was due to it being hot outside. I checked it 3 different times with different thermometers.

## 2025-06-20 NOTE — TELEPHONE ENCOUNTER
Phoned pt to check on how he was feeling. Pt denied any temperature states on his thermometer no fever. Pt feels fine and is going for labs tomorrow to see if he is good for tx on Mon.

## 2025-06-20 NOTE — PROGRESS NOTES
Labs from 6/17 noted. Per inbasket with cheryl irizarry rn, pt will have repeat labs Saturday 6/21/25

## 2025-06-21 ENCOUNTER — APPOINTMENT (OUTPATIENT)
Dept: LAB | Facility: HOSPITAL | Age: 75
End: 2025-06-21
Payer: MEDICARE

## 2025-06-21 DIAGNOSIS — D46.Z MDS (MYELODYSPLASTIC SYNDROME), HIGH GRADE (HCC): ICD-10-CM

## 2025-06-21 LAB
ALBUMIN SERPL BCG-MCNC: 3.6 G/DL (ref 3.5–5)
ALP SERPL-CCNC: 44 U/L (ref 34–104)
ALT SERPL W P-5'-P-CCNC: 8 U/L (ref 7–52)
ANION GAP SERPL CALCULATED.3IONS-SCNC: 7 MMOL/L (ref 4–13)
ANISOCYTOSIS BLD QL SMEAR: PRESENT
AST SERPL W P-5'-P-CCNC: 7 U/L (ref 13–39)
BASOPHILS # BLD MANUAL: 0 THOUSAND/UL (ref 0–0.1)
BASOPHILS NFR MAR MANUAL: 0 % (ref 0–1)
BILIRUB SERPL-MCNC: 2.5 MG/DL (ref 0.2–1)
BUN SERPL-MCNC: 12 MG/DL (ref 5–25)
CALCIUM SERPL-MCNC: 9.1 MG/DL (ref 8.4–10.2)
CHLORIDE SERPL-SCNC: 104 MMOL/L (ref 96–108)
CO2 SERPL-SCNC: 27 MMOL/L (ref 21–32)
CREAT SERPL-MCNC: 0.71 MG/DL (ref 0.6–1.3)
EOSINOPHIL # BLD MANUAL: 0.01 THOUSAND/UL (ref 0–0.4)
EOSINOPHIL NFR BLD MANUAL: 1 % (ref 0–6)
ERYTHROCYTE [DISTWIDTH] IN BLOOD BY AUTOMATED COUNT: 23.7 % (ref 11.6–15.1)
GFR SERPL CREATININE-BSD FRML MDRD: 92 ML/MIN/1.73SQ M
GLUCOSE P FAST SERPL-MCNC: 157 MG/DL (ref 65–99)
HCT VFR BLD AUTO: 22.8 % (ref 36.5–49.3)
HGB BLD-MCNC: 6.8 G/DL (ref 12–17)
HYPERCHROMIA BLD QL SMEAR: PRESENT
LYMPHOCYTES # BLD AUTO: 0.28 THOUSAND/UL (ref 0.6–4.47)
LYMPHOCYTES # BLD AUTO: 45 % (ref 14–44)
MACROCYTES BLD QL AUTO: PRESENT
MAGNESIUM SERPL-MCNC: 1.8 MG/DL (ref 1.9–2.7)
MCH RBC QN AUTO: 32.9 PG (ref 26.8–34.3)
MCHC RBC AUTO-ENTMCNC: 29.8 G/DL (ref 31.4–37.4)
MCV RBC AUTO: 110 FL (ref 82–98)
METAMYELOCYTE ABSOLUTE CT: 0.01 THOUSAND/UL (ref 0–0.1)
METAMYELOCYTES NFR BLD MANUAL: 1 % (ref 0–1)
MICROCYTES BLD QL AUTO: PRESENT
MONOCYTES # BLD AUTO: 0 THOUSAND/UL (ref 0–1.22)
MONOCYTES NFR BLD: 0 % (ref 4–12)
NEUTROPHILS # BLD MANUAL: 0.33 THOUSAND/UL (ref 1.85–7.62)
NEUTS BAND NFR BLD MANUAL: 3 % (ref 0–8)
NEUTS SEG NFR BLD AUTO: 50 % (ref 43–75)
PLATELET # BLD AUTO: 104 THOUSANDS/UL (ref 149–390)
PLATELET BLD QL SMEAR: ADEQUATE
PMV BLD AUTO: 12.2 FL (ref 8.9–12.7)
POIKILOCYTOSIS BLD QL SMEAR: PRESENT
POLYCHROMASIA BLD QL SMEAR: PRESENT
POTASSIUM SERPL-SCNC: 3.8 MMOL/L (ref 3.5–5.3)
PROT SERPL-MCNC: 6.2 G/DL (ref 6.4–8.4)
RBC # BLD AUTO: 2.07 MILLION/UL (ref 3.88–5.62)
RBC MORPH BLD: PRESENT
SODIUM SERPL-SCNC: 138 MMOL/L (ref 135–147)
WBC # BLD AUTO: 0.63 THOUSAND/UL (ref 4.31–10.16)

## 2025-06-21 PROCEDURE — 86923 COMPATIBILITY TEST ELECTRIC: CPT

## 2025-06-21 PROCEDURE — 85007 BL SMEAR W/DIFF WBC COUNT: CPT

## 2025-06-21 PROCEDURE — 85027 COMPLETE CBC AUTOMATED: CPT

## 2025-06-21 PROCEDURE — 80053 COMPREHEN METABOLIC PANEL: CPT

## 2025-06-21 PROCEDURE — 86850 RBC ANTIBODY SCREEN: CPT | Performed by: INTERNAL MEDICINE

## 2025-06-21 PROCEDURE — 36415 COLL VENOUS BLD VENIPUNCTURE: CPT

## 2025-06-21 PROCEDURE — 83735 ASSAY OF MAGNESIUM: CPT

## 2025-06-21 PROCEDURE — 86900 BLOOD TYPING SEROLOGIC ABO: CPT | Performed by: INTERNAL MEDICINE

## 2025-06-21 PROCEDURE — 86901 BLOOD TYPING SEROLOGIC RH(D): CPT | Performed by: INTERNAL MEDICINE

## 2025-06-23 ENCOUNTER — TELEPHONE (OUTPATIENT)
Age: 75
End: 2025-06-23

## 2025-06-23 ENCOUNTER — HOSPITAL ENCOUNTER (OUTPATIENT)
Dept: INFUSION CENTER | Facility: HOSPITAL | Age: 75
Discharge: HOME | End: 2025-06-23
Attending: INTERNAL MEDICINE
Payer: MEDICARE

## 2025-06-23 ENCOUNTER — TELEPHONE (OUTPATIENT)
Dept: HEMATOLOGY ONCOLOGY | Facility: CLINIC | Age: 75
End: 2025-06-23

## 2025-06-23 VITALS
DIASTOLIC BLOOD PRESSURE: 53 MMHG | HEART RATE: 78 BPM | TEMPERATURE: 99.9 F | OXYGEN SATURATION: 100 % | SYSTOLIC BLOOD PRESSURE: 108 MMHG | RESPIRATION RATE: 16 BRPM

## 2025-06-23 DIAGNOSIS — D46.Z MDS (MYELODYSPLASTIC SYNDROME), HIGH GRADE (HCC): Primary | ICD-10-CM

## 2025-06-23 DIAGNOSIS — D64.9 ACUTE ANEMIA: Primary | ICD-10-CM

## 2025-06-23 PROCEDURE — 36430 TRANSFUSION BLD/BLD COMPNT: CPT

## 2025-06-23 PROCEDURE — P9040 RBC LEUKOREDUCED IRRADIATED: HCPCS

## 2025-06-23 RX ORDER — SODIUM CHLORIDE 9 MG/ML
20 INJECTION, SOLUTION INTRAVENOUS ONCE
Status: CANCELLED | OUTPATIENT
Start: 2025-06-23

## 2025-06-23 RX ORDER — SODIUM CHLORIDE 9 MG/ML
20 INJECTION, SOLUTION INTRAVENOUS ONCE
Status: COMPLETED | OUTPATIENT
Start: 2025-06-23 | End: 2025-06-23

## 2025-06-23 RX ORDER — SODIUM CHLORIDE 9 MG/ML
20 INJECTION, SOLUTION INTRAVENOUS ONCE
OUTPATIENT
Start: 2025-06-30

## 2025-06-23 RX ADMIN — SODIUM CHLORIDE 20 ML/HR: 0.9 INJECTION, SOLUTION INTRAVENOUS at 11:16

## 2025-06-23 NOTE — TELEPHONE ENCOUNTER
Phoned pt and I had spoke to him after the message I left and answered questions about meds that he was supposed to take before cycle 2 and will have CBC repeated on Thurs chemo delayed by one week and Vidaza dose has been decreased.

## 2025-06-23 NOTE — TELEPHONE ENCOUNTER
Pt is requesting a call back from JD Gardner because he has more questions to ask her. He would like a call back to [phone]. Thank you.

## 2025-06-23 NOTE — PROGRESS NOTES
Patient lab work reviewed from 6/21 for C2 Vidaza. Lab work does not meet parameter's. Dr. Woods's office contacted. Per the office, hold Vidaza and give 1 unit RBC's. Office will call the patient.

## 2025-06-23 NOTE — PROGRESS NOTES
Rikki Reyes  tolerated treatment well with no complications.      Rikki Reyes is aware of future appt on 6/30 at 1030.     AVS printed and given to Rikki Reyes:  No (Declined by Rikki Reyes)

## 2025-06-23 NOTE — TELEPHONE ENCOUNTER
Phoned pt and left message that he can not have chemo due to neutropenia and delaying treatment by one week. Will phone  inf to see if there is room for blood transfusion as hgb is 6.8. Cycle 2 chemo will have decreased dose fo Vidaza to 50 mg/m2.

## 2025-06-24 ENCOUNTER — HOSPITAL ENCOUNTER (OUTPATIENT)
Dept: INFUSION CENTER | Facility: HOSPITAL | Age: 75
End: 2025-06-24
Attending: INTERNAL MEDICINE

## 2025-06-26 ENCOUNTER — APPOINTMENT (OUTPATIENT)
Dept: LAB | Facility: HOSPITAL | Age: 75
End: 2025-06-26
Attending: INTERNAL MEDICINE
Payer: MEDICARE

## 2025-06-26 DIAGNOSIS — D46.Z MDS (MYELODYSPLASTIC SYNDROME), HIGH GRADE (HCC): ICD-10-CM

## 2025-06-26 LAB
ALBUMIN SERPL BCG-MCNC: 3.3 G/DL (ref 3.5–5)
ALP SERPL-CCNC: 47 U/L (ref 34–104)
ALT SERPL W P-5'-P-CCNC: 19 U/L (ref 7–52)
ANION GAP SERPL CALCULATED.3IONS-SCNC: 7 MMOL/L (ref 4–13)
ANISOCYTOSIS BLD QL SMEAR: PRESENT
AST SERPL W P-5'-P-CCNC: 10 U/L (ref 13–39)
BASOPHILS # BLD MANUAL: 0 THOUSAND/UL (ref 0–0.1)
BASOPHILS NFR MAR MANUAL: 0 % (ref 0–1)
BILIRUB SERPL-MCNC: 1.7 MG/DL (ref 0.2–1)
BUN SERPL-MCNC: 8 MG/DL (ref 5–25)
CALCIUM ALBUM COR SERPL-MCNC: 9.7 MG/DL (ref 8.3–10.1)
CALCIUM SERPL-MCNC: 9.1 MG/DL (ref 8.4–10.2)
CHLORIDE SERPL-SCNC: 105 MMOL/L (ref 96–108)
CO2 SERPL-SCNC: 28 MMOL/L (ref 21–32)
CREAT SERPL-MCNC: 0.68 MG/DL (ref 0.6–1.3)
EOSINOPHIL # BLD MANUAL: 0 THOUSAND/UL (ref 0–0.4)
EOSINOPHIL NFR BLD MANUAL: 0 % (ref 0–6)
ERYTHROCYTE [DISTWIDTH] IN BLOOD BY AUTOMATED COUNT: 21.4 % (ref 11.6–15.1)
GFR SERPL CREATININE-BSD FRML MDRD: 94 ML/MIN/1.73SQ M
GLUCOSE P FAST SERPL-MCNC: 136 MG/DL (ref 65–99)
HCT VFR BLD AUTO: 25.8 % (ref 36.5–49.3)
HGB BLD-MCNC: 7.6 G/DL (ref 12–17)
HYPERCHROMIA BLD QL SMEAR: PRESENT
LG PLATELETS BLD QL SMEAR: PRESENT
LYMPHOCYTES # BLD AUTO: 0.4 THOUSAND/UL (ref 0.6–4.47)
LYMPHOCYTES # BLD AUTO: 31 % (ref 14–44)
MACROCYTES BLD QL AUTO: PRESENT
MCH RBC QN AUTO: 31.3 PG (ref 26.8–34.3)
MCHC RBC AUTO-ENTMCNC: 29.5 G/DL (ref 31.4–37.4)
MCV RBC AUTO: 106 FL (ref 82–98)
MICROCYTES BLD QL AUTO: PRESENT
MONOCYTES # BLD AUTO: 0.01 THOUSAND/UL (ref 0–1.22)
MONOCYTES NFR BLD: 1 % (ref 4–12)
NEUTROPHILS # BLD MANUAL: 0.88 THOUSAND/UL (ref 1.85–7.62)
NEUTS BAND NFR BLD MANUAL: 1 % (ref 0–8)
NEUTS SEG NFR BLD AUTO: 67 % (ref 43–75)
PLATELET # BLD AUTO: 124 THOUSANDS/UL (ref 149–390)
PLATELET BLD QL SMEAR: ADEQUATE
PMV BLD AUTO: 12.7 FL (ref 8.9–12.7)
POIKILOCYTOSIS BLD QL SMEAR: PRESENT
POLYCHROMASIA BLD QL SMEAR: PRESENT
POTASSIUM SERPL-SCNC: 3.2 MMOL/L (ref 3.5–5.3)
PROT SERPL-MCNC: 5.8 G/DL (ref 6.4–8.4)
RBC # BLD AUTO: 2.43 MILLION/UL (ref 3.88–5.62)
RBC MORPH BLD: PRESENT
SODIUM SERPL-SCNC: 140 MMOL/L (ref 135–147)
WBC # BLD AUTO: 1.3 THOUSAND/UL (ref 4.31–10.16)

## 2025-06-26 PROCEDURE — 85027 COMPLETE CBC AUTOMATED: CPT

## 2025-06-26 PROCEDURE — 80053 COMPREHEN METABOLIC PANEL: CPT

## 2025-06-26 PROCEDURE — 85007 BL SMEAR W/DIFF WBC COUNT: CPT

## 2025-06-26 PROCEDURE — 36415 COLL VENOUS BLD VENIPUNCTURE: CPT

## 2025-06-27 ENCOUNTER — HOSPITAL ENCOUNTER (OUTPATIENT)
Dept: INFUSION CENTER | Facility: HOSPITAL | Age: 75
End: 2025-06-27
Attending: INTERNAL MEDICINE

## 2025-06-30 ENCOUNTER — TELEPHONE (OUTPATIENT)
Age: 75
End: 2025-06-30

## 2025-06-30 ENCOUNTER — HOSPITAL ENCOUNTER (OUTPATIENT)
Dept: INFUSION CENTER | Facility: HOSPITAL | Age: 75
Discharge: HOME | End: 2025-06-30
Attending: INTERNAL MEDICINE
Payer: MEDICARE

## 2025-06-30 VITALS
HEART RATE: 62 BPM | DIASTOLIC BLOOD PRESSURE: 69 MMHG | TEMPERATURE: 97 F | SYSTOLIC BLOOD PRESSURE: 113 MMHG | RESPIRATION RATE: 18 BRPM

## 2025-06-30 DIAGNOSIS — D46.Z MDS (MYELODYSPLASTIC SYNDROME), HIGH GRADE (HCC): ICD-10-CM

## 2025-06-30 DIAGNOSIS — E87.6 HYPOKALEMIA: ICD-10-CM

## 2025-06-30 DIAGNOSIS — D46.Z MDS (MYELODYSPLASTIC SYNDROME), HIGH GRADE (HCC): Primary | ICD-10-CM

## 2025-06-30 DIAGNOSIS — E87.6 HYPOKALEMIA: Primary | ICD-10-CM

## 2025-06-30 LAB
ANISOCYTOSIS BLD QL SMEAR: PRESENT
BASOPHILS # BLD MANUAL: 0 THOUSAND/UL (ref 0–0.1)
BASOPHILS NFR MAR MANUAL: 0 % (ref 0–1)
EOSINOPHIL # BLD MANUAL: 0.05 THOUSAND/UL (ref 0–0.4)
EOSINOPHIL NFR BLD MANUAL: 2 % (ref 0–6)
ERYTHROCYTE [DISTWIDTH] IN BLOOD BY AUTOMATED COUNT: 22.3 % (ref 11.6–15.1)
HCT VFR BLD AUTO: 26.3 % (ref 36.5–49.3)
HGB BLD-MCNC: 7.4 G/DL (ref 12–17)
LYMPHOCYTES # BLD AUTO: 0.55 THOUSAND/UL (ref 0.6–4.47)
LYMPHOCYTES # BLD AUTO: 24 % (ref 14–44)
MACROCYTES BLD QL AUTO: PRESENT
MCH RBC QN AUTO: 31.1 PG (ref 26.8–34.3)
MCHC RBC AUTO-ENTMCNC: 28.1 G/DL (ref 31.4–37.4)
MCV RBC AUTO: 111 FL (ref 82–98)
METAMYELOCYTE ABSOLUTE CT: 0.02 THOUSAND/UL (ref 0–0.1)
METAMYELOCYTES NFR BLD MANUAL: 1 % (ref 0–1)
MICROCYTES BLD QL AUTO: PRESENT
MONOCYTES # BLD AUTO: 0.02 THOUSAND/UL (ref 0–1.22)
MONOCYTES NFR BLD: 1 % (ref 4–12)
NEUTROPHILS # BLD MANUAL: 1.66 THOUSAND/UL (ref 1.85–7.62)
NEUTS SEG NFR BLD AUTO: 72 % (ref 43–75)
OVALOCYTES BLD QL SMEAR: PRESENT
PLATELET # BLD AUTO: 132 THOUSANDS/UL (ref 149–390)
PLATELET BLD QL SMEAR: ADEQUATE
PMV BLD AUTO: 12.9 FL (ref 8.9–12.7)
POIKILOCYTOSIS BLD QL SMEAR: PRESENT
POLYCHROMASIA BLD QL SMEAR: PRESENT
RBC # BLD AUTO: 2.38 MILLION/UL (ref 3.88–5.62)
RBC MORPH BLD: PRESENT
WBC # BLD AUTO: 2.3 THOUSAND/UL (ref 4.31–10.16)

## 2025-06-30 PROCEDURE — 96365 THER/PROPH/DIAG IV INF INIT: CPT

## 2025-06-30 PROCEDURE — 85007 BL SMEAR W/DIFF WBC COUNT: CPT | Performed by: INTERNAL MEDICINE

## 2025-06-30 PROCEDURE — 85027 COMPLETE CBC AUTOMATED: CPT | Performed by: INTERNAL MEDICINE

## 2025-06-30 PROCEDURE — 96366 THER/PROPH/DIAG IV INF ADDON: CPT

## 2025-06-30 RX ADMIN — POTASSIUM CHLORIDE: 2 INJECTION, SOLUTION, CONCENTRATE INTRAVENOUS at 11:02

## 2025-06-30 NOTE — TELEPHONE ENCOUNTER
Patient would like to know how to proceed with the Vanetoclax since treatment was cancelled this week. Please return call to clarify with patient at [phone]

## 2025-06-30 NOTE — PROGRESS NOTES
IV Potassium given without incident. Pateint is aware of his next appt on 7-7-25 at 9am AVS declined.

## 2025-06-30 NOTE — PROGRESS NOTES
Patient was scheduled for Vidaza this week but labs do not meet parameters. Treatment was delayed x 1 week per Kendra Goins RN He is here for IV potassium today. K is 3.1. Our pharmacist Snehal Glass was also made aware.

## 2025-07-01 ENCOUNTER — TELEPHONE (OUTPATIENT)
Age: 75
End: 2025-07-01

## 2025-07-01 ENCOUNTER — HOSPITAL ENCOUNTER (OUTPATIENT)
Dept: INFUSION CENTER | Facility: HOSPITAL | Age: 75
End: 2025-07-01
Attending: INTERNAL MEDICINE

## 2025-07-01 NOTE — TELEPHONE ENCOUNTER
Phoned pt and let him know that as long as IV chemo is being held the Venetoclax needs to be held as well,.

## 2025-07-01 NOTE — TELEPHONE ENCOUNTER
Returned phone call to patient. Patient reports he called in yesterday, inquiring if he should hold his Venetoclax, since treatment was deferred. I made him aware we are still waiting for an answer will return his call. He verbalized understanding and has no additional questions or concerns at this time.

## 2025-07-03 ENCOUNTER — HOSPITAL ENCOUNTER (OUTPATIENT)
Dept: INFUSION CENTER | Facility: HOSPITAL | Age: 75
Discharge: HOME | End: 2025-07-03
Attending: INTERNAL MEDICINE

## 2025-07-03 ENCOUNTER — APPOINTMENT (OUTPATIENT)
Dept: LAB | Facility: HOSPITAL | Age: 75
End: 2025-07-03
Attending: INTERNAL MEDICINE
Payer: MEDICARE

## 2025-07-03 DIAGNOSIS — D46.Z MDS (MYELODYSPLASTIC SYNDROME), HIGH GRADE (HCC): ICD-10-CM

## 2025-07-03 LAB
ALBUMIN SERPL BCG-MCNC: 3.5 G/DL (ref 3.5–5)
ALP SERPL-CCNC: 39 U/L (ref 34–104)
ALT SERPL W P-5'-P-CCNC: 6 U/L (ref 7–52)
ANION GAP SERPL CALCULATED.3IONS-SCNC: 5 MMOL/L (ref 4–13)
ANISOCYTOSIS BLD QL SMEAR: PRESENT
AST SERPL W P-5'-P-CCNC: 9 U/L (ref 13–39)
BASOPHILS # BLD MANUAL: 0 THOUSAND/UL (ref 0–0.1)
BASOPHILS NFR MAR MANUAL: 0 % (ref 0–1)
BILIRUB SERPL-MCNC: 2.42 MG/DL (ref 0.2–1)
BUN SERPL-MCNC: 8 MG/DL (ref 5–25)
CALCIUM SERPL-MCNC: 8.9 MG/DL (ref 8.4–10.2)
CHLORIDE SERPL-SCNC: 108 MMOL/L (ref 96–108)
CO2 SERPL-SCNC: 28 MMOL/L (ref 21–32)
CREAT SERPL-MCNC: 0.67 MG/DL (ref 0.6–1.3)
EOSINOPHIL # BLD MANUAL: 0 THOUSAND/UL (ref 0–0.4)
EOSINOPHIL NFR BLD MANUAL: 0 % (ref 0–6)
ERYTHROCYTE [DISTWIDTH] IN BLOOD BY AUTOMATED COUNT: 23.2 % (ref 11.6–15.1)
GFR SERPL CREATININE-BSD FRML MDRD: 94 ML/MIN/1.73SQ M
GIANT PLATELETS BLD QL SMEAR: PRESENT
GLUCOSE P FAST SERPL-MCNC: 187 MG/DL (ref 65–99)
HCT VFR BLD AUTO: 29.2 % (ref 36.5–49.3)
HGB BLD-MCNC: 8.4 G/DL (ref 12–17)
LYMPHOCYTES # BLD AUTO: 0.56 THOUSAND/UL (ref 0.6–4.47)
LYMPHOCYTES # BLD AUTO: 20 % (ref 14–44)
MACROCYTES BLD QL AUTO: PRESENT
MCH RBC QN AUTO: 31.7 PG (ref 26.8–34.3)
MCHC RBC AUTO-ENTMCNC: 28.8 G/DL (ref 31.4–37.4)
MCV RBC AUTO: 110 FL (ref 82–98)
MONOCYTES # BLD AUTO: 0.03 THOUSAND/UL (ref 0–1.22)
MONOCYTES NFR BLD: 1 % (ref 4–12)
NEUTROPHILS # BLD MANUAL: 2.08 THOUSAND/UL (ref 1.85–7.62)
NEUTS BAND NFR BLD MANUAL: 1 % (ref 0–8)
NEUTS SEG NFR BLD AUTO: 77 % (ref 43–75)
OVALOCYTES BLD QL SMEAR: PRESENT
PLATELET # BLD AUTO: 105 THOUSANDS/UL (ref 149–390)
PLATELET BLD QL SMEAR: ABNORMAL
PMV BLD AUTO: 12.7 FL (ref 8.9–12.7)
POIKILOCYTOSIS BLD QL SMEAR: PRESENT
POLYCHROMASIA BLD QL SMEAR: PRESENT
POTASSIUM SERPL-SCNC: 3.2 MMOL/L (ref 3.5–5.3)
PROT SERPL-MCNC: 5.8 G/DL (ref 6.4–8.4)
RBC # BLD AUTO: 2.65 MILLION/UL (ref 3.88–5.62)
RBC MORPH BLD: PRESENT
SODIUM SERPL-SCNC: 141 MMOL/L (ref 135–147)
VARIANT LYMPHS # BLD AUTO: 1 %
WBC # BLD AUTO: 2.67 THOUSAND/UL (ref 4.31–10.16)

## 2025-07-03 PROCEDURE — 36415 COLL VENOUS BLD VENIPUNCTURE: CPT

## 2025-07-03 PROCEDURE — 80053 COMPREHEN METABOLIC PANEL: CPT

## 2025-07-03 PROCEDURE — 85007 BL SMEAR W/DIFF WBC COUNT: CPT

## 2025-07-03 PROCEDURE — 85027 COMPLETE CBC AUTOMATED: CPT

## 2025-07-03 RX ORDER — POTASSIUM CHLORIDE 14.9 MG/ML
20 INJECTION INTRAVENOUS ONCE
Status: CANCELLED
Start: 2025-07-07

## 2025-07-07 ENCOUNTER — HOSPITAL ENCOUNTER (OUTPATIENT)
Dept: INFUSION CENTER | Facility: HOSPITAL | Age: 75
Discharge: HOME | End: 2025-07-07
Attending: INTERNAL MEDICINE
Payer: MEDICARE

## 2025-07-07 VITALS
DIASTOLIC BLOOD PRESSURE: 63 MMHG | SYSTOLIC BLOOD PRESSURE: 148 MMHG | OXYGEN SATURATION: 98 % | TEMPERATURE: 97.4 F | BODY MASS INDEX: 27.41 KG/M2 | WEIGHT: 195.77 LBS | HEART RATE: 76 BPM | RESPIRATION RATE: 18 BRPM | HEIGHT: 71 IN

## 2025-07-07 DIAGNOSIS — D46.Z MDS (MYELODYSPLASTIC SYNDROME), HIGH GRADE (HCC): Primary | ICD-10-CM

## 2025-07-07 DIAGNOSIS — D69.6 THROMBOCYTOPENIA (HCC): ICD-10-CM

## 2025-07-07 LAB
BASOPHILS # BLD AUTO: 0.01 THOUSANDS/ÂΜL (ref 0–0.1)
BASOPHILS NFR BLD AUTO: 0 % (ref 0–1)
EOSINOPHIL # BLD AUTO: 0.01 THOUSAND/ÂΜL (ref 0–0.61)
EOSINOPHIL NFR BLD AUTO: 0 % (ref 0–6)
ERYTHROCYTE [DISTWIDTH] IN BLOOD BY AUTOMATED COUNT: 24.3 % (ref 11.6–15.1)
HCT VFR BLD AUTO: 27.4 % (ref 36.5–49.3)
HGB BLD-MCNC: 8 G/DL (ref 12–17)
IMM GRANULOCYTES # BLD AUTO: 0.02 THOUSAND/UL (ref 0–0.2)
IMM GRANULOCYTES NFR BLD AUTO: 1 % (ref 0–2)
LYMPHOCYTES # BLD AUTO: 0.36 THOUSANDS/ÂΜL (ref 0.6–4.47)
LYMPHOCYTES NFR BLD AUTO: 16 % (ref 14–44)
MCH RBC QN AUTO: 33.5 PG (ref 26.8–34.3)
MCHC RBC AUTO-ENTMCNC: 29.2 G/DL (ref 31.4–37.4)
MCV RBC AUTO: 115 FL (ref 82–98)
MONOCYTES # BLD AUTO: 0.07 THOUSAND/ÂΜL (ref 0.17–1.22)
MONOCYTES NFR BLD AUTO: 3 % (ref 4–12)
NEUTROPHILS # BLD AUTO: 1.77 THOUSANDS/ÂΜL (ref 1.85–7.62)
NEUTS SEG NFR BLD AUTO: 80 % (ref 43–75)
NRBC BLD AUTO-RTO: 0 /100 WBCS
PLATELET # BLD AUTO: 64 THOUSANDS/UL (ref 149–390)
PMV BLD AUTO: 13.3 FL (ref 8.9–12.7)
RBC # BLD AUTO: 2.39 MILLION/UL (ref 3.88–5.62)
WBC # BLD AUTO: 2.24 THOUSAND/UL (ref 4.31–10.16)

## 2025-07-07 PROCEDURE — 96367 TX/PROPH/DG ADDL SEQ IV INF: CPT

## 2025-07-07 PROCEDURE — 96366 THER/PROPH/DIAG IV INF ADDON: CPT

## 2025-07-07 PROCEDURE — 96413 CHEMO IV INFUSION 1 HR: CPT

## 2025-07-07 PROCEDURE — 85025 COMPLETE CBC W/AUTO DIFF WBC: CPT | Performed by: INTERNAL MEDICINE

## 2025-07-07 RX ORDER — POTASSIUM CHLORIDE 14.9 MG/ML
20 INJECTION INTRAVENOUS ONCE
Status: COMPLETED | OUTPATIENT
Start: 2025-07-07 | End: 2025-07-07

## 2025-07-07 RX ORDER — SODIUM CHLORIDE 9 MG/ML
20 INJECTION, SOLUTION INTRAVENOUS ONCE
Status: COMPLETED | OUTPATIENT
Start: 2025-07-07 | End: 2025-07-07

## 2025-07-07 RX ADMIN — DEXAMETHASONE SODIUM PHOSPHATE: 10 INJECTION, SOLUTION INTRAMUSCULAR; INTRAVENOUS at 11:13

## 2025-07-07 RX ADMIN — SODIUM CHLORIDE 20 ML/HR: 9 INJECTION, SOLUTION INTRAVENOUS at 11:13

## 2025-07-07 RX ADMIN — AZACITIDINE 106 MG: 100 INJECTION, POWDER, LYOPHILIZED, FOR SOLUTION INTRAVENOUS; SUBCUTANEOUS at 11:56

## 2025-07-07 RX ADMIN — POTASSIUM CHLORIDE 20 MEQ: 200 INJECTION, SOLUTION INTRAVENOUS at 09:12

## 2025-07-07 NOTE — PROGRESS NOTES
Rikki Reyes  tolerated treatment well with no complications.  Labs sent as per order     Rikki Reyes is aware of future appt on 7/8 at 1130.     AVS printed and given to Rikki Reyes:  No (Declined by Rikki Reyes)

## 2025-07-08 ENCOUNTER — HOSPITAL ENCOUNTER (OUTPATIENT)
Dept: INFUSION CENTER | Facility: HOSPITAL | Age: 75
Discharge: HOME | End: 2025-07-08
Attending: INTERNAL MEDICINE
Payer: MEDICARE

## 2025-07-08 VITALS
BODY MASS INDEX: 27.9 KG/M2 | OXYGEN SATURATION: 99 % | HEART RATE: 66 BPM | DIASTOLIC BLOOD PRESSURE: 66 MMHG | SYSTOLIC BLOOD PRESSURE: 148 MMHG | TEMPERATURE: 98.1 F | HEIGHT: 71 IN | RESPIRATION RATE: 12 BRPM | WEIGHT: 199.3 LBS

## 2025-07-08 DIAGNOSIS — D46.Z MDS (MYELODYSPLASTIC SYNDROME), HIGH GRADE (HCC): Primary | ICD-10-CM

## 2025-07-08 PROCEDURE — 96413 CHEMO IV INFUSION 1 HR: CPT

## 2025-07-08 PROCEDURE — 96367 TX/PROPH/DG ADDL SEQ IV INF: CPT

## 2025-07-08 RX ORDER — SODIUM CHLORIDE 9 MG/ML
20 INJECTION, SOLUTION INTRAVENOUS ONCE
Status: COMPLETED | OUTPATIENT
Start: 2025-07-08 | End: 2025-07-08

## 2025-07-08 RX ADMIN — AZACITIDINE 106 MG: 100 INJECTION, POWDER, LYOPHILIZED, FOR SOLUTION INTRAVENOUS; SUBCUTANEOUS at 12:39

## 2025-07-08 RX ADMIN — SODIUM CHLORIDE 20 ML/HR: 0.9 INJECTION, SOLUTION INTRAVENOUS at 11:42

## 2025-07-08 RX ADMIN — DEXAMETHASONE SODIUM PHOSPHATE: 10 INJECTION, SOLUTION INTRAMUSCULAR; INTRAVENOUS at 11:42

## 2025-07-08 NOTE — PROGRESS NOTES
Rikki Reyes  tolerated vidaza treatment well with no complications.      Rikki Reyes is aware of future appt on 7/9 at 1300.     AVS printed and given to Rikki Reyes:    No (Declined by Rikki Reyes)

## 2025-07-09 ENCOUNTER — HOSPITAL ENCOUNTER (OUTPATIENT)
Dept: INFUSION CENTER | Facility: HOSPITAL | Age: 75
Discharge: HOME | End: 2025-07-09
Attending: INTERNAL MEDICINE
Payer: MEDICARE

## 2025-07-09 VITALS
WEIGHT: 199.3 LBS | OXYGEN SATURATION: 98 % | HEIGHT: 71 IN | SYSTOLIC BLOOD PRESSURE: 146 MMHG | HEART RATE: 61 BPM | RESPIRATION RATE: 18 BRPM | TEMPERATURE: 97 F | BODY MASS INDEX: 27.9 KG/M2 | DIASTOLIC BLOOD PRESSURE: 67 MMHG

## 2025-07-09 DIAGNOSIS — D46.Z MDS (MYELODYSPLASTIC SYNDROME), HIGH GRADE (HCC): Primary | ICD-10-CM

## 2025-07-09 PROCEDURE — 96367 TX/PROPH/DG ADDL SEQ IV INF: CPT

## 2025-07-09 PROCEDURE — 96413 CHEMO IV INFUSION 1 HR: CPT

## 2025-07-09 RX ORDER — SODIUM CHLORIDE 9 MG/ML
20 INJECTION, SOLUTION INTRAVENOUS ONCE
Status: COMPLETED | OUTPATIENT
Start: 2025-07-09 | End: 2025-07-09

## 2025-07-09 RX ADMIN — DEXAMETHASONE SODIUM PHOSPHATE: 10 INJECTION, SOLUTION INTRAMUSCULAR; INTRAVENOUS at 13:21

## 2025-07-09 RX ADMIN — AZACITIDINE 106 MG: 100 INJECTION, POWDER, LYOPHILIZED, FOR SOLUTION INTRAVENOUS; SUBCUTANEOUS at 13:59

## 2025-07-09 RX ADMIN — SODIUM CHLORIDE 20 ML/HR: 0.9 INJECTION, SOLUTION INTRAVENOUS at 13:21

## 2025-07-09 NOTE — PROGRESS NOTES
Recent labs reviewed.  Patient tolerated Vidaza chemotherapy treatment without reaction or issues.      Rikki Reyes is aware of future appt on 7/10/25 at 1200.     AVS -  No (Declined by Rikki Reyes) Patient has Mychart.    Patient ambulated off unit without incident.  All personal belongings taken with patient.

## 2025-07-10 ENCOUNTER — APPOINTMENT (OUTPATIENT)
Dept: LAB | Facility: HOSPITAL | Age: 75
End: 2025-07-10
Attending: INTERNAL MEDICINE
Payer: MEDICARE

## 2025-07-10 ENCOUNTER — HOSPITAL ENCOUNTER (OUTPATIENT)
Dept: INFUSION CENTER | Facility: HOSPITAL | Age: 75
End: 2025-07-10
Attending: INTERNAL MEDICINE
Payer: MEDICARE

## 2025-07-10 VITALS
OXYGEN SATURATION: 99 % | WEIGHT: 199.3 LBS | HEIGHT: 71 IN | SYSTOLIC BLOOD PRESSURE: 168 MMHG | TEMPERATURE: 98.7 F | RESPIRATION RATE: 18 BRPM | BODY MASS INDEX: 27.9 KG/M2 | HEART RATE: 56 BPM | DIASTOLIC BLOOD PRESSURE: 75 MMHG

## 2025-07-10 DIAGNOSIS — D69.6 THROMBOCYTOPENIA (HCC): ICD-10-CM

## 2025-07-10 DIAGNOSIS — D46.Z MDS (MYELODYSPLASTIC SYNDROME), HIGH GRADE (HCC): Primary | ICD-10-CM

## 2025-07-10 DIAGNOSIS — D46.Z MDS (MYELODYSPLASTIC SYNDROME), HIGH GRADE (HCC): ICD-10-CM

## 2025-07-10 LAB
ALBUMIN SERPL BCG-MCNC: 3.8 G/DL (ref 3.5–5)
ALP SERPL-CCNC: 57 U/L (ref 34–104)
ALT SERPL W P-5'-P-CCNC: 5 U/L (ref 7–52)
ANION GAP SERPL CALCULATED.3IONS-SCNC: 7 MMOL/L (ref 4–13)
AST SERPL W P-5'-P-CCNC: 7 U/L (ref 13–39)
BASOPHILS # BLD AUTO: 0 THOUSANDS/ÂΜL (ref 0–0.1)
BASOPHILS NFR BLD AUTO: 0 % (ref 0–1)
BILIRUB SERPL-MCNC: 2.53 MG/DL (ref 0.2–1)
BUN SERPL-MCNC: 14 MG/DL (ref 5–25)
CALCIUM SERPL-MCNC: 9.3 MG/DL (ref 8.4–10.2)
CHLORIDE SERPL-SCNC: 104 MMOL/L (ref 96–108)
CO2 SERPL-SCNC: 29 MMOL/L (ref 21–32)
CREAT SERPL-MCNC: 0.64 MG/DL (ref 0.6–1.3)
EOSINOPHIL # BLD AUTO: 0 THOUSAND/ÂΜL (ref 0–0.61)
EOSINOPHIL NFR BLD AUTO: 0 % (ref 0–6)
ERYTHROCYTE [DISTWIDTH] IN BLOOD BY AUTOMATED COUNT: 24 % (ref 11.6–15.1)
GFR SERPL CREATININE-BSD FRML MDRD: 96 ML/MIN/1.73SQ M
GLUCOSE P FAST SERPL-MCNC: 230 MG/DL (ref 65–99)
HCT VFR BLD AUTO: 30.9 % (ref 36.5–49.3)
HGB BLD-MCNC: 9.2 G/DL (ref 12–17)
IMM GRANULOCYTES # BLD AUTO: 0.03 THOUSAND/UL (ref 0–0.2)
IMM GRANULOCYTES NFR BLD AUTO: 1 % (ref 0–2)
LYMPHOCYTES # BLD AUTO: 0.29 THOUSANDS/ÂΜL (ref 0.6–4.47)
LYMPHOCYTES NFR BLD AUTO: 6 % (ref 14–44)
MCH RBC QN AUTO: 34.1 PG (ref 26.8–34.3)
MCHC RBC AUTO-ENTMCNC: 29.8 G/DL (ref 31.4–37.4)
MCV RBC AUTO: 114 FL (ref 82–98)
MONOCYTES # BLD AUTO: 0.19 THOUSAND/ÂΜL (ref 0.17–1.22)
MONOCYTES NFR BLD AUTO: 4 % (ref 4–12)
NEUTROPHILS # BLD AUTO: 4.54 THOUSANDS/ÂΜL (ref 1.85–7.62)
NEUTS SEG NFR BLD AUTO: 89 % (ref 43–75)
NRBC BLD AUTO-RTO: 0 /100 WBCS
PLATELET # BLD AUTO: 54 THOUSANDS/UL (ref 149–390)
PMV BLD AUTO: 12.5 FL (ref 8.9–12.7)
POTASSIUM SERPL-SCNC: 3.6 MMOL/L (ref 3.5–5.3)
PROT SERPL-MCNC: 5.7 G/DL (ref 6.4–8.4)
RBC # BLD AUTO: 2.7 MILLION/UL (ref 3.88–5.62)
SODIUM SERPL-SCNC: 140 MMOL/L (ref 135–147)
WBC # BLD AUTO: 5.05 THOUSAND/UL (ref 4.31–10.16)

## 2025-07-10 PROCEDURE — 36415 COLL VENOUS BLD VENIPUNCTURE: CPT

## 2025-07-10 PROCEDURE — 96367 TX/PROPH/DG ADDL SEQ IV INF: CPT

## 2025-07-10 PROCEDURE — 80053 COMPREHEN METABOLIC PANEL: CPT

## 2025-07-10 PROCEDURE — 85025 COMPLETE CBC W/AUTO DIFF WBC: CPT

## 2025-07-10 PROCEDURE — 96413 CHEMO IV INFUSION 1 HR: CPT

## 2025-07-10 RX ORDER — SODIUM CHLORIDE 9 MG/ML
20 INJECTION, SOLUTION INTRAVENOUS ONCE
Status: COMPLETED | OUTPATIENT
Start: 2025-07-10 | End: 2025-07-10

## 2025-07-10 RX ADMIN — AZACITIDINE 106 MG: 100 INJECTION, POWDER, LYOPHILIZED, FOR SOLUTION INTRAVENOUS; SUBCUTANEOUS at 12:47

## 2025-07-10 RX ADMIN — SODIUM CHLORIDE 20 ML/HR: 0.9 INJECTION, SOLUTION INTRAVENOUS at 12:12

## 2025-07-10 RX ADMIN — DEXAMETHASONE SODIUM PHOSPHATE: 10 INJECTION, SOLUTION INTRAMUSCULAR; INTRAVENOUS at 12:12

## 2025-07-10 NOTE — PROGRESS NOTES
Recent labs reviewed.  Patient tolerated Vidaza chemotherapy treatment without reaction or issues.    Rikki Reyes is aware of future appt on 7/11/25 at 0930.     AVS-  No (Declined by Rikki Reyes) Patient has Mychart.     Patient ambulated off unit without incident.  All personal belongings taken with patient.

## 2025-07-11 ENCOUNTER — HOSPITAL ENCOUNTER (OUTPATIENT)
Dept: INFUSION CENTER | Facility: HOSPITAL | Age: 75
End: 2025-07-11
Attending: INTERNAL MEDICINE
Payer: MEDICARE

## 2025-07-11 VITALS
OXYGEN SATURATION: 99 % | TEMPERATURE: 96.9 F | WEIGHT: 199.3 LBS | RESPIRATION RATE: 18 BRPM | HEART RATE: 60 BPM | SYSTOLIC BLOOD PRESSURE: 162 MMHG | HEIGHT: 71 IN | BODY MASS INDEX: 27.9 KG/M2 | DIASTOLIC BLOOD PRESSURE: 73 MMHG

## 2025-07-11 DIAGNOSIS — D46.Z MDS (MYELODYSPLASTIC SYNDROME), HIGH GRADE (HCC): Primary | ICD-10-CM

## 2025-07-11 PROCEDURE — 96413 CHEMO IV INFUSION 1 HR: CPT

## 2025-07-11 PROCEDURE — 96367 TX/PROPH/DG ADDL SEQ IV INF: CPT

## 2025-07-11 RX ORDER — SODIUM CHLORIDE 9 MG/ML
20 INJECTION, SOLUTION INTRAVENOUS ONCE
Status: COMPLETED | OUTPATIENT
Start: 2025-07-11 | End: 2025-07-11

## 2025-07-11 RX ADMIN — SODIUM CHLORIDE 20 ML/HR: 0.9 INJECTION, SOLUTION INTRAVENOUS at 09:53

## 2025-07-11 RX ADMIN — AZACITIDINE 106 MG: 100 INJECTION, POWDER, LYOPHILIZED, FOR SOLUTION INTRAVENOUS; SUBCUTANEOUS at 10:33

## 2025-07-11 RX ADMIN — DEXAMETHASONE SODIUM PHOSPHATE: 10 INJECTION, SOLUTION INTRAMUSCULAR; INTRAVENOUS at 09:53

## 2025-07-11 NOTE — PROGRESS NOTES
Rikki Reyes  tolerated chemotherapy infusion well with no complications.    Rikki Reyes is aware of future appt on 7/14 at 10:30AM.    AVS printed and given to Rikki Reyes.

## 2025-07-14 ENCOUNTER — HOSPITAL ENCOUNTER (OUTPATIENT)
Dept: INFUSION CENTER | Facility: HOSPITAL | Age: 75
Discharge: HOME | End: 2025-07-14
Attending: INTERNAL MEDICINE
Payer: MEDICARE

## 2025-07-14 VITALS — BODY MASS INDEX: 27.9 KG/M2 | HEIGHT: 71 IN | WEIGHT: 199.3 LBS

## 2025-07-14 DIAGNOSIS — D46.Z MDS (MYELODYSPLASTIC SYNDROME), HIGH GRADE (HCC): Primary | ICD-10-CM

## 2025-07-14 DIAGNOSIS — D69.6 THROMBOCYTOPENIA (HCC): ICD-10-CM

## 2025-07-14 LAB
ANISOCYTOSIS BLD QL SMEAR: PRESENT
BASO STIPL BLD QL SMEAR: PRESENT
BASOPHILS # BLD MANUAL: 0 THOUSAND/UL (ref 0–0.1)
BASOPHILS NFR MAR MANUAL: 0 % (ref 0–1)
EOSINOPHIL # BLD MANUAL: 0 THOUSAND/UL (ref 0–0.4)
EOSINOPHIL NFR BLD MANUAL: 0 % (ref 0–6)
ERYTHROCYTE [DISTWIDTH] IN BLOOD BY AUTOMATED COUNT: 23.4 % (ref 11.6–15.1)
HCT VFR BLD AUTO: 30.8 % (ref 36.5–49.3)
HGB BLD-MCNC: 9.4 G/DL (ref 12–17)
LYMPHOCYTES # BLD AUTO: 0.86 THOUSAND/UL (ref 0.6–4.47)
LYMPHOCYTES # BLD AUTO: 23 % (ref 14–44)
MACROCYTES BLD QL AUTO: PRESENT
MCH RBC QN AUTO: 34.6 PG (ref 26.8–34.3)
MCHC RBC AUTO-ENTMCNC: 30.5 G/DL (ref 31.4–37.4)
MCV RBC AUTO: 113 FL (ref 82–98)
MONOCYTES # BLD AUTO: 0.04 THOUSAND/UL (ref 0–1.22)
MONOCYTES NFR BLD: 1 % (ref 4–12)
NEUTROPHILS # BLD MANUAL: 2.84 THOUSAND/UL (ref 1.85–7.62)
NEUTS SEG NFR BLD AUTO: 76 % (ref 43–75)
OVALOCYTES BLD QL SMEAR: PRESENT
PLATELET # BLD AUTO: 25 THOUSANDS/UL (ref 149–390)
PLATELET BLD QL SMEAR: ABNORMAL
POIKILOCYTOSIS BLD QL SMEAR: PRESENT
RBC # BLD AUTO: 2.72 MILLION/UL (ref 3.88–5.62)
RBC MORPH BLD: PRESENT
WBC # BLD AUTO: 3.74 THOUSAND/UL (ref 4.31–10.16)

## 2025-07-14 PROCEDURE — 85027 COMPLETE CBC AUTOMATED: CPT | Performed by: INTERNAL MEDICINE

## 2025-07-14 PROCEDURE — 85007 BL SMEAR W/DIFF WBC COUNT: CPT | Performed by: INTERNAL MEDICINE

## 2025-07-14 PROCEDURE — 96413 CHEMO IV INFUSION 1 HR: CPT

## 2025-07-14 PROCEDURE — 96367 TX/PROPH/DG ADDL SEQ IV INF: CPT

## 2025-07-14 RX ORDER — SODIUM CHLORIDE 9 MG/ML
20 INJECTION, SOLUTION INTRAVENOUS ONCE
Status: COMPLETED | OUTPATIENT
Start: 2025-07-14 | End: 2025-07-14

## 2025-07-14 RX ADMIN — SODIUM CHLORIDE 20 ML/HR: 0.9 INJECTION, SOLUTION INTRAVENOUS at 11:12

## 2025-07-14 RX ADMIN — DEXAMETHASONE SODIUM PHOSPHATE: 10 INJECTION, SOLUTION INTRAMUSCULAR; INTRAVENOUS at 11:07

## 2025-07-14 RX ADMIN — AZACITIDINE 106 MG: 100 INJECTION, POWDER, LYOPHILIZED, FOR SOLUTION INTRAVENOUS; SUBCUTANEOUS at 11:55

## 2025-07-15 ENCOUNTER — HOSPITAL ENCOUNTER (OUTPATIENT)
Dept: INFUSION CENTER | Facility: HOSPITAL | Age: 75
Discharge: HOME | End: 2025-07-15
Attending: INTERNAL MEDICINE
Payer: MEDICARE

## 2025-07-15 VITALS
DIASTOLIC BLOOD PRESSURE: 71 MMHG | WEIGHT: 199.3 LBS | HEART RATE: 60 BPM | SYSTOLIC BLOOD PRESSURE: 129 MMHG | OXYGEN SATURATION: 98 % | RESPIRATION RATE: 16 BRPM | HEIGHT: 71 IN | TEMPERATURE: 97.4 F | BODY MASS INDEX: 27.9 KG/M2

## 2025-07-15 DIAGNOSIS — D46.Z MDS (MYELODYSPLASTIC SYNDROME), HIGH GRADE (HCC): Primary | ICD-10-CM

## 2025-07-15 PROCEDURE — 96413 CHEMO IV INFUSION 1 HR: CPT

## 2025-07-15 PROCEDURE — 96367 TX/PROPH/DG ADDL SEQ IV INF: CPT

## 2025-07-15 RX ORDER — SODIUM CHLORIDE 9 MG/ML
20 INJECTION, SOLUTION INTRAVENOUS ONCE
Status: COMPLETED | OUTPATIENT
Start: 2025-07-15 | End: 2025-07-15

## 2025-07-15 RX ADMIN — DEXAMETHASONE SODIUM PHOSPHATE: 10 INJECTION, SOLUTION INTRAMUSCULAR; INTRAVENOUS at 13:40

## 2025-07-15 RX ADMIN — SODIUM CHLORIDE 20 ML/HR: 0.9 INJECTION, SOLUTION INTRAVENOUS at 13:31

## 2025-07-15 RX ADMIN — AZACITIDINE 106 MG: 100 INJECTION, POWDER, LYOPHILIZED, FOR SOLUTION INTRAVENOUS; SUBCUTANEOUS at 14:24

## 2025-07-17 ENCOUNTER — CONSULT (OUTPATIENT)
Dept: CARDIOLOGY CLINIC | Facility: CLINIC | Age: 75
End: 2025-07-17
Attending: INTERNAL MEDICINE
Payer: MEDICARE

## 2025-07-17 ENCOUNTER — TELEPHONE (OUTPATIENT)
Age: 75
End: 2025-07-17

## 2025-07-17 ENCOUNTER — APPOINTMENT (OUTPATIENT)
Dept: LAB | Facility: HOSPITAL | Age: 75
End: 2025-07-17
Attending: INTERNAL MEDICINE
Payer: MEDICARE

## 2025-07-17 ENCOUNTER — HOSPITAL ENCOUNTER (OUTPATIENT)
Dept: INFUSION CENTER | Facility: HOSPITAL | Age: 75
Discharge: HOME | End: 2025-07-17
Payer: MEDICARE

## 2025-07-17 VITALS
WEIGHT: 185 LBS | HEART RATE: 62 BPM | BODY MASS INDEX: 25.9 KG/M2 | HEIGHT: 71 IN | SYSTOLIC BLOOD PRESSURE: 110 MMHG | DIASTOLIC BLOOD PRESSURE: 70 MMHG

## 2025-07-17 DIAGNOSIS — E78.2 MIXED HYPERLIPIDEMIA: ICD-10-CM

## 2025-07-17 DIAGNOSIS — D64.9 ACUTE ANEMIA: Primary | ICD-10-CM

## 2025-07-17 DIAGNOSIS — R93.1 ELEVATED CORONARY ARTERY CALCIUM SCORE: ICD-10-CM

## 2025-07-17 DIAGNOSIS — D46.Z MDS (MYELODYSPLASTIC SYNDROME), HIGH GRADE (HCC): ICD-10-CM

## 2025-07-17 DIAGNOSIS — D69.6 THROMBOCYTOPENIA (HCC): ICD-10-CM

## 2025-07-17 DIAGNOSIS — R01.1 MURMUR, CARDIAC: Primary | ICD-10-CM

## 2025-07-17 LAB
ANISOCYTOSIS BLD QL SMEAR: PRESENT
BASO STIPL BLD QL SMEAR: PRESENT
BASOPHILS # BLD MANUAL: 0 THOUSAND/UL (ref 0–0.1)
BASOPHILS NFR MAR MANUAL: 0 % (ref 0–1)
EOSINOPHIL # BLD MANUAL: 0 THOUSAND/UL (ref 0–0.4)
EOSINOPHIL NFR BLD MANUAL: 0 % (ref 0–6)
ERYTHROCYTE [DISTWIDTH] IN BLOOD BY AUTOMATED COUNT: 23.5 % (ref 11.6–15.1)
HCT VFR BLD AUTO: 31.9 % (ref 36.5–49.3)
HGB BLD-MCNC: 9.9 G/DL (ref 12–17)
LYMPHOCYTES # BLD AUTO: 1.24 THOUSAND/UL (ref 0.6–4.47)
LYMPHOCYTES # BLD AUTO: 29 % (ref 14–44)
MACROCYTES BLD QL AUTO: PRESENT
MCH RBC QN AUTO: 34.7 PG (ref 26.8–34.3)
MCHC RBC AUTO-ENTMCNC: 31 G/DL (ref 31.4–37.4)
MCV RBC AUTO: 112 FL (ref 82–98)
MONOCYTES # BLD AUTO: 0.13 THOUSAND/UL (ref 0–1.22)
MONOCYTES NFR BLD: 3 % (ref 4–12)
NEUTROPHILS # BLD MANUAL: 2.9 THOUSAND/UL (ref 1.85–7.62)
NEUTS SEG NFR BLD AUTO: 68 % (ref 43–75)
NRBC BLD AUTO-RTO: 1 /100 WBC (ref 0–2)
OVALOCYTES BLD QL SMEAR: PRESENT
PLATELET # BLD AUTO: 18 THOUSANDS/UL (ref 149–390)
PLATELET BLD QL SMEAR: ABNORMAL
PMV BLD AUTO: 12.6 FL (ref 8.9–12.7)
POIKILOCYTOSIS BLD QL SMEAR: PRESENT
RBC # BLD AUTO: 2.85 MILLION/UL (ref 3.88–5.62)
RBC MORPH BLD: PRESENT
WBC # BLD AUTO: 4.26 THOUSAND/UL (ref 4.31–10.16)

## 2025-07-17 PROCEDURE — 99204 OFFICE O/P NEW MOD 45 MIN: CPT | Performed by: INTERNAL MEDICINE

## 2025-07-17 PROCEDURE — 85007 BL SMEAR W/DIFF WBC COUNT: CPT

## 2025-07-17 PROCEDURE — 85027 COMPLETE CBC AUTOMATED: CPT

## 2025-07-17 PROCEDURE — 36415 COLL VENOUS BLD VENIPUNCTURE: CPT

## 2025-07-17 PROCEDURE — 93000 ELECTROCARDIOGRAM COMPLETE: CPT | Performed by: INTERNAL MEDICINE

## 2025-07-17 RX ORDER — SODIUM CHLORIDE 9 MG/ML
20 INJECTION, SOLUTION INTRAVENOUS ONCE
Status: CANCELLED | OUTPATIENT
Start: 2025-07-18

## 2025-07-17 NOTE — TELEPHONE ENCOUNTER
Hugo diaz from [facility] to give Dr. Woods critical lab results. Platelet count 18

## 2025-07-17 NOTE — ASSESSMENT & PLAN NOTE
Not audible today.  Perhaps when he is more anemic there is a murmur.  In any event no evidence for structural heart disease on exam and I do not believe there is any reason to pursue further workup.

## 2025-07-17 NOTE — PROGRESS NOTES
" Patient ID: Rikki Reyes is a 75 y.o. male.        Plan:      Assessment & Plan  Murmur, cardiac  Not audible today.  Perhaps when he is more anemic there is a murmur.  In any event no evidence for structural heart disease on exam and I do not believe there is any reason to pursue further workup.  Elevated coronary artery calcium score  Statin tx is reasonable.  Mixed hyperlipidemia  See above.      Follow up Plan/Other summary comments:  I would be happy to see the patient again in the future should the need arise based on change in symptomatology.    HPI: Patient is seen today in consultation from Dr. Woods regarding abnormal heart exam.  Patient has been treated for MDS with significant pancytopenia.  He has had multiple transfusions.  Heart murmur was heard and this led to today's visit.  It is notable that the patient has not had any chest pain or chest pressure.  Ambulation has been worsened based on diminished appetite and losing weight.  He was previously followed by a cardiologist in [place] because intermittent abnormal calcium score in 2018.  A stress echo in 2021 was normal.      Results for orders placed or performed in visit on 07/17/25   POCT ECG    Impression    NSR. WNL.         Most recent or relevant cardiac/vascular testing:    Stress echo 1/29/2021: LVEF hyperdynamic.  No valve abnormality was described.  No ischemia.      Past Surgical History[1]    Lipid Profile: Reviewed      Review of Systems   10  point ROS  was otherwise non pertinent or negative except as per HPI or as below.   Gait:  Normal.  Lots of skin bruising.        Objective:     /70   Pulse 62   Ht 5' 10.98\" (1.803 m)   Wt 83.9 kg (185 lb)   BMI 25.82 kg/m²     PHYSICAL EXAM:    General:  Normal appearance in no distress.  Eyes:  Anicteric.  Oral mucosa:  Moist.  Neck:  No JVD. Carotid upstrokes are brisk without bruits.  No masses.  Chest:  Clear to auscultation.  Cardiac:  No palpable PMI.  Normal S1 and S2.  No " murmur gallop or rub.  Abdomen:  Soft and nontender. No palpable organomegaly or aortic enlargement.  Extremities:  No peripheral edema.  Musculoskeletal:  Symmetric.   Vascular:  Femoral pulses are brisk without bruits.  Popliteal pulses are intact bilaterally.   Pedal pulses are intact.  Neuro:  Grossly symmetric.  Psych:  Alert and oriented x3.      Meds reviewed.    Past Medical History[2]        Tobacco Use History[3]               [1]   Past Surgical History:  Procedure Laterality Date    DENTAL IMPLANT      HEMANGIOMA EXCISION      throat    HEMORROIDECTOMY      IR BIOPSY BONE MARROW  2025    IR PORT PLACEMENT  2025   [2]   Past Medical History:  Diagnosis Date    Anemia     Coronary artery disease     via CT imaging in 2018    Diabetes mellitus (HCC)     A1C normal.  Metformin discontinued    Hyperlipidemia     Hypertension     MDS (myelodysplastic syndrome), high grade (HCC)    [3]   Social History  Tobacco Use   Smoking Status Former    Current packs/day: 0.00    Average packs/day: 1.5 packs/day for 20.0 years (30.0 ttl pk-yrs)    Types: Cigarettes    Start date: 1970    Quit date: 1990    Years since quittin.5   Smokeless Tobacco Never

## 2025-07-17 NOTE — TELEPHONE ENCOUNTER
Phoned [facility] infusion.  Infusion to confirm platelet availability.  Infusion will reach out to patient with infusion appt.     Appt scheduled for tomorrow 7/18 for platelet transfusion.  Unable to transfuse today.  Patient with conflicting appts today.

## 2025-07-17 NOTE — PROGRESS NOTES
Patient requires 1 unit of platelets. Prepare order released and received by blood bank. Patient and lab aware of appointment 7/18 @ 12:30PM.

## 2025-07-18 ENCOUNTER — HOSPITAL ENCOUNTER (OUTPATIENT)
Dept: INFUSION CENTER | Facility: HOSPITAL | Age: 75
End: 2025-07-18
Attending: INTERNAL MEDICINE
Payer: MEDICARE

## 2025-07-18 VITALS
OXYGEN SATURATION: 100 % | SYSTOLIC BLOOD PRESSURE: 118 MMHG | DIASTOLIC BLOOD PRESSURE: 58 MMHG | RESPIRATION RATE: 18 BRPM | HEART RATE: 64 BPM | TEMPERATURE: 97.2 F

## 2025-07-18 DIAGNOSIS — D64.9 ACUTE ANEMIA: Primary | ICD-10-CM

## 2025-07-18 PROCEDURE — P9037 PLATE PHERES LEUKOREDU IRRAD: HCPCS

## 2025-07-18 PROCEDURE — 36430 TRANSFUSION BLD/BLD COMPNT: CPT

## 2025-07-18 RX ORDER — SODIUM CHLORIDE 9 MG/ML
20 INJECTION, SOLUTION INTRAVENOUS ONCE
Status: CANCELLED | OUTPATIENT
Start: 2025-07-25

## 2025-07-18 RX ORDER — SODIUM CHLORIDE 9 MG/ML
20 INJECTION, SOLUTION INTRAVENOUS ONCE
Status: COMPLETED | OUTPATIENT
Start: 2025-07-18 | End: 2025-07-18

## 2025-07-18 RX ADMIN — SODIUM CHLORIDE 20 ML/HR: 9 INJECTION, SOLUTION INTRAVENOUS at 12:35

## 2025-07-18 NOTE — PROGRESS NOTES
Rikki Reyes  tolerated treatment well with no complications.      Rikki Reyes is aware of future appt on 8/4 at 1230.     AVS printed and given to Rikki Reyes:  Calendar print out

## 2025-07-19 LAB
ABO GROUP BLD BPU: NORMAL
BPU ID: NORMAL
UNIT DISPENSE STATUS: NORMAL
UNIT PRODUCT CODE: NORMAL
UNIT PRODUCT VOLUME: 300 ML
UNIT RH: NORMAL

## 2025-07-21 ENCOUNTER — APPOINTMENT (OUTPATIENT)
Dept: LAB | Facility: HOSPITAL | Age: 75
End: 2025-07-21
Payer: MEDICARE

## 2025-07-21 DIAGNOSIS — D69.6 THROMBOCYTOPENIA (HCC): ICD-10-CM

## 2025-07-21 DIAGNOSIS — D46.Z MDS (MYELODYSPLASTIC SYNDROME), HIGH GRADE (HCC): ICD-10-CM

## 2025-07-21 LAB
ANISOCYTOSIS BLD QL SMEAR: PRESENT
BASO STIPL BLD QL SMEAR: PRESENT
BASOPHILS # BLD AUTO: 0 THOUSANDS/ÂΜL (ref 0–0.1)
BASOPHILS NFR BLD AUTO: 0 % (ref 0–1)
EOSINOPHIL # BLD AUTO: 0 THOUSAND/ÂΜL (ref 0–0.61)
EOSINOPHIL NFR BLD AUTO: 0 % (ref 0–6)
ERYTHROCYTE [DISTWIDTH] IN BLOOD BY AUTOMATED COUNT: 23.8 % (ref 11.6–15.1)
HCT VFR BLD AUTO: 34.1 % (ref 36.5–49.3)
HGB BLD-MCNC: 10.4 G/DL (ref 12–17)
IMM GRANULOCYTES # BLD AUTO: 0 THOUSAND/UL (ref 0–0.2)
IMM GRANULOCYTES NFR BLD AUTO: 0 % (ref 0–2)
LYMPHOCYTES # BLD AUTO: 0.45 THOUSANDS/ÂΜL (ref 0.6–4.47)
LYMPHOCYTES NFR BLD AUTO: 40 % (ref 14–44)
MACROCYTES BLD QL AUTO: PRESENT
MCH RBC QN AUTO: 35.3 PG (ref 26.8–34.3)
MCHC RBC AUTO-ENTMCNC: 30.5 G/DL (ref 31.4–37.4)
MCV RBC AUTO: 116 FL (ref 82–98)
MONOCYTES # BLD AUTO: 0.03 THOUSAND/ÂΜL (ref 0.17–1.22)
MONOCYTES NFR BLD AUTO: 3 % (ref 4–12)
NEUTROPHILS # BLD AUTO: 0.64 THOUSANDS/ÂΜL (ref 1.85–7.62)
NEUTS SEG NFR BLD AUTO: 57 % (ref 43–75)
NRBC BLD AUTO-RTO: 0 /100 WBCS
PLATELET # BLD AUTO: 23 THOUSANDS/UL (ref 149–390)
PLATELET BLD QL SMEAR: ABNORMAL
RBC # BLD AUTO: 2.95 MILLION/UL (ref 3.88–5.62)
RBC MORPH BLD: PRESENT
WBC # BLD AUTO: 1.12 THOUSAND/UL (ref 4.31–10.16)

## 2025-07-21 PROCEDURE — 36415 COLL VENOUS BLD VENIPUNCTURE: CPT

## 2025-07-21 PROCEDURE — 85025 COMPLETE CBC W/AUTO DIFF WBC: CPT

## 2025-07-24 ENCOUNTER — APPOINTMENT (OUTPATIENT)
Dept: LAB | Facility: HOSPITAL | Age: 75
End: 2025-07-24
Payer: MEDICARE

## 2025-07-24 DIAGNOSIS — D69.6 THROMBOCYTOPENIA (HCC): ICD-10-CM

## 2025-07-24 DIAGNOSIS — D46.Z MDS (MYELODYSPLASTIC SYNDROME), HIGH GRADE (HCC): ICD-10-CM

## 2025-07-24 LAB
ALBUMIN SERPL BCG-MCNC: 3.7 G/DL (ref 3.5–5)
ALP SERPL-CCNC: 45 U/L (ref 34–104)
ALT SERPL W P-5'-P-CCNC: 7 U/L (ref 7–52)
ANION GAP SERPL CALCULATED.3IONS-SCNC: 7 MMOL/L (ref 4–13)
ANISOCYTOSIS BLD QL SMEAR: PRESENT
AST SERPL W P-5'-P-CCNC: 7 U/L (ref 13–39)
BASOPHILS # BLD MANUAL: 0 THOUSAND/UL (ref 0–0.1)
BASOPHILS NFR MAR MANUAL: 0 % (ref 0–1)
BILIRUB SERPL-MCNC: 2.38 MG/DL (ref 0.2–1)
BUN SERPL-MCNC: 11 MG/DL (ref 5–25)
CALCIUM SERPL-MCNC: 9.3 MG/DL (ref 8.4–10.2)
CHLORIDE SERPL-SCNC: 103 MMOL/L (ref 96–108)
CO2 SERPL-SCNC: 27 MMOL/L (ref 21–32)
CREAT SERPL-MCNC: 0.63 MG/DL (ref 0.6–1.3)
DACRYOCYTES BLD QL SMEAR: PRESENT
EOSINOPHIL # BLD MANUAL: 0.01 THOUSAND/UL (ref 0–0.4)
EOSINOPHIL NFR BLD MANUAL: 2 % (ref 0–6)
ERYTHROCYTE [DISTWIDTH] IN BLOOD BY AUTOMATED COUNT: 22.5 % (ref 11.6–15.1)
GFR SERPL CREATININE-BSD FRML MDRD: 97 ML/MIN/1.73SQ M
GLUCOSE SERPL-MCNC: 266 MG/DL (ref 65–140)
HCT VFR BLD AUTO: 29.5 % (ref 36.5–49.3)
HGB BLD-MCNC: 9 G/DL (ref 12–17)
HYPERCHROMIA BLD QL SMEAR: PRESENT
LYMPHOCYTES # BLD AUTO: 0.41 THOUSAND/UL (ref 0.6–4.47)
LYMPHOCYTES # BLD AUTO: 60 % (ref 14–44)
MACROCYTES BLD QL AUTO: PRESENT
MCH RBC QN AUTO: 35.4 PG (ref 26.8–34.3)
MCHC RBC AUTO-ENTMCNC: 30.5 G/DL (ref 31.4–37.4)
MCV RBC AUTO: 116 FL (ref 82–98)
MONOCYTES # BLD AUTO: 0 THOUSAND/UL (ref 0–1.22)
MONOCYTES NFR BLD: 0 % (ref 4–12)
NEUTROPHILS # BLD MANUAL: 0.26 THOUSAND/UL (ref 1.85–7.62)
NEUTS BAND NFR BLD MANUAL: 1 % (ref 0–8)
NEUTS SEG NFR BLD AUTO: 37 % (ref 43–75)
PLATELET # BLD AUTO: 21 THOUSANDS/UL (ref 149–390)
PLATELET BLD QL SMEAR: ABNORMAL
PMV BLD AUTO: 12.6 FL (ref 8.9–12.7)
POLYCHROMASIA BLD QL SMEAR: PRESENT
POTASSIUM SERPL-SCNC: 3.9 MMOL/L (ref 3.5–5.3)
PROT SERPL-MCNC: 6 G/DL (ref 6.4–8.4)
RBC # BLD AUTO: 2.54 MILLION/UL (ref 3.88–5.62)
RBC MORPH BLD: PRESENT
SCHISTOCYTES BLD QL SMEAR: PRESENT
SODIUM SERPL-SCNC: 137 MMOL/L (ref 135–147)
WBC # BLD AUTO: 0.68 THOUSAND/UL (ref 4.31–10.16)

## 2025-07-24 PROCEDURE — 80053 COMPREHEN METABOLIC PANEL: CPT

## 2025-07-24 PROCEDURE — 85007 BL SMEAR W/DIFF WBC COUNT: CPT

## 2025-07-24 PROCEDURE — 85027 COMPLETE CBC AUTOMATED: CPT

## 2025-07-24 PROCEDURE — 36415 COLL VENOUS BLD VENIPUNCTURE: CPT

## 2025-07-24 RX ORDER — SODIUM CHLORIDE 9 MG/ML
20 INJECTION, SOLUTION INTRAVENOUS ONCE
OUTPATIENT
Start: 2025-07-25

## 2025-07-25 ENCOUNTER — HOSPITAL ENCOUNTER (OUTPATIENT)
Dept: INFUSION CENTER | Facility: HOSPITAL | Age: 75
Discharge: HOME | End: 2025-07-25
Payer: MEDICARE

## 2025-07-25 VITALS
DIASTOLIC BLOOD PRESSURE: 58 MMHG | TEMPERATURE: 98.5 F | HEART RATE: 80 BPM | RESPIRATION RATE: 18 BRPM | OXYGEN SATURATION: 98 % | SYSTOLIC BLOOD PRESSURE: 118 MMHG

## 2025-07-25 DIAGNOSIS — D64.9 ACUTE ANEMIA: Primary | ICD-10-CM

## 2025-07-25 PROCEDURE — P9037 PLATE PHERES LEUKOREDU IRRAD: HCPCS

## 2025-07-25 PROCEDURE — 36430 TRANSFUSION BLD/BLD COMPNT: CPT

## 2025-07-25 RX ORDER — SODIUM CHLORIDE 9 MG/ML
20 INJECTION, SOLUTION INTRAVENOUS ONCE
Status: DISCONTINUED | OUTPATIENT
Start: 2025-07-25 | End: 2025-07-29 | Stop reason: HOSPADM

## 2025-07-28 ENCOUNTER — APPOINTMENT (OUTPATIENT)
Dept: LAB | Facility: HOSPITAL | Age: 75
End: 2025-07-28
Payer: MEDICARE

## 2025-07-28 DIAGNOSIS — D46.Z MDS (MYELODYSPLASTIC SYNDROME), HIGH GRADE (HCC): ICD-10-CM

## 2025-07-28 DIAGNOSIS — D69.6 THROMBOCYTOPENIA (HCC): ICD-10-CM

## 2025-07-28 LAB
ANISOCYTOSIS BLD QL SMEAR: PRESENT
BASO STIPL BLD QL SMEAR: PRESENT
BASOPHILS # BLD MANUAL: 0 THOUSAND/UL (ref 0–0.1)
BASOPHILS NFR MAR MANUAL: 0 % (ref 0–1)
EOSINOPHIL # BLD MANUAL: 0 THOUSAND/UL (ref 0–0.4)
EOSINOPHIL NFR BLD MANUAL: 0 % (ref 0–6)
ERYTHROCYTE [DISTWIDTH] IN BLOOD BY AUTOMATED COUNT: 21.1 % (ref 11.6–15.1)
HCT VFR BLD AUTO: 31.7 % (ref 36.5–49.3)
HGB BLD-MCNC: 9.7 G/DL (ref 12–17)
LG PLATELETS BLD QL SMEAR: PRESENT
LYMPHOCYTES # BLD AUTO: 0.52 THOUSAND/UL (ref 0.6–4.47)
LYMPHOCYTES # BLD AUTO: 56 % (ref 14–44)
MACROCYTES BLD QL AUTO: PRESENT
MCH RBC QN AUTO: 35.4 PG (ref 26.8–34.3)
MCHC RBC AUTO-ENTMCNC: 30.6 G/DL (ref 31.4–37.4)
MCV RBC AUTO: 116 FL (ref 82–98)
MONOCYTES # BLD AUTO: 0 THOUSAND/UL (ref 0–1.22)
MONOCYTES NFR BLD: 0 % (ref 4–12)
NEUTROPHILS # BLD MANUAL: 0.4 THOUSAND/UL (ref 1.85–7.62)
NEUTS SEG NFR BLD AUTO: 44 % (ref 43–75)
PLATELET # BLD AUTO: 78 THOUSANDS/UL (ref 149–390)
PLATELET BLD QL SMEAR: ABNORMAL
PMV BLD AUTO: 11.7 FL (ref 8.9–12.7)
POIKILOCYTOSIS BLD QL SMEAR: PRESENT
POLYCHROMASIA BLD QL SMEAR: PRESENT
RBC # BLD AUTO: 2.74 MILLION/UL (ref 3.88–5.62)
RBC MORPH BLD: PRESENT
WBC # BLD AUTO: 0.92 THOUSAND/UL (ref 4.31–10.16)

## 2025-07-28 PROCEDURE — 36415 COLL VENOUS BLD VENIPUNCTURE: CPT

## 2025-07-28 PROCEDURE — 85007 BL SMEAR W/DIFF WBC COUNT: CPT

## 2025-07-28 PROCEDURE — 85027 COMPLETE CBC AUTOMATED: CPT

## 2025-07-28 RX ORDER — SODIUM CHLORIDE 9 MG/ML
20 INJECTION, SOLUTION INTRAVENOUS ONCE
Status: CANCELLED | OUTPATIENT
Start: 2025-08-08

## 2025-07-28 RX ORDER — SODIUM CHLORIDE 9 MG/ML
20 INJECTION, SOLUTION INTRAVENOUS ONCE
Status: CANCELLED | OUTPATIENT
Start: 2025-08-04

## 2025-07-28 RX ORDER — SODIUM CHLORIDE 9 MG/ML
20 INJECTION, SOLUTION INTRAVENOUS ONCE
OUTPATIENT
Start: 2025-08-11

## 2025-07-28 RX ORDER — SODIUM CHLORIDE 9 MG/ML
20 INJECTION, SOLUTION INTRAVENOUS ONCE
Status: CANCELLED | OUTPATIENT
Start: 2025-08-05

## 2025-07-28 RX ORDER — SODIUM CHLORIDE 9 MG/ML
20 INJECTION, SOLUTION INTRAVENOUS ONCE
Status: CANCELLED | OUTPATIENT
Start: 2025-08-07

## 2025-07-28 RX ORDER — SODIUM CHLORIDE 9 MG/ML
20 INJECTION, SOLUTION INTRAVENOUS ONCE
OUTPATIENT
Start: 2025-08-12

## 2025-07-28 RX ORDER — SODIUM CHLORIDE 9 MG/ML
20 INJECTION, SOLUTION INTRAVENOUS ONCE
Status: CANCELLED | OUTPATIENT
Start: 2025-08-06

## 2025-07-31 ENCOUNTER — APPOINTMENT (OUTPATIENT)
Dept: LAB | Facility: HOSPITAL | Age: 75
End: 2025-07-31
Payer: MEDICARE

## 2025-07-31 DIAGNOSIS — D69.6 THROMBOCYTOPENIA (HCC): ICD-10-CM

## 2025-07-31 DIAGNOSIS — D46.Z MDS (MYELODYSPLASTIC SYNDROME), HIGH GRADE (HCC): ICD-10-CM

## 2025-07-31 LAB
ALBUMIN SERPL BCG-MCNC: 3.9 G/DL (ref 3.5–5)
ALP SERPL-CCNC: 50 U/L (ref 34–104)
ALT SERPL W P-5'-P-CCNC: 6 U/L (ref 7–52)
ANION GAP SERPL CALCULATED.3IONS-SCNC: 6 MMOL/L (ref 4–13)
ANISOCYTOSIS BLD QL SMEAR: PRESENT
AST SERPL W P-5'-P-CCNC: 9 U/L (ref 13–39)
BASO STIPL BLD QL SMEAR: PRESENT
BASOPHILS # BLD MANUAL: 0 THOUSAND/UL (ref 0–0.1)
BASOPHILS NFR MAR MANUAL: 0 % (ref 0–1)
BILIRUB SERPL-MCNC: 2.39 MG/DL (ref 0.2–1)
BUN SERPL-MCNC: 11 MG/DL (ref 5–25)
CALCIUM SERPL-MCNC: 9.3 MG/DL (ref 8.4–10.2)
CHLORIDE SERPL-SCNC: 108 MMOL/L (ref 96–108)
CO2 SERPL-SCNC: 29 MMOL/L (ref 21–32)
CREAT SERPL-MCNC: 0.68 MG/DL (ref 0.6–1.3)
EOSINOPHIL # BLD MANUAL: 0 THOUSAND/UL (ref 0–0.4)
EOSINOPHIL NFR BLD MANUAL: 0 % (ref 0–6)
ERYTHROCYTE [DISTWIDTH] IN BLOOD BY AUTOMATED COUNT: 19.9 % (ref 11.6–15.1)
GFR SERPL CREATININE-BSD FRML MDRD: 94 ML/MIN/1.73SQ M
GLUCOSE P FAST SERPL-MCNC: 146 MG/DL (ref 65–99)
HCT VFR BLD AUTO: 33.8 % (ref 36.5–49.3)
HGB BLD-MCNC: 10.2 G/DL (ref 12–17)
LYMPHOCYTES # BLD AUTO: 0.54 THOUSAND/UL (ref 0.6–4.47)
LYMPHOCYTES # BLD AUTO: 44 % (ref 14–44)
MCH RBC QN AUTO: 34 PG (ref 26.8–34.3)
MCHC RBC AUTO-ENTMCNC: 30.2 G/DL (ref 31.4–37.4)
MCV RBC AUTO: 113 FL (ref 82–98)
MICROCYTES BLD QL AUTO: PRESENT
MONOCYTES # BLD AUTO: 0 THOUSAND/UL (ref 0–1.22)
MONOCYTES NFR BLD: 0 % (ref 4–12)
NEUTROPHILS # BLD MANUAL: 0.69 THOUSAND/UL (ref 1.85–7.62)
NEUTS SEG NFR BLD AUTO: 56 % (ref 43–75)
NRBC BLD AUTO-RTO: 2 /100 WBC (ref 0–2)
PLATELET # BLD AUTO: 83 THOUSANDS/UL (ref 149–390)
PLATELET BLD QL SMEAR: ABNORMAL
PMV BLD AUTO: 12 FL (ref 8.9–12.7)
POIKILOCYTOSIS BLD QL SMEAR: PRESENT
POLYCHROMASIA BLD QL SMEAR: PRESENT
POTASSIUM SERPL-SCNC: 3.6 MMOL/L (ref 3.5–5.3)
PROT SERPL-MCNC: 6 G/DL (ref 6.4–8.4)
RBC # BLD AUTO: 3 MILLION/UL (ref 3.88–5.62)
RBC MORPH BLD: PRESENT
SODIUM SERPL-SCNC: 143 MMOL/L (ref 135–147)
WBC # BLD AUTO: 1.23 THOUSAND/UL (ref 4.31–10.16)

## 2025-07-31 PROCEDURE — 85007 BL SMEAR W/DIFF WBC COUNT: CPT

## 2025-07-31 PROCEDURE — 80053 COMPREHEN METABOLIC PANEL: CPT

## 2025-07-31 PROCEDURE — 36415 COLL VENOUS BLD VENIPUNCTURE: CPT

## 2025-07-31 PROCEDURE — 85027 COMPLETE CBC AUTOMATED: CPT

## 2025-08-04 ENCOUNTER — HOSPITAL ENCOUNTER (OUTPATIENT)
Dept: INFUSION CENTER | Facility: HOSPITAL | Age: 75
Discharge: HOME | End: 2025-08-04
Attending: INTERNAL MEDICINE
Payer: MEDICARE

## 2025-08-05 ENCOUNTER — HOSPITAL ENCOUNTER (OUTPATIENT)
Dept: INFUSION CENTER | Facility: HOSPITAL | Age: 75
Discharge: HOME | End: 2025-08-05
Attending: INTERNAL MEDICINE
Payer: MEDICARE

## 2025-08-06 ENCOUNTER — HOSPITAL ENCOUNTER (OUTPATIENT)
Dept: INFUSION CENTER | Facility: HOSPITAL | Age: 75
Discharge: HOME | End: 2025-08-06
Attending: INTERNAL MEDICINE
Payer: MEDICARE

## 2025-08-07 ENCOUNTER — APPOINTMENT (OUTPATIENT)
Dept: LAB | Facility: HOSPITAL | Age: 75
End: 2025-08-07
Payer: MEDICARE

## 2025-08-07 ENCOUNTER — HOSPITAL ENCOUNTER (OUTPATIENT)
Dept: INFUSION CENTER | Facility: HOSPITAL | Age: 75
End: 2025-08-07
Attending: INTERNAL MEDICINE
Payer: MEDICARE

## 2025-08-08 ENCOUNTER — HOSPITAL ENCOUNTER (OUTPATIENT)
Dept: INFUSION CENTER | Facility: HOSPITAL | Age: 75
End: 2025-08-08
Attending: INTERNAL MEDICINE
Payer: MEDICARE

## 2025-08-11 ENCOUNTER — HOSPITAL ENCOUNTER (OUTPATIENT)
Dept: INFUSION CENTER | Facility: HOSPITAL | Age: 75
Discharge: HOME | End: 2025-08-11
Attending: INTERNAL MEDICINE
Payer: MEDICARE

## 2025-08-12 ENCOUNTER — HOSPITAL ENCOUNTER (OUTPATIENT)
Dept: INFUSION CENTER | Facility: HOSPITAL | Age: 75
Discharge: HOME | End: 2025-08-12
Attending: INTERNAL MEDICINE
Payer: MEDICARE

## 2025-08-13 ENCOUNTER — OFFICE VISIT (OUTPATIENT)
Dept: HEMATOLOGY ONCOLOGY | Facility: CLINIC | Age: 75
End: 2025-08-13
Payer: MEDICARE

## 2025-08-14 ENCOUNTER — APPOINTMENT (OUTPATIENT)
Dept: LAB | Facility: HOSPITAL | Age: 75
End: 2025-08-14
Payer: MEDICARE

## 2025-08-15 ENCOUNTER — HOSPITAL ENCOUNTER (OUTPATIENT)
Dept: INFUSION CENTER | Facility: HOSPITAL | Age: 75
End: 2025-08-15
Payer: MEDICARE

## 2025-08-18 ENCOUNTER — APPOINTMENT (OUTPATIENT)
Dept: LAB | Facility: HOSPITAL | Age: 75
End: 2025-08-18
Payer: MEDICARE

## 2025-08-18 DIAGNOSIS — D69.6 THROMBOCYTOPENIA (HCC): ICD-10-CM

## 2025-08-18 DIAGNOSIS — D46.Z MDS (MYELODYSPLASTIC SYNDROME), HIGH GRADE (HCC): ICD-10-CM

## 2025-08-18 LAB
ALBUMIN SERPL BCG-MCNC: 4.1 G/DL (ref 3.5–5)
ALP SERPL-CCNC: 66 U/L (ref 34–104)
ALT SERPL W P-5'-P-CCNC: 13 U/L (ref 7–52)
ANION GAP SERPL CALCULATED.3IONS-SCNC: 6 MMOL/L (ref 4–13)
ANISOCYTOSIS BLD QL SMEAR: PRESENT
AST SERPL W P-5'-P-CCNC: 11 U/L (ref 13–39)
BASO STIPL BLD QL SMEAR: PRESENT
BASOPHILS # BLD AUTO: 0 THOUSANDS/ÂΜL (ref 0–0.1)
BASOPHILS NFR BLD AUTO: 0 % (ref 0–1)
BILIRUB SERPL-MCNC: 2.24 MG/DL (ref 0.2–1)
BUN SERPL-MCNC: 15 MG/DL (ref 5–25)
CALCIUM SERPL-MCNC: 9.5 MG/DL (ref 8.4–10.2)
CHLORIDE SERPL-SCNC: 105 MMOL/L (ref 96–108)
CO2 SERPL-SCNC: 28 MMOL/L (ref 21–32)
CREAT SERPL-MCNC: 0.54 MG/DL (ref 0.6–1.3)
EOSINOPHIL # BLD AUTO: 0 THOUSAND/ÂΜL (ref 0–0.61)
EOSINOPHIL NFR BLD AUTO: 0 % (ref 0–6)
ERYTHROCYTE [DISTWIDTH] IN BLOOD BY AUTOMATED COUNT: 18.9 % (ref 11.6–15.1)
GFR SERPL CREATININE-BSD FRML MDRD: 103 ML/MIN/1.73SQ M
GLUCOSE P FAST SERPL-MCNC: 186 MG/DL (ref 65–99)
HCT VFR BLD AUTO: 38.1 % (ref 36.5–49.3)
HGB BLD-MCNC: 11.7 G/DL (ref 12–17)
IMM GRANULOCYTES # BLD AUTO: 0.01 THOUSAND/UL (ref 0–0.2)
IMM GRANULOCYTES NFR BLD AUTO: 1 % (ref 0–2)
LYMPHOCYTES # BLD AUTO: 0.63 THOUSANDS/ÂΜL (ref 0.6–4.47)
LYMPHOCYTES NFR BLD AUTO: 42 % (ref 14–44)
MACROCYTES BLD QL AUTO: PRESENT
MCH RBC QN AUTO: 33.4 PG (ref 26.8–34.3)
MCHC RBC AUTO-ENTMCNC: 30.7 G/DL (ref 31.4–37.4)
MCV RBC AUTO: 109 FL (ref 82–98)
MONOCYTES # BLD AUTO: 0.05 THOUSAND/ÂΜL (ref 0.17–1.22)
MONOCYTES NFR BLD AUTO: 3 % (ref 4–12)
NEUTROPHILS # BLD AUTO: 0.81 THOUSANDS/ÂΜL (ref 1.85–7.62)
NEUTS SEG NFR BLD AUTO: 54 % (ref 43–75)
NRBC BLD AUTO-RTO: 0 /100 WBCS
PLATELET # BLD AUTO: 40 THOUSANDS/UL (ref 149–390)
PLATELET BLD QL SMEAR: ABNORMAL
PMV BLD AUTO: 12 FL (ref 8.9–12.7)
POLYCHROMASIA BLD QL SMEAR: PRESENT
POTASSIUM SERPL-SCNC: 4 MMOL/L (ref 3.5–5.3)
PROT SERPL-MCNC: 5.9 G/DL (ref 6.4–8.4)
RBC # BLD AUTO: 3.5 MILLION/UL (ref 3.88–5.62)
RBC MORPH BLD: PRESENT
SODIUM SERPL-SCNC: 139 MMOL/L (ref 135–147)
WBC # BLD AUTO: 1.5 THOUSAND/UL (ref 4.31–10.16)

## 2025-08-18 PROCEDURE — 80053 COMPREHEN METABOLIC PANEL: CPT

## 2025-08-18 PROCEDURE — 85025 COMPLETE CBC W/AUTO DIFF WBC: CPT

## 2025-08-18 PROCEDURE — 36415 COLL VENOUS BLD VENIPUNCTURE: CPT
